# Patient Record
Sex: MALE | Race: WHITE | NOT HISPANIC OR LATINO | Employment: OTHER | URBAN - METROPOLITAN AREA
[De-identification: names, ages, dates, MRNs, and addresses within clinical notes are randomized per-mention and may not be internally consistent; named-entity substitution may affect disease eponyms.]

---

## 2018-05-18 ENCOUNTER — HOSPITAL ENCOUNTER (INPATIENT)
Facility: HOSPITAL | Age: 78
LOS: 8 days | DRG: 234 | End: 2018-05-26
Attending: INTERNAL MEDICINE | Admitting: INTERNAL MEDICINE
Payer: MEDICARE

## 2018-05-18 DIAGNOSIS — R07.9 CHEST PAIN: ICD-10-CM

## 2018-05-18 DIAGNOSIS — I70.90 ARTERIAL VASCULAR DISEASE: Primary | ICD-10-CM

## 2018-05-18 DIAGNOSIS — I21.4 NSTEMI (NON-ST ELEVATED MYOCARDIAL INFARCTION) (CMS/HCC): ICD-10-CM

## 2018-05-18 DIAGNOSIS — I65.23 BILATERAL CAROTID ARTERY STENOSIS: ICD-10-CM

## 2018-05-18 PROBLEM — E78.5 HYPERLIPIDEMIA: Status: ACTIVE | Noted: 2018-05-18

## 2018-05-18 PROBLEM — K46.0 INCARCERATED HERNIA: Status: ACTIVE | Noted: 2018-05-18

## 2018-05-18 PROBLEM — E03.9 HYPOTHYROIDISM: Status: ACTIVE | Noted: 2018-05-18

## 2018-05-18 PROBLEM — E10.9 TYPE 1 DIABETES MELLITUS (CMS/HCC): Status: ACTIVE | Noted: 2018-05-18

## 2018-05-18 PROBLEM — N20.0 NEPHROLITHIASIS: Status: RESOLVED | Noted: 2018-05-18 | Resolved: 2018-05-18

## 2018-05-18 PROBLEM — N20.0 NEPHROLITHIASIS: Status: ACTIVE | Noted: 2018-05-18

## 2018-05-18 LAB
ALBUMIN SERPL-MCNC: 2.8 G/DL (ref 3.4–5)
ALBUMIN SERPL-MCNC: 2.9 G/DL (ref 3.4–5)
ALP SERPL-CCNC: 62 IU/L (ref 35–126)
ALP SERPL-CCNC: 64 IU/L (ref 35–126)
ALT SERPL-CCNC: 53 IU/L (ref 16–63)
ALT SERPL-CCNC: 53 IU/L (ref 16–63)
ANION GAP SERPL CALC-SCNC: 10 MEQ/L (ref 3–15)
ANION GAP SERPL CALC-SCNC: 10 MEQ/L (ref 3–15)
APTT PPP: 35 SEC. (ref 23–35)
APTT PPP: 38 SEC. (ref 23–35)
AST SERPL-CCNC: 155 IU/L (ref 15–41)
AST SERPL-CCNC: 156 IU/L (ref 15–41)
BASOPHILS # BLD: 0.02 K/UL (ref 0.01–0.1)
BASOPHILS NFR BLD: 0.2 %
BILIRUB SERPL-MCNC: 0.8 MG/DL (ref 0.3–1.2)
BILIRUB SERPL-MCNC: 0.8 MG/DL (ref 0.3–1.2)
BUN SERPL-MCNC: 13 MG/DL (ref 8–20)
BUN SERPL-MCNC: 14 MG/DL (ref 8–20)
CALCIUM SERPL-MCNC: 8.9 MG/DL (ref 8.9–10.3)
CALCIUM SERPL-MCNC: 9 MG/DL (ref 8.9–10.3)
CHLORIDE SERPL-SCNC: 102 MMOL/L (ref 98–109)
CHLORIDE SERPL-SCNC: 103 MMOL/L (ref 98–109)
CO2 SERPL-SCNC: 23 MMOL/L (ref 22–32)
CO2 SERPL-SCNC: 24 MMOL/L (ref 22–32)
CREAT SERPL-MCNC: 1.1 MG/DL (ref 0.8–1.3)
CREAT SERPL-MCNC: 1.2 MG/DL (ref 0.8–1.3)
DIFFERENTIAL METHOD BLD: ABNORMAL
EOSINOPHIL # BLD: 0.03 K/UL (ref 0.04–0.54)
EOSINOPHIL NFR BLD: 0.3 %
ERYTHROCYTE [DISTWIDTH] IN BLOOD BY AUTOMATED COUNT: 16.5 % (ref 11.6–14.4)
ERYTHROCYTE [DISTWIDTH] IN BLOOD BY AUTOMATED COUNT: 16.6 % (ref 11.6–14.4)
GFR SERPL CREATININE-BSD FRML MDRD: 58.4 ML/MIN/1.73M*2
GFR SERPL CREATININE-BSD FRML MDRD: >60 ML/MIN/1.73M*2
GLUCOSE BLD-MCNC: 151 MG/DL (ref 70–99)
GLUCOSE BLOOD, POC: 151
GLUCOSE SERPL-MCNC: 156 MG/DL (ref 70–99)
GLUCOSE SERPL-MCNC: 164 MG/DL (ref 70–99)
HCT VFR BLDCO AUTO: 32.2 % (ref 40–51)
HCT VFR BLDCO AUTO: 34.3 % (ref 40–51)
HGB BLD-MCNC: 10.1 G/DL (ref 13.7–17.5)
HGB BLD-MCNC: 10.6 G/DL (ref 13.7–17.5)
IMM GRANULOCYTES # BLD AUTO: 0.03 K/UL (ref 0–0.08)
IMM GRANULOCYTES NFR BLD AUTO: 0.3 %
INR PPP: 1 INR
INR PPP: 1.1 INR
LACTATE BLDA-SCNC: 1.6 MMOL/L (ref 0.4–1.6)
LACTATE SERPL-SCNC: 1.6 MMOL/L (ref 0.4–2)
LYMPHOCYTES # BLD: 1.17 K/UL (ref 1.2–3.5)
LYMPHOCYTES NFR BLD: 11.5 %
MAGNESIUM SERPL-MCNC: 1.5 MG/DL (ref 1.8–2.5)
MCH RBC QN AUTO: 24.2 PG (ref 28–33.2)
MCH RBC QN AUTO: 24.5 PG (ref 28–33.2)
MCHC RBC AUTO-ENTMCNC: 30.9 G/DL (ref 32.2–36.5)
MCHC RBC AUTO-ENTMCNC: 31.4 G/DL (ref 32.2–36.5)
MCV RBC AUTO: 78 FL (ref 83–98)
MCV RBC AUTO: 78.3 FL (ref 83–98)
MONOCYTES # BLD: 0.83 K/UL (ref 0.3–1)
MONOCYTES NFR BLD: 8.2 %
NEUTROPHILS # BLD: 8.1 K/UL (ref 1.7–7)
NEUTS SEG NFR BLD: 79.5 %
NRBC BLD-RTO: 0 %
PDW BLD AUTO: 9.4 FL (ref 9.4–12.4)
PDW BLD AUTO: 9.7 FL (ref 9.4–12.4)
PLATELET # BLD AUTO: 349 K/UL (ref 150–350)
PLATELET # BLD AUTO: 356 K/UL (ref 150–350)
POTASSIUM SERPL-SCNC: 3.8 MMOL/L (ref 3.6–5.1)
POTASSIUM SERPL-SCNC: 4.1 MMOL/L (ref 3.6–5.1)
PROT SERPL-MCNC: 5.9 G/DL (ref 6–8.2)
PROT SERPL-MCNC: 6.3 G/DL (ref 6–8.2)
PROTHROMBIN TIME: 13.5 SEC. (ref 12.2–14.5)
PROTHROMBIN TIME: 13.8 SEC. (ref 12.2–14.5)
RBC # BLD AUTO: 4.13 M/UL (ref 4.5–5.8)
RBC # BLD AUTO: 4.38 M/UL (ref 4.5–5.8)
SODIUM SERPL-SCNC: 135 MMOL/L (ref 136–144)
SODIUM SERPL-SCNC: 137 MMOL/L (ref 136–144)
TROPONIN I SERPL-MCNC: 37.63 NG/ML
WBC # BLD AUTO: 10.18 K/UL (ref 3.8–10.5)
WBC # BLD AUTO: 10.36 K/UL (ref 3.8–10.5)

## 2018-05-18 PROCEDURE — 85610 PROTHROMBIN TIME: CPT | Performed by: STUDENT IN AN ORGANIZED HEALTH CARE EDUCATION/TRAINING PROGRAM

## 2018-05-18 PROCEDURE — 83735 ASSAY OF MAGNESIUM: CPT | Performed by: STUDENT IN AN ORGANIZED HEALTH CARE EDUCATION/TRAINING PROGRAM

## 2018-05-18 PROCEDURE — 83605 ASSAY OF LACTIC ACID: CPT | Performed by: INTERNAL MEDICINE

## 2018-05-18 PROCEDURE — 93005 ELECTROCARDIOGRAM TRACING: CPT | Performed by: STUDENT IN AN ORGANIZED HEALTH CARE EDUCATION/TRAINING PROGRAM

## 2018-05-18 PROCEDURE — 85027 COMPLETE CBC AUTOMATED: CPT | Performed by: STUDENT IN AN ORGANIZED HEALTH CARE EDUCATION/TRAINING PROGRAM

## 2018-05-18 PROCEDURE — 63600000 HC DRUGS/DETAIL CODE: Performed by: STUDENT IN AN ORGANIZED HEALTH CARE EDUCATION/TRAINING PROGRAM

## 2018-05-18 PROCEDURE — 86922 COMPATIBILITY TEST ANTIGLOB: CPT | Mod: 59

## 2018-05-18 PROCEDURE — 93454 CORONARY ARTERY ANGIO S&I: CPT | Mod: 26 | Performed by: INTERNAL MEDICINE

## 2018-05-18 PROCEDURE — 99223 1ST HOSP IP/OBS HIGH 75: CPT | Mod: 25 | Performed by: INTERNAL MEDICINE

## 2018-05-18 PROCEDURE — B211YZZ FLUOROSCOPY OF MULTIPLE CORONARY ARTERIES USING OTHER CONTRAST: ICD-10-PCS | Performed by: INTERNAL MEDICINE

## 2018-05-18 PROCEDURE — 86850 RBC ANTIBODY SCREEN: CPT

## 2018-05-18 PROCEDURE — 85610 PROTHROMBIN TIME: CPT | Performed by: INTERNAL MEDICINE

## 2018-05-18 PROCEDURE — 63600000 HC DRUGS/DETAIL CODE: Performed by: INTERNAL MEDICINE

## 2018-05-18 PROCEDURE — 86901 BLOOD TYPING SEROLOGIC RH(D): CPT

## 2018-05-18 PROCEDURE — 27200000 HC STERILE SUPPLY: Performed by: INTERNAL MEDICINE

## 2018-05-18 PROCEDURE — 84484 ASSAY OF TROPONIN QUANT: CPT | Performed by: STUDENT IN AN ORGANIZED HEALTH CARE EDUCATION/TRAINING PROGRAM

## 2018-05-18 PROCEDURE — 83605 ASSAY OF LACTIC ACID: CPT | Performed by: STUDENT IN AN ORGANIZED HEALTH CARE EDUCATION/TRAINING PROGRAM

## 2018-05-18 PROCEDURE — 85025 COMPLETE CBC W/AUTO DIFF WBC: CPT | Performed by: INTERNAL MEDICINE

## 2018-05-18 PROCEDURE — 4A023N7 MEASUREMENT OF CARDIAC SAMPLING AND PRESSURE, LEFT HEART, PERCUTANEOUS APPROACH: ICD-10-PCS | Performed by: INTERNAL MEDICINE

## 2018-05-18 PROCEDURE — 99153 MOD SED SAME PHYS/QHP EA: CPT | Performed by: INTERNAL MEDICINE

## 2018-05-18 PROCEDURE — 99222 1ST HOSP IP/OBS MODERATE 55: CPT | Performed by: THORACIC SURGERY (CARDIOTHORACIC VASCULAR SURGERY)

## 2018-05-18 PROCEDURE — 80053 COMPREHEN METABOLIC PANEL: CPT | Performed by: STUDENT IN AN ORGANIZED HEALTH CARE EDUCATION/TRAINING PROGRAM

## 2018-05-18 PROCEDURE — 99152 MOD SED SAME PHYS/QHP 5/>YRS: CPT | Performed by: INTERNAL MEDICINE

## 2018-05-18 PROCEDURE — 85730 THROMBOPLASTIN TIME PARTIAL: CPT | Performed by: INTERNAL MEDICINE

## 2018-05-18 PROCEDURE — C1769 GUIDE WIRE: HCPCS | Performed by: INTERNAL MEDICINE

## 2018-05-18 PROCEDURE — 63600105 HC IODINE BASED CONTRAST: Performed by: INTERNAL MEDICINE

## 2018-05-18 PROCEDURE — 99222 1ST HOSP IP/OBS MODERATE 55: CPT | Performed by: SURGERY

## 2018-05-18 PROCEDURE — 36415 COLL VENOUS BLD VENIPUNCTURE: CPT | Performed by: INTERNAL MEDICINE

## 2018-05-18 PROCEDURE — 63700000 HC SELF-ADMINISTRABLE DRUG: Performed by: INTERNAL MEDICINE

## 2018-05-18 PROCEDURE — C1894 INTRO/SHEATH, NON-LASER: HCPCS | Performed by: INTERNAL MEDICINE

## 2018-05-18 PROCEDURE — 93454 CORONARY ARTERY ANGIO S&I: CPT | Performed by: INTERNAL MEDICINE

## 2018-05-18 PROCEDURE — 25000000 HC PHARMACY GENERAL: Performed by: INTERNAL MEDICINE

## 2018-05-18 PROCEDURE — 80053 COMPREHEN METABOLIC PANEL: CPT | Performed by: INTERNAL MEDICINE

## 2018-05-18 PROCEDURE — 20000000 HC ROOM AND CARE ICU

## 2018-05-18 PROCEDURE — 85730 THROMBOPLASTIN TIME PARTIAL: CPT | Performed by: STUDENT IN AN ORGANIZED HEALTH CARE EDUCATION/TRAINING PROGRAM

## 2018-05-18 RX ORDER — IODIXANOL 320 MG/ML
INJECTION, SOLUTION INTRAVASCULAR AS NEEDED
Status: DISCONTINUED | OUTPATIENT
Start: 2018-05-18 | End: 2018-05-18 | Stop reason: HOSPADM

## 2018-05-18 RX ORDER — DEXTROSE 40 %
15-30 GEL (GRAM) ORAL AS NEEDED
Status: DISCONTINUED | OUTPATIENT
Start: 2018-05-18 | End: 2018-05-20

## 2018-05-18 RX ORDER — NICARDIPINE HCL-0.9% SOD CHLOR 1 MG/10 ML
SYRINGE (ML) INTRAVENOUS AS NEEDED
Status: DISCONTINUED | OUTPATIENT
Start: 2018-05-18 | End: 2018-05-18 | Stop reason: HOSPADM

## 2018-05-18 RX ORDER — LEVOTHYROXINE SODIUM 200 UG/1
200 TABLET ORAL
Status: DISCONTINUED | OUTPATIENT
Start: 2018-05-19 | End: 2018-05-21

## 2018-05-18 RX ORDER — MAGNESIUM SULFATE HEPTAHYDRATE 40 MG/ML
4 INJECTION, SOLUTION INTRAVENOUS ONCE
Status: COMPLETED | OUTPATIENT
Start: 2018-05-19 | End: 2018-05-19

## 2018-05-18 RX ORDER — FENTANYL CITRATE 50 UG/ML
INJECTION, SOLUTION INTRAMUSCULAR; INTRAVENOUS
Status: DISCONTINUED
Start: 2018-05-18 | End: 2018-05-18 | Stop reason: WASHOUT

## 2018-05-18 RX ORDER — IBUPROFEN 200 MG
16-32 TABLET ORAL AS NEEDED
Status: DISCONTINUED | OUTPATIENT
Start: 2018-05-18 | End: 2018-05-20

## 2018-05-18 RX ORDER — HEPARIN SODIUM 10000 [USP'U]/100ML
0-1900 INJECTION, SOLUTION INTRAVENOUS
Status: DISCONTINUED | OUTPATIENT
Start: 2018-05-18 | End: 2018-05-20

## 2018-05-18 RX ORDER — HEPARIN SODIUM 1000 [USP'U]/ML
INJECTION, SOLUTION INTRAVENOUS; SUBCUTANEOUS
Status: DISCONTINUED
Start: 2018-05-18 | End: 2018-05-26 | Stop reason: HOSPADM

## 2018-05-18 RX ORDER — LIDOCAINE HYDROCHLORIDE 10 MG/ML
INJECTION, SOLUTION EPIDURAL; INFILTRATION; INTRACAUDAL; PERINEURAL
Status: DISCONTINUED
Start: 2018-05-18 | End: 2018-05-26 | Stop reason: HOSPADM

## 2018-05-18 RX ORDER — ASPIRIN 325 MG
325 TABLET ORAL ONCE
Status: COMPLETED | OUTPATIENT
Start: 2018-05-18 | End: 2018-05-18

## 2018-05-18 RX ORDER — HEPARIN SODIUM 1000 [USP'U]/ML
INJECTION, SOLUTION INTRAVENOUS; SUBCUTANEOUS AS NEEDED
Status: DISCONTINUED | OUTPATIENT
Start: 2018-05-18 | End: 2018-05-18 | Stop reason: HOSPADM

## 2018-05-18 RX ORDER — DEXTROSE 50 % IN WATER (D50W) INTRAVENOUS SYRINGE
25 AS NEEDED
Status: DISCONTINUED | OUTPATIENT
Start: 2018-05-18 | End: 2018-05-20

## 2018-05-18 RX ORDER — ASPIRIN 81 MG/1
81 TABLET ORAL DAILY
Status: DISCONTINUED | OUTPATIENT
Start: 2018-05-19 | End: 2018-05-26 | Stop reason: HOSPADM

## 2018-05-18 RX ORDER — PANTOPRAZOLE SODIUM 40 MG/1
40 TABLET, DELAYED RELEASE ORAL NIGHTLY
Status: COMPLETED | OUTPATIENT
Start: 2018-05-18 | End: 2018-05-19

## 2018-05-18 RX ORDER — ASPIRIN 325 MG
TABLET ORAL
Status: DISCONTINUED
Start: 2018-05-18 | End: 2018-05-26 | Stop reason: HOSPADM

## 2018-05-18 RX ORDER — CHLORHEXIDINE GLUCONATE ORAL RINSE 1.2 MG/ML
15 SOLUTION DENTAL ONCE
Status: CANCELLED | OUTPATIENT
Start: 2018-05-18 | End: 2018-05-18

## 2018-05-18 RX ORDER — MIDAZOLAM HYDROCHLORIDE 2 MG/2ML
INJECTION, SOLUTION INTRAMUSCULAR; INTRAVENOUS
Status: DISCONTINUED
Start: 2018-05-18 | End: 2018-05-18 | Stop reason: WASHOUT

## 2018-05-18 RX ORDER — NITROGLYCERIN 40 MG/100ML
INJECTION INTRAVENOUS CONTINUOUS PRN
Status: DISCONTINUED | OUTPATIENT
Start: 2018-05-18 | End: 2018-05-18 | Stop reason: HOSPADM

## 2018-05-18 RX ADMIN — HEPARIN SODIUM 1000 UNITS/HR: 10000 INJECTION, SOLUTION INTRAVENOUS at 20:24

## 2018-05-18 RX ADMIN — ASPIRIN 325 MG: 325 TABLET ORAL at 17:39

## 2018-05-18 NOTE — Clinical Note
The left groin and left radial was clipped, marked  and prepped with ChloraPrep. The patient was draped in a sterile fashion after allowing for the recommended dry time.

## 2018-05-18 NOTE — Clinical Note
Closure device used: radial compression system. Closure pressure manually applied. Hemostasis achieved.

## 2018-05-18 NOTE — PRE-PROCEDURE NOTE
Cardiac Cath Lab Pre-procedure Note    - Patient was seen and examined at bedside.  - The patient's chart and all data was reviewed.  - The procedure, treatment alternatives, risks and benefits were explained with specific risks discussed.  - Patient was consented for cardiac cath procedure and possible PCI.  - Patient's case was found appropriate for dual antiplatelet therapy.    Patient's clinical presentation to the cardiac cath lab: NSTEMI.     Patient is at risk for bleeding and renal failure potentially requiring dialysis due to the presence of pre-procedural abnormal renal function.   Patient appears to be moderately frail.         Total anti-anginal medications: 1.

## 2018-05-18 NOTE — Clinical Note
Universal procedure checklist completed. Airway assessment completed. Physician agrees with Mallampati.

## 2018-05-18 NOTE — CONSULTS
"General Surgery Consult    Subjective     Maximus Daniel is a 77 y.o. male who was admitted for Chest pain [R07.9]. Patient was seen in consultation at the request of referring physician for management recommendations. Patient is ***    Medical History: No past medical history on file.    Surgical History: No past surgical history on file.    Social History:   Social History     Social History Narrative   • No narrative on file       Family History: No family history on file.    Allergies: Patient has no known allergies.    Home Medications:      Current Medications:  •  aspirin, , ,   •  [START ON 5/19/2018] aspirin, 81 mg, oral, Daily  •  glucose, 16-32 g of dextrose, oral, PRN **OR** dextrose, 15-30 g of dextrose, oral, PRN **OR** glucagon, 1 mg, intramuscular, PRN **OR** dextrose in water, 25 mL, intravenous, PRN  •  heparin (porcine), , ,   •  heparin (porcine), 15-30 Units/kg (Adjusted), intravenous, q6h PRN  •  heparin (porcine), 4,000 Units, intravenous, Once  •  heparin infusion - MAR calculator by PTT, 0-1,900 Units/hr, intravenous, Titrated  •  lidocaine PF, , ,     Review of Systems  {Ros - Complete:37714}    Objective     Physicial Exam  BP (!) 106/59   Pulse 72   Ht 1.6 m (5' 3\")   Wt 89.8 kg (198 lb)   SpO2 97%   BMI 35.07 kg/m²     General appearance: {general exam:09118::\"alert\",\"appears stated age\",\"cooperative\"}  Head: {head exam:72735::\"normocephalic, without obvious abnormality\",\"atraumatic\"}  Eyes: {eye exam:201::\"conjunctivae/corneas clear. PERRL, EOM's intact. Fundi benign.\"}  Ears: {ear exam:4489::\"normal TM's and external ear canals both ears\"}  Nose: {nose exam:30698::\"Nares normal. Septum midline. Mucosa normal. No drainage or sinus tenderness.\"}  Throat: {throat exam:17968::\"lips, mucosa, and tongue normal; teeth and gums normal\"}  Neck: {neck exam:47066::\"no adenopathy\",\"no carotid bruit\",\"no JVD\",\"supple, symmetrical, trachea midline\",\"thyroid not enlarged, symmetric, no " "tenderness/mass/nodules\"}  Back: {back exam:801::\"symmetric, no curvature. ROM normal. No CVA tenderness.\"}  Lungs: {lung exam:61095::\"clear to auscultation bilaterally\"}  Chest wall: {chest exam:48861::\"no tenderness\"}  Heart: {heart exam:71336::\"regular rate and rhythm, S1, S2 normal, no murmur, click, rub or gallop\"}  Abdomen: {abdominal exam:31442::\"soft, non-tender; bowel sounds normal; no masses, no organomegaly\"}  Genitalia: {Genitalia:99469}  Rectal: {rectal exam:83030::\"normal tone, no masses or tenderness\"}  Extremities: {extremity exam:5109::\"extremities normal, warm and well-perfused; no cyanosis, clubbing, or edema\"}  Pulses: {pulse exam:50832::\"2+ and symmetric\"}  Skin: {skin exam:67162::\"Skin color, texture, turgor normal. No rashes or lesions\"}  Lymph nodes: {lymph node exam:12687::\"Cervical, supraclavicular, and axillary nodes normal.\"}  Neurologic: {neuro exam:80623::\"Grossly normal\"}  ***    Labs  {LABS REVIEWED SCT:58410}    Imaging  {Imaging reviewed last 24H:52792}    Assessment     ***        Plan     ***    Bell Witt MD  "

## 2018-05-18 NOTE — NURSING NOTE
Patient arrived from [facility] via ambulance. Family at bedside. Dr Rg and Dr Dent at bedside upon arrival. EKG performed. Heparin gtt d/c'd at 1700, NTG gtt continue at 33mcg/min through RFA.

## 2018-05-19 ENCOUNTER — APPOINTMENT (INPATIENT)
Dept: CARDIOLOGY | Facility: HOSPITAL | Age: 78
DRG: 234 | End: 2018-05-19
Attending: INTERNAL MEDICINE
Payer: MEDICARE

## 2018-05-19 ENCOUNTER — APPOINTMENT (INPATIENT)
Dept: RADIOLOGY | Facility: HOSPITAL | Age: 78
DRG: 234 | End: 2018-05-19
Attending: STUDENT IN AN ORGANIZED HEALTH CARE EDUCATION/TRAINING PROGRAM
Payer: MEDICARE

## 2018-05-19 ENCOUNTER — ANESTHESIA EVENT (INPATIENT)
Dept: OPERATING ROOM | Facility: HOSPITAL | Age: 78
DRG: 234 | End: 2018-05-19
Payer: MEDICARE

## 2018-05-19 ENCOUNTER — APPOINTMENT (INPATIENT)
Dept: RADIOLOGY | Facility: HOSPITAL | Age: 78
DRG: 234 | End: 2018-05-19
Attending: NURSE PRACTITIONER
Payer: MEDICARE

## 2018-05-19 PROBLEM — E11.9 TYPE 2 DIABETES MELLITUS (CMS/HCC): Status: ACTIVE | Noted: 2018-05-18

## 2018-05-19 PROBLEM — I21.3 STEMI (ST ELEVATION MYOCARDIAL INFARCTION) (CMS/HCC): Status: ACTIVE | Noted: 2018-05-19

## 2018-05-19 PROBLEM — I21.4 NSTEMI (NON-ST ELEVATED MYOCARDIAL INFARCTION) (CMS/HCC): Status: ACTIVE | Noted: 2018-05-18

## 2018-05-19 PROBLEM — R07.9 CHEST PAIN: Status: ACTIVE | Noted: 2018-05-18

## 2018-05-19 PROBLEM — R07.9 CHEST PAIN: Status: RESOLVED | Noted: 2018-05-18 | Resolved: 2018-05-19

## 2018-05-19 LAB
ABO + RH BLD: NORMAL
ABO + RH BLD: NORMAL
ANION GAP SERPL CALC-SCNC: 7 MEQ/L (ref 3–15)
AORTIC ROOT ANNULUS: 3.5 CM
AORTIC VALVE MEAN VELOCITY: 1.47 M/S
AORTIC VALVE VELOCITY TIME INTEGRAL: 40.6 CM
APTT PPP: 44 SEC. (ref 23–35)
APTT PPP: 47 SEC. (ref 23–35)
APTT PPP: 52 SEC. (ref 23–35)
ASCENDING AORTA: 3.8 CM
AV MEAN GRADIENT: 10 MMHG
AV PEAK GRADIENT: 20 MMHG
AV PEAK VELOCITY-S: 2.23 M/S
AV VALVE AREA: 2.23 CM2
BASE EXCESS BLDA CALC-SCNC: -1.9 MMOL/L
BILIRUB UR QL STRIP.AUTO: NEGATIVE MG/DL
BLD GP AB INVEST PLASRBC-IMP: NORMAL
BLD GP AB SCN SERPL QL: POSITIVE
BSA FOR ECHO PROCEDURE: 2 M2
BUN SERPL-MCNC: 15 MG/DL (ref 8–20)
CALCIUM SERPL-MCNC: 8.4 MG/DL (ref 8.9–10.3)
CHLORIDE SERPL-SCNC: 105 MMOL/L (ref 98–109)
CHOLEST SERPL-MCNC: 152 MG/DL
CLARITY UR REFRACT.AUTO: CLEAR
CO2 SERPL-SCNC: 25 MMOL/L (ref 22–32)
COLOR UR AUTO: YELLOW
CREAT SERPL-MCNC: 1.1 MG/DL (ref 0.8–1.3)
CROSSMATCH: NORMAL
CROSSMATCH: NORMAL
CUSP SEPARATION: 1.2 CM
D AG BLD QL: NEGATIVE
D AG BLD QL: NEGATIVE
DOP CALC LVOT STROKE VOLUME: 109.87 ML
E WAVE DECELERATION TIME: 190 MS
E/A RATIO: 1.1
E/E' RATIO: 17.2
E/LAT E' RATIO: 13.9
EDV (BP): 95.4 ML
EF (A4C): 69.5 %
EF A2C: 73.5 %
EJECTION FRACTION: 71.7 %
ERYTHROCYTE [DISTWIDTH] IN BLOOD BY AUTOMATED COUNT: 16.4 % (ref 11.6–14.4)
EST. AVERAGE GLUCOSE BLD GHB EST-MCNC: 255 MG/DL
ESV (BP): 27 CM3
FIO2 ON VENT: 21 %
GFR SERPL CREATININE-BSD FRML MDRD: >60 ML/MIN/1.73M*2
GLUCOSE BLD-MCNC: 141 MG/DL (ref 70–99)
GLUCOSE BLD-MCNC: 160 MG/DL (ref 70–99)
GLUCOSE BLD-MCNC: 174 MG/DL (ref 70–99)
GLUCOSE BLD-MCNC: 263 MG/DL (ref 70–99)
GLUCOSE SERPL-MCNC: 145 MG/DL (ref 70–99)
GLUCOSE UR STRIP.AUTO-MCNC: >=1000 MG/DL
HBA1C MFR BLD HPLC: 10.5 %
HCO3 BLDA-SCNC: 21.7 MMOL/L (ref 21–28)
HCT VFR BLDCO AUTO: 29.4 % (ref 40–51)
HDLC SERPL-MCNC: 29 MG/DL
HDLC SERPL: 5.2 {RATIO}
HGB BLD-MCNC: 9 G/DL (ref 13.7–17.5)
HGB UR QL STRIP.AUTO: NEGATIVE
INHALED O2 CONCENTRATION: ABNORMAL %
INR PPP: 1.1 INR
INTERVENTRICULAR SEPTUM: 0.93 CM
ISBT CODE: 600
ISBT CODE: 600
KETONES UR STRIP.AUTO-MCNC: ABNORMAL MG/DL
LA ESV (BP): 66.9 CM3
LA ESV INDEX (A2C): 32.15 CM3/M2
LA ESV INDEX (BP): 33.45 CM3/M2
LA/AORTA RATIO: 1.37
LAAS-AP2: 21.5 CM2
LAAS-AP4: 19.7 CM2
LABORATORY COMMENT REPORT: NORMAL
LABORATORY COMMENT REPORT: NORMAL
LAD 2D: 4.8 CM
LALD A4C: 5.17 CM
LALD A4C: 5.81 CM
LAV-S: 64.3 CM3
LDLC SERPL CALC-MCNC: 103 MG/DL
LEFT ATRIUM VOLUME INDEX: 33.5 CM3/M2
LEFT ATRIUM VOLUME: 67 CM3
LEFT INTERNAL DIMENSION IN SYSTOLE: 2.7 CM (ref 2.72–4.12)
LEFT VENTRICLE DIASTOLIC VOLUME INDEX: 50.5 ML/M2
LEFT VENTRICLE DIASTOLIC VOLUME: 101 ML
LEFT VENTRICLE SYSTOLIC VOLUME INDEX: 15.35 CM3/M2
LEFT VENTRICLE SYSTOLIC VOLUME: 30.7 CM3
LEFT VENTRICULAR INTERNAL DIMENSION IN DIASTOLE: 4.11 CM (ref 4.62–6.42)
LEFT VENTRICULAR POSTERIOR WALL IN END DIASTOLE: 1 CM (ref 0.6–1.12)
LEUKOCYTE ESTERASE UR QL STRIP.AUTO: NEGATIVE
LV DIASTOLIC VOLUME: 88.3 ML
LV ESV (APICAL 2 CHAMBER): 23.4 CM3
LVAD-AP2: 29.1 CM2
LVAD-AP4: 31 CM2
LVAS-AP2: 12.8 CM2
LVEDVI(A2C): 44.15 ML/M2
LVEDVI(BP): 47.7 ML/M2
LVESVI(A2C): 11.7 CM3/M2
LVESVI(BP): 13.5 CM3/M2
LVLD-AP2: 7.99 CM
LVLD-AP4: 7.81 CM
LVLS-AP2: 6.17 CM
LVLS-AP4: 6.31 CM
LVOT 2D: 2.4 CM
LVOT A: 4.52 CM2
LVOT MG: 3 MMHG
LVOT MV: 0.81 M/S
LVOT PEAK VELOCITY: 1.1 M/S
LVOT PG: 5 MMHG
LVOT VTI: 24.3 CM
MAGNESIUM SERPL-MCNC: 2.3 MG/DL (ref 1.8–2.5)
MAGNESIUM SERPL-MCNC: 2.8 MG/DL (ref 1.8–2.5)
MCH RBC QN AUTO: 24.6 PG (ref 28–33.2)
MCHC RBC AUTO-ENTMCNC: 30.6 G/DL (ref 32.2–36.5)
MCV RBC AUTO: 80.3 FL (ref 83–98)
MRSA DNA SPEC QL NAA+PROBE: NEGATIVE
MV E'TISSUE VEL-LAT: 0.08 M/S
MV E'TISSUE VEL-MED: 0.07 M/S
MV MEAN GRADIENT: 3 MMHG
MV PEAK A VEL: 1.08 M/S
MV PEAK E VEL: 1.18 M/S
MV PEAK GRADIENT: 8 MMHG
MV VALVE AREA BY CONTINUITY EQUATION: 2.17 CM2
MV VTI: 50.7 CM
NITRITE UR QL STRIP.AUTO: NEGATIVE
NONHDLC SERPL-MCNC: 123 MG/DL
PCO2 BLDA: 32 MMHG (ref 35–48)
PDW BLD AUTO: 9.3 FL (ref 9.4–12.4)
PH BLDA: 7.44 [PH] (ref 7.35–7.45)
PH UR STRIP.AUTO: 5.5 [PH]
PLATELET # BLD AUTO: 329 K/UL (ref 150–350)
PO2 BLDA: 80 MMHG (ref 83–100)
POSTERIOR WALL: 1 CM
POTASSIUM SERPL-SCNC: 3.7 MMOL/L (ref 3.6–5.1)
PREALB SERPL-MCNC: 14.2 MG/DL (ref 18–38)
PRODUCT CODE: NORMAL
PRODUCT CODE: NORMAL
PRODUCT STATUS: NORMAL
PRODUCT STATUS: NORMAL
PROT UR QL STRIP.AUTO: NEGATIVE
PROTHROMBIN TIME: 14.3 SEC. (ref 12.2–14.5)
RBC # BLD AUTO: 3.66 M/UL (ref 4.5–5.8)
RVOT VMAX: 0.83 M/S
SODIUM SERPL-SCNC: 137 MMOL/L (ref 136–144)
SP GR UR REFRACT.AUTO: >1.035
SPECIMEN EXP DATE BLD: NORMAL
SPECIMEN EXP DATE BLD: NORMAL
T4 FREE SERPL-MCNC: 1.54 NG/DL (ref 0.58–1.64)
TRIGL SERPL-MCNC: 101 MG/DL (ref 30–149)
TROPONIN I SERPL-MCNC: 19.38 NG/ML
TROPONIN I SERPL-MCNC: 30.19 NG/ML
TROPONIN I SERPL-MCNC: 43.34 NG/ML
UNIT ABO: NORMAL
UNIT ABO: NORMAL
UNIT ID: NORMAL
UNIT ID: NORMAL
UNIT RH: NEGATIVE
UNIT RH: NEGATIVE
UROBILINOGEN UR STRIP-ACNC: 0.2 EU/DL
WBC # BLD AUTO: 9.19 K/UL (ref 3.8–10.5)
Z-SCORE OF LEFT VENTRICULAR DIMENSION IN END DIASTOLE: -2.81
Z-SCORE OF LEFT VENTRICULAR DIMENSION IN END SYSTOLE: -1.71
Z-SCORE OF LEFT VENTRICULAR POSTERIOR WALL IN END DIASTOLE: 1.02

## 2018-05-19 PROCEDURE — 63700000 HC SELF-ADMINISTRABLE DRUG: Performed by: PHYSICIAN ASSISTANT

## 2018-05-19 PROCEDURE — 99223 1ST HOSP IP/OBS HIGH 75: CPT | Mod: 25 | Performed by: INTERNAL MEDICINE

## 2018-05-19 PROCEDURE — 63700000 HC SELF-ADMINISTRABLE DRUG: Performed by: STUDENT IN AN ORGANIZED HEALTH CARE EDUCATION/TRAINING PROGRAM

## 2018-05-19 PROCEDURE — 63700000 HC SELF-ADMINISTRABLE DRUG: Performed by: NURSE PRACTITIONER

## 2018-05-19 PROCEDURE — 20000000 HC ROOM AND CARE ICU

## 2018-05-19 PROCEDURE — 93306 TTE W/DOPPLER COMPLETE: CPT

## 2018-05-19 PROCEDURE — 85610 PROTHROMBIN TIME: CPT | Performed by: PHYSICIAN ASSISTANT

## 2018-05-19 PROCEDURE — 81003 URINALYSIS AUTO W/O SCOPE: CPT | Performed by: PHYSICIAN ASSISTANT

## 2018-05-19 PROCEDURE — 71250 CT THORAX DX C-: CPT

## 2018-05-19 PROCEDURE — 85730 THROMBOPLASTIN TIME PARTIAL: CPT | Performed by: INTERNAL MEDICINE

## 2018-05-19 PROCEDURE — 83036 HEMOGLOBIN GLYCOSYLATED A1C: CPT | Performed by: PHYSICIAN ASSISTANT

## 2018-05-19 PROCEDURE — 93880 EXTRACRANIAL BILAT STUDY: CPT

## 2018-05-19 PROCEDURE — 80061 LIPID PANEL: CPT | Performed by: PHYSICIAN ASSISTANT

## 2018-05-19 PROCEDURE — 82803 BLOOD GASES ANY COMBINATION: CPT | Performed by: NURSE PRACTITIONER

## 2018-05-19 PROCEDURE — 84484 ASSAY OF TROPONIN QUANT: CPT | Mod: 91 | Performed by: STUDENT IN AN ORGANIZED HEALTH CARE EDUCATION/TRAINING PROGRAM

## 2018-05-19 PROCEDURE — 93306 TTE W/DOPPLER COMPLETE: CPT | Mod: 26 | Performed by: INTERNAL MEDICINE

## 2018-05-19 PROCEDURE — 99232 SBSQ HOSP IP/OBS MODERATE 35: CPT | Performed by: SURGERY

## 2018-05-19 PROCEDURE — 36415 COLL VENOUS BLD VENIPUNCTURE: CPT | Performed by: PHYSICIAN ASSISTANT

## 2018-05-19 PROCEDURE — 63600000 HC DRUGS/DETAIL CODE: Performed by: INTERNAL MEDICINE

## 2018-05-19 PROCEDURE — 84484 ASSAY OF TROPONIN QUANT: CPT | Performed by: PHYSICIAN ASSISTANT

## 2018-05-19 PROCEDURE — 85730 THROMBOPLASTIN TIME PARTIAL: CPT | Performed by: STUDENT IN AN ORGANIZED HEALTH CARE EDUCATION/TRAINING PROGRAM

## 2018-05-19 PROCEDURE — 84484 ASSAY OF TROPONIN QUANT: CPT | Mod: 91 | Performed by: INTERNAL MEDICINE

## 2018-05-19 PROCEDURE — 83735 ASSAY OF MAGNESIUM: CPT | Performed by: INTERNAL MEDICINE

## 2018-05-19 PROCEDURE — 71045 X-RAY EXAM CHEST 1 VIEW: CPT

## 2018-05-19 PROCEDURE — 85027 COMPLETE CBC AUTOMATED: CPT | Performed by: PHYSICIAN ASSISTANT

## 2018-05-19 PROCEDURE — 85730 THROMBOPLASTIN TIME PARTIAL: CPT | Performed by: NURSE PRACTITIONER

## 2018-05-19 PROCEDURE — 80048 BASIC METABOLIC PNL TOTAL CA: CPT | Performed by: PHYSICIAN ASSISTANT

## 2018-05-19 PROCEDURE — 84134 ASSAY OF PREALBUMIN: CPT | Performed by: PHYSICIAN ASSISTANT

## 2018-05-19 PROCEDURE — 63600000 HC DRUGS/DETAIL CODE: Performed by: STUDENT IN AN ORGANIZED HEALTH CARE EDUCATION/TRAINING PROGRAM

## 2018-05-19 PROCEDURE — 84439 ASSAY OF FREE THYROXINE: CPT | Performed by: PHYSICIAN ASSISTANT

## 2018-05-19 PROCEDURE — 25800000 HC PHARMACY IV SOLUTIONS: Performed by: INTERNAL MEDICINE

## 2018-05-19 PROCEDURE — 85730 THROMBOPLASTIN TIME PARTIAL: CPT | Performed by: PHYSICIAN ASSISTANT

## 2018-05-19 PROCEDURE — 63700000 HC SELF-ADMINISTRABLE DRUG: Performed by: INTERNAL MEDICINE

## 2018-05-19 PROCEDURE — 87641 MR-STAPH DNA AMP PROBE: CPT | Performed by: INTERNAL MEDICINE

## 2018-05-19 RX ORDER — CEFAZOLIN SODIUM/WATER 1 G/10 ML
2 SYRINGE (ML) INTRAVENOUS
Status: DISCONTINUED | OUTPATIENT
Start: 2018-05-19 | End: 2018-05-19

## 2018-05-19 RX ORDER — INSULIN ASPART 100 [IU]/ML
3-5 INJECTION, SOLUTION INTRAVENOUS; SUBCUTANEOUS
Status: DISCONTINUED | OUTPATIENT
Start: 2018-05-19 | End: 2018-05-20

## 2018-05-19 RX ORDER — CEFAZOLIN SODIUM/WATER 1 G/10 ML
2 SYRINGE (ML) INTRAVENOUS ONCE
Status: COMPLETED | OUTPATIENT
Start: 2018-05-20 | End: 2018-05-20

## 2018-05-19 RX ORDER — VANCOMYCIN HYDROCHLORIDE
1.25 ONCE
Status: DISCONTINUED | OUTPATIENT
Start: 2018-05-20 | End: 2018-05-20

## 2018-05-19 RX ORDER — PANTOPRAZOLE SODIUM 40 MG/1
40 TABLET, DELAYED RELEASE ORAL NIGHTLY
Status: COMPLETED | OUTPATIENT
Start: 2018-05-19 | End: 2018-05-19

## 2018-05-19 RX ORDER — MUPIROCIN 20 MG/G
1 OINTMENT TOPICAL 2 TIMES DAILY
Status: DISCONTINUED | OUTPATIENT
Start: 2018-05-19 | End: 2018-05-20

## 2018-05-19 RX ORDER — CHLORHEXIDINE GLUCONATE ORAL RINSE 1.2 MG/ML
15 SOLUTION DENTAL ONCE
Status: DISCONTINUED | OUTPATIENT
Start: 2018-05-19 | End: 2018-05-20

## 2018-05-19 RX ORDER — INSULIN GLARGINE 100 [IU]/ML
10 INJECTION, SOLUTION SUBCUTANEOUS NIGHTLY
Status: DISCONTINUED | OUTPATIENT
Start: 2018-05-19 | End: 2018-05-20

## 2018-05-19 RX ORDER — CEFAZOLIN SODIUM/WATER 1 G/10 ML
2 SYRINGE (ML) INTRAVENOUS ONCE
Status: DISCONTINUED | OUTPATIENT
Start: 2018-05-19 | End: 2018-05-19

## 2018-05-19 RX ORDER — ROSUVASTATIN CALCIUM 10 MG/1
10 TABLET, COATED ORAL NIGHTLY
Status: DISCONTINUED | OUTPATIENT
Start: 2018-05-20 | End: 2018-05-19

## 2018-05-19 RX ORDER — ROSUVASTATIN CALCIUM 10 MG/1
10 TABLET, COATED ORAL NIGHTLY
Status: DISCONTINUED | OUTPATIENT
Start: 2018-05-19 | End: 2018-05-19

## 2018-05-19 RX ORDER — CHLORHEXIDINE GLUCONATE ORAL RINSE 1.2 MG/ML
15 SOLUTION DENTAL ONCE
Status: DISCONTINUED | OUTPATIENT
Start: 2018-05-19 | End: 2018-05-19

## 2018-05-19 RX ADMIN — HEPARIN SODIUM 1300 UNITS/HR: 10000 INJECTION, SOLUTION INTRAVENOUS at 09:53

## 2018-05-19 RX ADMIN — HEPARIN SODIUM 1300 UNITS/HR: 10000 INJECTION, SOLUTION INTRAVENOUS at 15:01

## 2018-05-19 RX ADMIN — PANTOPRAZOLE SODIUM 40 MG: 40 TABLET, DELAYED RELEASE ORAL at 00:48

## 2018-05-19 RX ADMIN — PANTOPRAZOLE SODIUM 40 MG: 40 TABLET, DELAYED RELEASE ORAL at 22:30

## 2018-05-19 RX ADMIN — ASPIRIN 81 MG: 81 TABLET, DELAYED RELEASE ORAL at 08:08

## 2018-05-19 RX ADMIN — INSULIN GLARGINE 10 UNITS: 100 INJECTION, SOLUTION SUBCUTANEOUS at 22:30

## 2018-05-19 RX ADMIN — SODIUM CHLORIDE 250 ML: 9 INJECTION, SOLUTION INTRAVENOUS at 00:49

## 2018-05-19 RX ADMIN — INSULIN ASPART 3 UNITS: 100 INJECTION, SOLUTION INTRAVENOUS; SUBCUTANEOUS at 12:16

## 2018-05-19 RX ADMIN — HEPARIN SODIUM 1450 UNITS/HR: 10000 INJECTION, SOLUTION INTRAVENOUS at 17:09

## 2018-05-19 RX ADMIN — POTASSIUM CHLORIDE 40 MEQ: 2 INJECTION, SOLUTION, CONCENTRATE INTRAVENOUS at 04:57

## 2018-05-19 RX ADMIN — MAGNESIUM SULFATE IN WATER 4 G: 40 INJECTION, SOLUTION INTRAVENOUS at 00:48

## 2018-05-19 RX ADMIN — LEVOTHYROXINE SODIUM 200 MCG: 200 TABLET ORAL at 04:00

## 2018-05-19 NOTE — ASSESSMENT & PLAN NOTE
Pre op CABG surgery work up underway including;  Pre op labs  TTE scheduled for 5/19  Carotid US  CXR  PFT's  ABG  Follow troponin levels  Smoking cessation consult  Encourage IS use to optimize lung fxn pre op  Consider endocrine consult if HgbA1C is elevated  OOB as tolerated

## 2018-05-19 NOTE — ASSESSMENT & PLAN NOTE
STEMI at OSH treated medically, with resolved MARBIN by the time of transfer to [facility]; LHC demonstrated: multivessel disease, 80% pLAD and 70% mid LAD; 60% superior branch of OM 1 and 95% of the inferior branch of OM 1    -plan for CABG revascularization; CT surgery consulted, keep NPO  -keep on Hepairn gtt for now  -trend Troponin  -aspirin   -statin prohibited by elevated LFTs  -hypotension prohibits BB initiation   -TTE in the morning

## 2018-05-19 NOTE — ASSESSMENT & PLAN NOTE
At OSH, blood sugars were controlled Lantus 10 qHS with sliding scale    -hold Lantus as keeping NPO  -continue SSI insulin novolog

## 2018-05-19 NOTE — ASSESSMENT & PLAN NOTE
Incarcerated fat-containing hernia seen on [facility] CT abdomen    -surgery consulted; intervention delayed till after revascularization  -currently pain free

## 2018-05-19 NOTE — H&P
Internal Medicine  History & Physical        CHIEF COMPLAINT   Myocardial infarction     HISTORY OF PRESENT ILLNESS      This is a 77 y.o. male with a past medical history of Dm2, HTN, hypothyroid who was transferred from an OSH for management of ACS.    The patient was admitted to [facility] on 5/17 when he presented with abdominal pain, found to have incarcerated fat-containing hernia. Patient subsequently developed pressure-like chest pain. EKG demonstrated transient ST elevations in leads II, III, AvF abd V5, V6. This was associated with Troponin elevations up to .025 to .044. He was observed in the unit on heparin and nitro gtt. The patient is transferred today to [facility] where he underwent a LHC upon arrival which demonstrated multivessel disease. Findings as follows:  1. 80-90% proximal LAD and 70% mid LAD lesions.   2. 80% stenosis of the superior branch of OM 1 and 95% stenosis of the inferior branch of OM 1 (culprit lesion).  3. Non-obstructive disease in the RCA.     Patient seen at bedside, states he feels comfortable without compalints. Denies Cp, dyspnea, presyncope    PAST MEDICAL AND SURGICAL HISTORY      PMHx:  Past Medical History:   Diagnosis Date   • Coronary artery disease    • Diabetes mellitus type I (CMS/HCC) ([facility])    • Hypertension    • Hypothyroidism    • Incarcerated hernia    • Lipid disorder    • Myocardial infarction    • Nephrolithiasis        PSHx:  Past Surgical History:   Procedure Laterality Date   • ADENOIDECTOMY     • FRACTURE SURGERY     • SKIN BIOPSY     • SKIN CANCER EXCISION     • TONSILLECTOMY         PCP:   No Pcp  Pt States    MEDICATIONS      Prior to Admission medications    Not on File       Home medications were personally reviewed.    ALLERGIES      Patient has no known allergies.    FAMILY HISTORY      Family History   Problem Relation Age of Onset   • Heart disease Mother    • Suicide Attempts Father    • Depression Father    • Heart disease  Brother        SOCIAL HISTORY      Social History     Social History   • Marital status:      Spouse name: N/A   • Number of children: N/A   • Years of education: N/A     Occupational History   • retired     • retired paper[employer]       Social History Main Topics   • Smoking status: Current Every Day Smoker     Packs/day: 0.25     Years: 65.00     Types: Cigarettes, Other (see comments)   • Smokeless tobacco: Never Used   • Alcohol use No   • Drug use: No   • Sexual activity: No     Other Topics Concern   • None     Social History Narrative    Lives alone in an apartment/efficiency.  Does have elevator.  Daughter is local and is power of        REVIEW OF SYSTEMS      Review of Systems   All other systems reviewed and are negative.      PHYSICAL EXAMINATION      Temp:  [36.9 °C (98.5 °F)-37.1 °C (98.7 °F)] 36.9 °C (98.5 °F)  Heart Rate:  [62-72] 69  Resp:  [16-37] 25  BP: ()/(44-64) 122/56  Body mass index is 35.07 kg/m².    Physical Exam   Constitutional: He is oriented to person, place, and time. He appears well-developed and well-nourished.   HENT:   Head: Normocephalic and atraumatic.   Eyes: Pupils are equal, round, and reactive to light.   Cardiovascular: Normal rate, regular rhythm and normal heart sounds.  Exam reveals no gallop and no friction rub.    No murmur heard.  Pulmonary/Chest: Effort normal. No respiratory distress. He has no wheezes. He has no rales.   Abdominal: Soft. Bowel sounds are normal. He exhibits no distension. There is no tenderness. There is no rebound and no guarding.   Musculoskeletal: He exhibits no edema, tenderness or deformity.   Neurological: He is alert and oriented to person, place, and time. No cranial nerve deficit.   Skin: Skin is warm. No rash noted. No erythema.   Psychiatric: He has a normal mood and affect. His behavior is normal.   Nursing note and vitals reviewed.      LABS / IMAGING / EKG        Labs  Troponin 37.6  Lactate 1.6    Imaging  CXR:  clear no acute change    ECG/Telemetry  Subtle MARBIN in inferior and lateral precordial leads, much improved compared to EKGs from OSH    ASSESSMENT AND PLAN           Acute coronary syndrome   Assessment & Plan    ACS at OSH treated medically, with resolved MARBIN by the time of transfer to [facility]; [facility] demonstrated: multivessel disease, 80-90% pLAD and 70% mid LAD; 80% superior branch of OM 1 and 95% of the inferior branch of OM 1    -plan for CABG revascularization; CT surgery consulted,   -keep on Hepairn gtt for now  -trend Troponin  -aspirin   -statin prohibited by elevated LFTs  -low dose metoprolol tar for BB which can be converted to succinate later if tolerated by BP  -TTE in the morning        Incarcerated hernia   Assessment & Plan    Incarcerated fat-containing hernia seen on [facility] CT abdomen    -surgery consulted; intervention delayed till after revascularization  -currently pain free        Type 2 diabetes mellitus (CMS/HCC) (HCC)   Overview    DM 2 on invokana, Lantus and metformin therapies at home     Assessment & Plan    At OSH, blood sugars were controlled Lantus 10 qHS with sliding scale    -hold Lantus as keeping NPO  -continue SSI insulin novolog        Hypothyroidism   Assessment & Plan    -continue Levothyroxine             VTE Assessment: Padua    Code Status: Full Code  Estimated discharge date: 5/18/2018     ATTENDING DOCUMENTATION  ALSO SEE ATTENDING ATTESTATION SECTION OF NOTE     I performed a history and physical examination of the patient and discussed the management with the Fellow. I reviewed the Fellow's note and agree with the documented findings and plan of care, except for my comments below or within the additional notes today.   The note is edited to reflect my assessment and plan.    - I discussed at length before and after the cardiac catheterization with the patient, family and Dr. Torin condon from CT surgery.  Difficult situation.  Patient had acute  coronary syndrome with transient ST elevation yesterday at [facility], a conservative approach was pursued by cardiologist at that hospital due to concomitant presence of possible incarcerated hernia.  By the time the patient presented to [facility] he was asymptomatic and EKG showed complete resolution of the ST elevation.  Cardiac catheterization shows critical 2 vessel coronary artery disease as outlined above.  Case was discussed at length with CT surgery and consensus decision was made for coronary bypass surgery.  And anatomy favors surgical revascularization and also evidence of dual antiplatelet therapy associated with PCI is desirable in the setting of possible incarcerated hernia and need for abdominal surgery in the future.  Dr. condon would like to perform the coronary artery bypass surgery over the next 42-72 hours.  In the meantime patient will be monitored in the intensive care unit.  We will continue heparin infusion aspirin and beta-blocker if tolerated by blood pressure.  If he develops refractory symptoms or dynamic EKG changes then I will place an intra-aortic balloon pump.  Patient will be closely monitored along with CT surgery.    Ignacio Rg MD

## 2018-05-19 NOTE — ANESTHESIA PREPROCEDURE EVALUATION
Anesthesia ROS/MED HX    Anesthesia History    Previous anesthetics  No history of anesthetic complications  Pulmonary - neg  Neuro/Psych - neg  Cardiovascular   TURPIN   CAD   ECG reviewed  Abnormal ECG   dyslipidemia   hypertension   Past MI  GI/Hepatic- neg  Musculoskeletal- neg  Endo/Other   Diabetes (Type 1)   Hypothyroidism   Chronic renal disease (Nephrolithiasis)   Body Habitus: Overweight  ROS/MED HX Comments:    Endo: Incarcerated hernia    5/19/18 TTE  · Low normal systolic function. Estimated EF = 50%. Grade I diastolic dysfunction.Hypokinesis of the lateral and inferolateral wall.  · Calcified aortic valve with mild aortic stenosis with mean gradient of 10mmHg.  · Normal-sized right ventricular cavity with preserved systolic function.  · Trace tricuspid valve regurgitation.  · Mild mitral valve regurgitation. Sclerotic mitral valve.  · Mildly dilated left atrium.    5/19/18 CT of Chest  1.  Moderate calcified coronary plaque.  2.  No acute cardiopulmonary abnormality.  3.  Moderate-sized hiatal hernia.    5/19/18 Chest Xray  No evidence of acute pulmonary disease    Past Surgical History:   Procedure Laterality Date   • ADENOIDECTOMY     • FRACTURE SURGERY     • SKIN BIOPSY     • SKIN CANCER EXCISION     • TONSILLECTOMY        The patient was admitted to [facility] on 5/17 when he presented with abdominal pain, found to have incarcerated fat-containing hernia. Patient subsequently developed pressure-like chest pain. EKG demonstrated transient ST elevations in leads II, III, AvF abd V5, V6. This was associated with Troponin elevations up to .025 to .044. He was observed in the unit on heparin and nitro gtt. The patient is transferred today to [facility] where he underwent a LHC upon arrival which demonstrated multivessel disease. Findings as follows:  1. 80-90% proximal LAD and 70% mid LAD lesions.   2. 80% stenosis of the superior branch of OM 1 and 95% stenosis of the inferior branch of OM  1 (culprit lesion).  3. Non-obstructive disease in the RCA.     CBC Results       05/19/18 05/18/18 05/18/18                    0154 2126 1727         WBC 9.19 10.36 10.18         RBC 3.66 (L) 4.38 (L) 4.13 (L)         HGB 9.0 (L) 10.6 (L) 10.1 (L)         HCT 29.4 (L) 34.3 (L) 32.2 (L)         MCV 80.3 (L) 78.3 (L) 78.0 (L)         MCH 24.6 (L) 24.2 (L) 24.5 (L)         MCHC 30.6 (L) 30.9 (L) 31.4 (L)          349 356 (H)                     BMP Results       05/19/18 05/18/18 05/18/18 0154 2126 1727          137 135 (L)         K 3.7 4.1 3.8         Cl 105 103 102         CO2 25 24 23         Glucose 145 (H) 164 (H) 156 (H)         BUN 15 14 13         Creatinine 1.1 1.2 1.1         Calcium 8.4 (L) 9.0 8.9         Anion Gap 7 10 10         EGFR &gt;60.0 58.4 (L) &gt;60.0                       Prior to Admission medications    Not on File         Physical Exam    Airway   Mallampati: III   TM distance: >3 FB   Neck ROM: full  Cardiovascular    Rhythm: regular   Rate: normal  Pulmonary - normal   clear to auscultation  Dental    Teeth Problems: missing        Anesthesia Plan    Plan: general    Technique: general endotracheal     Lines and Monitors: central line, PA catheter, arterial line, MADELINE, CVP and additional IV     Airway: oral intubation   ASA 4  Blood Products:     Use of Blood Products Discussed: Yes     Consented to blood products  Anesthetic plan and risks discussed with: patient  Induction:    intravenous   Postop Plan:   Patient Disposition: ICU planned admission   Pain Management: IV analgesics

## 2018-05-19 NOTE — CONSULTS
"General Surgery Consult    Subjective     Maximus Daniel is a 77 y.o. male who  Presented to OSH with chest pain and abdominal pain 5/17.  A that time patient was having elevated troponins and a CT scan was obtained which showed a fat containing incarcerated left inguinal hernia.  On presentation patient has a WBC of 7, afebrile vital signs stable. Patient has been passing gas and having bowel movements. Patient denies any f/c/n/v/cp.  Patient was unaware of having an inguinal hernia but patient was unsure of any bulges down in that area and has a large pannus which covers it. Patient has never been diagnosed with a hernia and never has had any abdominal operations.  Patient notes increasing lethargy over the past couple weeks.  Patient noticed increasing SOB with exercise and decreased exercise tolerance.    On arrival pt had cardiac catheterization with Dr. Scruggs which showed lesions in LAD and circumflex per verbal report.    Medical History: arthritis, diabetes, HTN, hypothyroidism    Surgical History: none  Social History:   Current smoker, denies drinking or alcohol use    Family History: No family history on file.    Allergies: Patient has no known allergies.    Home Medications:      Current Medications:  •  aspirin, , ,   •  [START ON 5/19/2018] aspirin, 81 mg, oral, Daily  •  glucose, 16-32 g of dextrose, oral, PRN **OR** dextrose, 15-30 g of dextrose, oral, PRN **OR** glucagon, 1 mg, intramuscular, PRN **OR** dextrose in water, 25 mL, intravenous, PRN  •  heparin (porcine), , ,   •  heparin (porcine), 15-30 Units/kg (Adjusted), intravenous, q6h PRN  •  heparin (porcine), 4,000 Units, intravenous, Once  •  heparin infusion - MAR calculator by PTT, 0-1,900 Units/hr, intravenous, Titrated  •  lidocaine PF, , ,     Review of Systems  All other systems reviewed and negative except as noted in the HPI.    Objective     Physicial Exam  /64   Pulse 70   Resp (!) 36   Ht 1.6 m (5' 3\")   Wt 89.8 kg (198 lb) "   SpO2 94%   BMI 35.07 kg/m²     Physical Exam     General appearance: alert and appears stated age, obese, NAD  Head: normocephalic, without obvious abnormality, atraumatic  Eyes: conjunctivae/corneas clear. PERRL, EOM's intact. Fundi benign.  Neck: no adenopathy, no carotid bruit, no JVD, supple, symmetrical, trachea midline and thyroid not enlarged, symmetric, no tenderness/mass/nodules  Back: symmetric, no curvature. ROM normal. No CVA tenderness.  Lungs: clear to auscultation bilaterally  Chest wall: no tenderness  Heart: regular rate and rhythm, S1, S2 normal, no murmur, click, rub or gallop  Abdomen: soft, obese, non tender, no rebound, no guarding,  Large pannus  asx umbilical hernia, left inguinal hernia, not fully reducible, some tenderness on deep palpation, no skin changes  Rectal: deferred  Extremities: extremities normal, warm and well-perfused; no cyanosis, clubbing, or edema  Pulses: 2+ and symmetric  Skin: Skin color, texture, turgor normal. No rashes or lesions  Lymph nodes: Cervical, supraclavicular, and axillary nodes normal.  Neurologic: Grossly normal      Labs  CBC Results       05/18/18                          1727           WBC 10.18           RBC 4.13 (L)           HGB 10.1 (L)           HCT 32.2 (L)           MCV 78.0 (L)           MCH 24.5 (L)           MCHC 31.4 (L)            (H)                         BMP Results       05/18/18                          1727            (L)           K 3.8           Cl 102           CO2 23           Glucose 156 (H)           BUN 13           Creatinine 1.1           Calcium 8.9           Anion Gap 10           EGFR &gt;60.0                             Imaging  OSH CT imaging personally reviewed as noted above.    Assessment     Pt is a 78 yo M presented with significant cardiac lesions in LAD and circumflex on cath in need of revascularization in presence of left incarcerated fat containing inguinal hernia        Plan     In reviewing  his CT scan and physical exam appears hernia has been present for some time and is not obstructing, no skin changes.  There is no urgency for surgical fixation of left inguinal hernia at this time. Patient would benefit from dealing with his more pressing cardiac issues and can readdress repair of this hernia after.  If patient would become clinically obstructed, have increased pain this would warrant a more expedited repair.  This plan was discussed with Dr. Wells.    Please page [phone] with any questions.    Bell Witt MD

## 2018-05-19 NOTE — PROGRESS NOTES
General Surgery Daily Progress Note    Subjective   Pt had one episode of hypotension overnight for which he received 500 cc bolus and responded. Hep gtt running at 1150.    Interval History: none.       Objective     Vital signs in last 24 hours:  Temp:  [36.9 °C (98.5 °F)-37.1 °C (98.7 °F)] 36.9 °C (98.5 °F)  Heart Rate:  [62-72] 69  Resp:  [16-37] 25  BP: ()/(44-64) 122/56      Intake/Output Summary (Last 24 hours) at 05/19/18 0609  Last data filed at 05/19/18 0300   Gross per 24 hour   Intake               66 ml   Output              300 ml   Net             -234 ml     Intake/Output this shift:  I/O this shift:  In: 66 [I.V.:66]  Out: 300 [Urine:300]    Physical Exam       General appearance: alert and appears stated age, obese, NAD  Head: normocephalic, without obvious abnormality, atraumatic  Eyes: conjunctivae/corneas clear. PERRL, EOM's intact. Fundi benign.  Neck: no adenopathy, no carotid bruit, no JVD, supple, symmetrical, trachea midline and thyroid not enlarged, symmetric, no tenderness/mass/nodules  Back: symmetric, no curvature. ROM normal. No CVA tenderness.  Lungs: clear to auscultation bilaterally  Chest wall: no tenderness  Heart: regular rate and rhythm, S1, S2 normal, no murmur, click, rub or gallop  Abdomen: soft, obese, non tender, no rebound, no guarding,  Large pannus  asx umbilical hernia, left inguinal hernia, not fully reducible, some tenderness on deep palpation, no skin changes  Rectal: deferred  Extremities: extremities normal, warm and well-perfused; no cyanosis, clubbing, or edema  Pulses: 2+ and symmetric  Skin: Skin color, texture, turgor normal. No rashes or lesions  Lymph nodes: Cervical, supraclavicular, and axillary nodes normal.  Neurologic: Grossly normal    VTE Assessment: I have reassessed and the patient's VTE risk and treatment plan is appropriate.    Labs  CBC Results       05/19/18 05/18/18 05/18/18                    0154 [id number] [id number]         WBC 9.19 10.36 10.18          RBC 3.66 (L) 4.38 (L) 4.13 (L)         HGB 9.0 (L) 10.6 (L) 10.1 (L)         HCT 29.4 (L) 34.3 (L) 32.2 (L)         MCV 80.3 (L) 78.3 (L) 78.0 (L)         MCH 24.6 (L) 24.2 (L) 24.5 (L)         MCHC 30.6 (L) 30.9 (L) 31.4 (L)          349 356 (H)                     BMP Results       05/19/18 05/18/18 05/18/18                    0154 [id number] 1727          137 135 (L)         K 3.7 4.1 3.8         Cl 105 103 102         CO2 25 24 23         Glucose 145 (H) 164 (H) 156 (H)         BUN 15 14 13         Creatinine 1.1 1.2 1.1         Calcium 8.4 (L) 9.0 8.9         Anion Gap 7 10 10         EGFR &gt;60.0 58.4 (L) &gt;60.0                         Imaging  Images reviewed  Ct: fat containing incarcerated left inguinal hernia      Assessment/Plan   Pt is a 76 yo M presented with significant cardiac lesions in LAD and circumflex on cath in need of revascularization in presence of left incarcerated fat containing inguinal hernia           Plan         In reviewing his CT scan and physical exam appears hernia has been present for some time and is not obstructing, no skin changes.  There is no urgency for surgical fixation of left inguinal hernia at this time. Patient would benefit from dealing with his more pressing cardiac issues and can readdress repair of this hernia after.  If patient would become clinically obstructed, have increased pain this would warrant a more expedited repair.  This plan was discussed with Dr. Wells.     Please page [phone] with any questions.    Expected Discharge Date:   5/18/2018        Bell Witt MD           I have seen and examined patient, data reviewed, discussed with resident.  Hernias long-standing.  He will proceed with his cardiac surgery and has hernia addressed after he recovers from his procedure.

## 2018-05-19 NOTE — PLAN OF CARE
Problem: Cardiac Surgery (Adult)  Goal: Signs and Symptoms of Listed Potential Problems Will be Absent, Minimized or Managed (Cardiac Surgery)  Outcome: Ongoing (interventions implemented as appropriate)    Goal: Anesthesia/Sedation Recovery  Outcome: Ongoing (interventions implemented as appropriate)

## 2018-05-19 NOTE — PLAN OF CARE
Problem: Patient Care Overview  Goal: Plan of Care Review   05/19/18 1811   Coping/Psychosocial   Plan Of Care Reviewed With patient;family   Plan of Care Review   Progress progress toward functional goals as expected   Outcome Summary vss no chest pain today-cabg in am

## 2018-05-19 NOTE — PROGRESS NOTES
Pt eval today. He is without pain and enjoying breakfast did have asymptomatic hypotension overnight SBP 70's improved with NSS bolus. Currently BP 90/50.  Troponin remains high at 30 but trending down from 43. Plan for surgery when troponin<5. Reviewed with ICU team. Added CT scan of chest to preoperative diagnostics. Advised for endocrine consult with Hgb Alc 10.5. PFTS, US of carotids, and ABG still pending. Pt advised of current plan. Please pg [phone] with any concerns.

## 2018-05-19 NOTE — PLAN OF CARE
Problem: Pressure Ulcer Risk (Edison Scale) (Adult,Obstetrics,Pediatric)  Goal: Identify Related Risk Factors and Signs and Symptoms  Outcome: Ongoing (interventions implemented as appropriate)    Goal: Skin Integrity  Outcome: Ongoing (interventions implemented as appropriate)

## 2018-05-19 NOTE — CONSULTS
"Brief Nutrition Note    Recommendations   Agree with diet, add 2000cal to order   Follow with education prn     Nutrition Charting Type: Nutrition Brief Assessment    Clinical Course: Patient is a 77 y.o. male who was admitted on 5/18/2018 with a diagnosis of Chest pain [R07.9].     Past Medical History:   Diagnosis Date   • Coronary artery disease    • Diabetes mellitus type I (CMS/HCC) (HCC)    • Hypertension    • Hypothyroidism    • Incarcerated hernia    • Lipid disorder    • Myocardial infarction    • Nephrolithiasis      Past Surgical History:   Procedure Laterality Date   • ADENOIDECTOMY     • FRACTURE SURGERY     • SKIN BIOPSY     • SKIN CANCER EXCISION     • TONSILLECTOMY         General Information  Nutrition Evaluation/Patient Follow-Up: Currently not Nutritionally [id number]-[id number]-gx in 5-7 days  Reason for Consult: Provider order     Physical Appearance  Last Bowel Movement: 05/16/18     Nutrition Order Review  Nutrition Order Review: meets nutritional requirements  Nutrition Order Review Comments: cardiac, NCS     Anthropometrics  Height: 160 cm (5' 3\")     Current Weight  Weight: 89.8 kg (198 lb)     Ideal Body Weight (IBW)  Ideal Body Weight (IBW) (kg): 56.92  % Ideal Body Weight: 157.78     Body Mass Index (BMI)  BMI (Calculated): 35.1     Lab Results  Lab Results Reviewed:   BMP Results       05/19/18 05/18/18 05/18/18                    0154 2126 1727          137 135 (L)         K 3.7 4.1 3.8         Cl 105 103 102         CO2 25 24 23         Glucose 145 (H) 164 (H) 156 (H)         BUN 15 14 13         Creatinine 1.1 1.2 1.1         Calcium 8.4 (L) 9.0 8.9         Anion Gap 7 10 10         EGFR &gt;60.0 58.4 (L) &gt;60.0                              Pertinent Medications  Pertinent Medications Reviewed: heparin, cardene, novolog, lantus, Kcl, synthroid     Skin: intact     Clinical comments: Pt adm with NSTEMI, diet just ordered. Ate well PTA. NKFA. HX Dm, add consistent carb diet "     Goals: > 75% meals   Monitor: labs, skin, po diet yuval, BG     Recommendations: See above       Date: 05/19/18  Signature: Lola Colon RD

## 2018-05-19 NOTE — NURSING NOTE
Pre op instructions printed and given to patient -reviewed with pt and family no questions asked at this time. Incentive chris instructions given- pt demonstrated appropriately 2000 done on insentive chris

## 2018-05-19 NOTE — PROGRESS NOTES
Writing in retrospect, patient became hypotensive to MAPs 50s overnight. Completely asymptomatic, denies Cp/dyspnea/prescynope. Metoprolol was not given. EKG shows at the most 0.5 mm MARBIN in V5, V6, II, III, aVF. Trop 37 (first post cath). I performed a POC TTE, systolic function appears grossly normal on subcostal view. 500 cc bolus was given to which his BP responded nicely 100/50s. Continues to be asymptomatic. Trop trended up to 43  -continue to monitor Trop  -discussed with cards fellow aware

## 2018-05-19 NOTE — H&P
Cardiac Surgery H&P      Chief Complaint:  CP and abdominal pain     HPI: Maximus Daniel is a 77 y.o. male who presented to [facility] on 5/17 with complaint of chest pain and lower abd pain.  He states he was napping when pain first started.  He attempted to take TUMS without relief.  Chest pain did not radiate to jaw, arms, or back.  He did have associated diaphoresis. Also had abdominal pain across lower quadrants.  Denies assoc N/V.    Pt states he had similar chest pain approx. 1 week ago which resolved after resting.  Has not had similar symptoms in the past.  Pt was found to have elevated troponin as well as left incarcerated hernia.  Pt transferred to [facility] 5/18 for cardiac cath which revealed MVD.  Cardiac surgical evaluation requested.      Pertinent radiology results reviewed. Pertinent lab results reviewed.    Past Medical History:   Diagnosis Date   • Coronary artery disease    • Diabetes mellitus type I (CMS/HCC) (HCC)    • Hypertension    • Hypothyroidism    • Incarcerated hernia    • Lipid disorder    • Myocardial infarction    • Nephrolithiasis        Past Surgical History:   Procedure Laterality Date   • ADENOIDECTOMY     • FRACTURE SURGERY     • SKIN BIOPSY     • SKIN CANCER EXCISION     • TONSILLECTOMY         Patient has no known allergies.     Prior to Admission medications    Not on File       Current Facility-Administered Medications   Medication Dose Route Frequency Provider Last Rate Last Dose   • aspirin 325 mg tablet  - Pyxis Override Pull            • [START ON 5/19/2018] aspirin enteric coated tablet 81 mg  81 mg oral Daily Jeremy C Boxer, MD       • glucose chewable tablet 16-32 g of dextrose  16-32 g of dextrose oral PRN Jeremy C Boxer, MD        Or   • dextrose 40 % oral gel 15-30 g of dextrose  15-30 g of dextrose oral PRN Jeremy C Boxer, MD        Or   • glucagon (GLUCAGEN) injection 1 mg  1 mg intramuscular PRN Jeremy C Boxer, MD        Or   • dextrose in water  injection 12.5 g  25 mL intravenous PRN Jeremy C Boxer, MD       • heparin (porcine) 1,000 unit/mL injection  - Pyxis Override Pull            • heparin (porcine) bolus from bag 1,050-2,100 Units  15-30 Units/kg (Adjusted) intravenous q6h PRN Jeremy C Boxer, MD       • heparin (porcine) bolus from bag 4,000 Units  4,000 Units intravenous Once Jeremy C Boxer, MD       • heparin infusion in D5W 100 units/mL  0-1,900 Units/hr intravenous Titrated Jeremy C Boxer, MD 10 mL/hr at 05/18/18 2300 1,000 Units/hr at 05/18/18 2300   • [START ON 5/19/2018] levothyroxine (SYNTHROID) tablet 200 mcg  200 mcg oral Daily (6:30a) SLY Bernstein       • lidocaine PF (XYLOCAINE) 10 mg/mL (1 %) injection  - Pyxis Override Pull            • pantoprazole (PROTONIX) tablet,delayed release (DR/EC) 40 mg  40 mg oral Nightly SLY Bernstein            Social History     Social History   • Marital status:      Spouse name: N/A   • Number of children: N/A   • Years of education: N/A     Occupational History   • retired     • retired [employer]       Social History Main Topics   • Smoking status: Current Every Day Smoker     Packs/day: 0.25     Years: 65.00     Types: Cigarettes, Other (see comments)   • Smokeless tobacco: Never Used   • Alcohol use No   • Drug use: No   • Sexual activity: No     Other Topics Concern   • None     Social History Narrative    Lives alone in an apartment/efficiency.  Does have elevator.  Daughter is local and is power of        Family History   Problem Relation Age of Onset   • Heart disease Mother    • Suicide Attempts Father    • Depression Father    • Heart disease Brother        Review of Systems  All other systems reviewed and negative except as noted in the HPI.    Vital signs in last 24 hours:  Temp:  [37.1 °C (98.7 °F)] 37.1 °C (98.7 °F)  Heart Rate:  [63-72] 63  Resp:  [16-37] 26  BP: ()/(44-64) 95/44    Objective     Physical Exam      General Appearance:    Alert,  cooperative, no distress, appears stated age   Head:    Normocephalic, without obvious abnormality, atraumatic   Eyes:    PERRL, conjunctiva/corneas clear, EOM's intact, fundi     benign, both eyes.  Wears bifocal eyeglasses        Ears:    Normal TM's and external ear canals, both ears   Nose:   Nares normal, septum midline, mucosa normal, no drainage    or sinus   tenderness   Throat:   Lips, mucosa, and tongue normal   Neck:   Supple, symmetrical, trachea midline, no adenopathy;        thyroid:  No enlargement/tenderness/nodules; no carotid    bruit or JVD       Lungs:     Clear to auscultation bilaterally, respirations unlabored   Chest wall:    No tenderness or deformity   Heart:    Regular rate and rhythm, S1 and S2 normal, no murmur, rub   or gallop   Abdomen:     Soft, non-tender, bowel sounds active all four quadrants,     Left hernia   Extremities:  Musculoskeletal:   Extremities normal, atraumatic, no cyanosis or edema   protruding screws noted RLE   Pulses:   2+ and symmetric all extremities   Skin:   Skin color, texture, turgor normal, no rashes or lesions   Lymph nodes:   Cervical, supraclavicular, and axillary nodes normal   Neurologic:    Behavior/  Emotional:   CNII-XII intact. Normal strength, sensation and reflexes       throughout    Appropriate, cooperative           Labs  Lab Results   Component Value Date    WBC 10.36 05/18/2018    HGB 10.6 (L) 05/18/2018    HCT 34.3 (L) 05/18/2018     05/18/2018    ALT 53 05/18/2018     (H) 05/18/2018     05/18/2018    K 4.1 05/18/2018     05/18/2018    CREATININE 1.2 05/18/2018    BUN 14 05/18/2018    CO2 24 05/18/2018    INR 1.0 05/18/2018       Imaging  I have independently reviewed the pertinent imaging from the last 24 hrs.    ECG/Telemetry  I have independently reviewed the telemetry. No events for the last 24 hours.           Chest pain   Assessment & Plan    Pre op CABG surgery work up underway including;  Pre op labs  TTE  scheduled for 5/19  Carotid US  CXR  PFT's  ABG  Follow troponin levels  Smoking cessation consult  Encourage IS use to optimize lung fxn pre op  Consider endocrine consult if HgbA1C is elevated  OOB as tolerated        Hyperlipidemia   Assessment & Plan    Obtain lipid profile  Statin therapy        Hypothyroidism   Assessment & Plan    Acquire T4 level  Continue synthroid         Incarcerated hernia   Assessment & Plan    General Surgery consulted  Monitor for S&S of strangulation        Type 1 diabetes mellitus (CMS/HCC) (HCC)   Assessment & Plan    Monitor blood glucose  Check HbgA1C  Hold metformin pre op            SLY Salazar  5/18/2018Daily Progress Note    No new subjective & objective note has been filed under this hospital service since the last note was generated.    Assessment & Plan  Chest pain   Assessment & Plan    Pre op CABG surgery work up underway including;  Pre op labs  TTE scheduled for 5/19  Carotid US  CXR  PFT's  ABG  Follow troponin levels  Smoking cessation consult  Encourage IS use to optimize lung fxn pre op  Consider endocrine consult if HgbA1C is elevated  OOB as tolerated        Hyperlipidemia   Assessment & Plan    Obtain lipid profile  Statin therapy        Hypothyroidism   Assessment & Plan    Acquire T4 level  Continue synthroid         Incarcerated hernia   Assessment & Plan    General Surgery consulted  Monitor for S&S of strangulation        Type 1 diabetes mellitus (CMS/HCC) (HCC)   Assessment & Plan    Monitor blood glucose  Check HbgA1C  Hold metformin pre op            Expected Discharge Date:  5/18/2018

## 2018-05-19 NOTE — PROGRESS NOTES
"     Internal Medicine  ICU/CCU Daily Progress Note       SUBJECTIVE   This is a 77 y.o. year-old male admitted on 2018 with Chest pain [R07.9].    Interval History: Hypotensive overnight to 70/40's MAP of 50 but mentation intact, denies chest pain, abdominal pain, dizziness, headache, lightheadedness, dyspnea. He states \"I feel fine\". BP responded to 250cc bolus. He did not get any anti-hypertensive medications. ECG unchanged from the prior one.     OBJECTIVE      Vital signs in last 24 hours:  Temp:  [36.9 °C (98.5 °F)-37.1 °C (98.7 °F)] 36.9 °C (98.5 °F)  Heart Rate:  [62-81] 66  Resp:  [16-37] 33  BP: ()/(44-64) 95/53  Temp (24hrs), Av °C (98.6 °F), Min:36.9 °C (98.5 °F), Max:37.1 °C (98.7 °F)    Intake/Output     Intake Day 18 0700 - 18 1459 Evening 18 1500 - 18 2259 Night 18 2300 - 18 0659 Output Day 18 0700 - 18 1459 Evening 18 1500 - 18 2259 Night 18 2300 - 18 0659    I.V. -- 16 84.1 Urine -- -- 300    IV Piggyback -- -- 250                    Total -- 16 334.1 Total -- -- 300    Last 3 shifts --  Intake: 350.1       Output: 300       Net: 50.1          PHYSICAL EXAMINATION      Physical Exam   Constitutional: He is oriented to person, place, and time. He appears well-developed and well-nourished. No distress.   HENT:   Head: Normocephalic.   Eyes: EOM are normal. Pupils are equal, round, and reactive to light.   Neck: No JVD present.   Cardiovascular: Normal rate and regular rhythm.    Pulmonary/Chest: Effort normal and breath sounds normal. He has no wheezes. He has no rales.   Abdominal: Soft. Bowel sounds are normal. He exhibits no distension. There is no tenderness.   Inguinal areas without bulges or redness when examined supine cannot appreciate a hernia well. Lots of adipose tissue in the area    Musculoskeletal: He exhibits no edema.   Neurological: He is alert and oriented to person, place, and time.   Strength " intact    Skin: Skin is warm and dry. He is not diaphoretic.   Psychiatric: He has a normal mood and affect. His behavior is normal.      LABS / IMAGING / TELE      L  abs  Lab Results   Component Value Date    WBC 9.19 05/19/2018    HGB 9.0 (L) 05/19/2018    HCT 29.4 (L) 05/19/2018    MCV 80.3 (L) 05/19/2018     05/19/2018     Lab Results   Component Value Date    GLUCOSE 145 (H) 05/19/2018    CALCIUM 8.4 (L) 05/19/2018     05/19/2018    K 3.7 05/19/2018    CO2 25 05/19/2018     05/19/2018    BUN 15 05/19/2018    CREATININE 1.1 05/19/2018     Imaging  I have independently reviewed the pertinent imaging from the last 24 hrs.    ECG/Telemetry  I have independently reviewed the telemetry. No events for the last 24 hours.    ASSESSMENT AND PLAN        *Acute coronary syndrome   Assessment & Plan    ACS at OSH treated medically, with resolved MARBIN by the time of transfer to [facility]; LHC demonstrated: multivessel disease, 80-90% pLAD and 70% mid LAD; 80% superior branch of OM 1 and 95% of the inferior branch of OM 1    -plan for CABG revascularization; CT surgery consulted,   -keep on Hepairn gtt for now  -trend Troponin  -aspirin   -statin avoided by elevated LFTs  -Hold off beta-blocker due to hypotension  -TTE in the morning         Hypothyroidism   Assessment & Plan    -continue Levothyroxine        Incarcerated hernia   Assessment & Plan    Incarcerated fat-containing hernia seen on [facility] hospital CT abdomen    -surgery consulted; intervention delayed till after revascularization  -currently pain free        Type 2 diabetes mellitus (CMS/HCC) (HCC)   Overview    DM 2 on invokana, Lantus and metformin therapies at home     Assessment & Plan    At OSH, blood sugars were controlled Lantus 10 qHS with sliding scale    -continue SSI insulin novolog                          VTE Assessment: Padua    Code Status: Full Code  Estimated discharge date: 5/18/2018     ICU CHECKLIST     Lines: Yes,  describe: 2 pIV     Landry: No    Sedation Vacation: Yes/No: no    Physical Therapy: Yes/No: no    Nutrition: Yes/No: no NPO for possible CABG     DVT Prophylaxis: Yes/No: yes therapeutic AC with heparin     GI Prophylaxis:  Yes/No: no     ATTENDING DOCUMENTATION  ALSO SEE ATTENDING ATTESTATION SECTION OF NOTE   I performed a history and physical examination of the patient and discussed the management with the Fellow. I reviewed the Fellow's note and agree with the documented findings and plan of care, except for my comments below or within the additional notes today.   The note is edited to reflect my assessment and plan.      Ignacio Rg MD

## 2018-05-20 ENCOUNTER — ANESTHESIA EVENT (INPATIENT)
Dept: INTENSIVE CARE | Facility: HOSPITAL | Age: 78
DRG: 234 | End: 2018-05-20
Payer: MEDICARE

## 2018-05-20 ENCOUNTER — ANESTHESIA (INPATIENT)
Dept: INTENSIVE CARE | Facility: HOSPITAL | Age: 78
DRG: 234 | End: 2018-05-20
Payer: MEDICARE

## 2018-05-20 ENCOUNTER — APPOINTMENT (INPATIENT)
Dept: RADIOLOGY | Facility: HOSPITAL | Age: 78
DRG: 234 | End: 2018-05-20
Attending: PHYSICIAN ASSISTANT
Payer: MEDICARE

## 2018-05-20 ENCOUNTER — ANESTHESIA (INPATIENT)
Dept: OPERATING ROOM | Facility: HOSPITAL | Age: 78
DRG: 234 | End: 2018-05-20
Payer: MEDICARE

## 2018-05-20 LAB
ANION GAP SERPL CALC-SCNC: 14 MEQ/L (ref 3–15)
APTT PPP: 30 SEC. (ref 23–35)
APTT PPP: 30 SEC. (ref 23–35)
APTT PPP: 48 SEC. (ref 23–35)
ATRIAL RATE: 66
BASE EXCESS BLDA CALC-SCNC: -2 MMOL/L
BASE EXCESS BLDA CALC-SCNC: -2.2 MMOL/L
BASE EXCESS BLDA CALC-SCNC: -2.3 MMOL/L
BASE EXCESS BLDA CALC-SCNC: -2.6 MMOL/L
BASE EXCESS BLDA CALC-SCNC: -2.9 MMOL/L
BASE EXCESS BLDA CALC-SCNC: -3.2 MMOL/L
BASE EXCESS BLDA CALC-SCNC: -3.5 MMOL/L
BASE EXCESS BLDA CALC-SCNC: -3.9 MMOL/L
BASE EXCESS BLDA CALC-SCNC: -4.2 MMOL/L
BASE EXCESS BLDA CALC-SCNC: -6.5 MMOL/L
BASE EXCESS BLDA CALC-SCNC: -8.7 MMOL/L
BUN SERPL-MCNC: 19 MG/DL (ref 8–20)
CA-I BLD-SCNC: 0.71 MMOL/L (ref 1.15–1.27)
CA-I BLD-SCNC: 0.9 MMOL/L (ref 1.15–1.27)
CA-I BLD-SCNC: 0.9 MMOL/L (ref 1.15–1.27)
CA-I BLD-SCNC: 0.92 MMOL/L (ref 1.15–1.27)
CA-I BLD-SCNC: 0.95 MMOL/L (ref 1.15–1.27)
CA-I BLD-SCNC: 1.07 MMOL/L (ref 1.15–1.27)
CA-I BLD-SCNC: 1.08 MMOL/L (ref 1.15–1.27)
CA-I BLD-SCNC: 1.13 MMOL/L (ref 1.15–1.27)
CA-I BLD-SCNC: 1.21 MMOL/L (ref 1.15–1.27)
CA-I BLD-SCNC: 1.22 MMOL/L (ref 1.15–1.27)
CA-I BLD-SCNC: 1.23 MMOL/L (ref 1.15–1.27)
CALCIUM SERPL-MCNC: 8 MG/DL (ref 8.9–10.3)
CHLORIDE SERPL-SCNC: 100 MMOL/L (ref 98–109)
CO2 BLDA-SCNC: 20 MMOL/L (ref 22–32)
CO2 BLDA-SCNC: 21 MMOL/L (ref 22–32)
CO2 BLDA-SCNC: 22 MMOL/L (ref 22–32)
CO2 BLDA-SCNC: 23 MMOL/L (ref 22–32)
CO2 BLDA-SCNC: 24 MMOL/L (ref 22–32)
CO2 BLDA-SCNC: 25 MMOL/L (ref 22–32)
CO2 SERPL-SCNC: 20 MMOL/L (ref 22–32)
CREAT SERPL-MCNC: 1.2 MG/DL (ref 0.8–1.3)
ERYTHROCYTE [DISTWIDTH] IN BLOOD BY AUTOMATED COUNT: 15.6 % (ref 11.6–14.4)
ERYTHROCYTE [DISTWIDTH] IN BLOOD BY AUTOMATED COUNT: 16 % (ref 11.6–14.4)
ERYTHROCYTE [DISTWIDTH] IN BLOOD BY AUTOMATED COUNT: 16.4 % (ref 11.6–14.4)
ERYTHROCYTE [DISTWIDTH] IN BLOOD BY AUTOMATED COUNT: 16.5 % (ref 11.6–14.4)
ERYTHROCYTE [DISTWIDTH] IN BLOOD BY AUTOMATED COUNT: 16.8 % (ref 11.6–14.4)
ERYTHROCYTE [DISTWIDTH] IN BLOOD BY AUTOMATED COUNT: 16.8 % (ref 11.6–14.4)
FIBRINOGEN PPP-MCNC: 528 MG/DL (ref 220–480)
FIBRINOGEN PPP-MCNC: 531 MG/DL (ref 220–480)
FIBRINOGEN PPP-MCNC: 748 MG/DL (ref 220–480)
GFR SERPL CREATININE-BSD FRML MDRD: 58.4 ML/MIN/1.73M*2
GLUCOSE BLDA-MCNC: 136 MG/DL (ref 65–95)
GLUCOSE BLDA-MCNC: 149 MG/DL (ref 65–95)
GLUCOSE BLDA-MCNC: 156 MG/DL (ref 65–95)
GLUCOSE BLDA-MCNC: 166 MG/DL (ref 65–95)
GLUCOSE BLDA-MCNC: 167 MG/DL (ref 65–95)
GLUCOSE BLDA-MCNC: 175 MG/DL (ref 65–95)
GLUCOSE BLDA-MCNC: 186 MG/DL (ref 65–95)
GLUCOSE BLDA-MCNC: 189 MG/DL (ref 65–95)
GLUCOSE BLDA-MCNC: 193 MG/DL (ref 65–95)
GLUCOSE BLDA-MCNC: 200 MG/DL (ref 65–95)
GLUCOSE BLDA-MCNC: 202 MG/DL (ref 65–95)
GLUCOSE SERPL-MCNC: 154 MG/DL (ref 70–99)
HCO3 BLDA-SCNC: 18 MMOL/L (ref 21–28)
HCO3 BLDA-SCNC: 20 MMOL/L (ref 21–28)
HCO3 BLDA-SCNC: 21 MMOL/L (ref 21–28)
HCO3 BLDA-SCNC: 21 MMOL/L (ref 21–28)
HCO3 BLDA-SCNC: 22 MMOL/L (ref 21–28)
HCO3 BLDA-SCNC: 23 MMOL/L (ref 21–28)
HCO3 BLDA-SCNC: 24 MMOL/L (ref 21–28)
HCT VFR BLDA CALC: 15 % (ref 36–48)
HCT VFR BLDA CALC: 21 % (ref 36–48)
HCT VFR BLDA CALC: 22 % (ref 36–48)
HCT VFR BLDA CALC: 23 % (ref 36–48)
HCT VFR BLDA CALC: 27 % (ref 36–48)
HCT VFR BLDA CALC: 30 % (ref 36–48)
HCT VFR BLDA CALC: 31 % (ref 36–48)
HCT VFR BLDA CALC: 33 % (ref 36–48)
HCT VFR BLDA CALC: 33 % (ref 36–48)
HCT VFR BLDCO AUTO: 26.2 % (ref 40–51)
HCT VFR BLDCO AUTO: 27 % (ref 40–51)
HCT VFR BLDCO AUTO: 27.8 % (ref 40–51)
HCT VFR BLDCO AUTO: 28.8 % (ref 40–51)
HCT VFR BLDCO AUTO: 29.8 % (ref 40–51)
HCT VFR BLDCO AUTO: 29.9 % (ref 40–51)
HGB BLD-MCNC: 8.3 G/DL (ref 13.7–17.5)
HGB BLD-MCNC: 8.6 G/DL (ref 13.7–17.5)
HGB BLD-MCNC: 8.8 G/DL (ref 13.7–17.5)
HGB BLD-MCNC: 9 G/DL (ref 13.7–17.5)
HGB BLD-MCNC: 9.1 G/DL (ref 13.7–17.5)
HGB BLD-MCNC: 9.7 G/DL (ref 13.7–17.5)
INR PPP: 1.6 INR
INR PPP: 1.7 INR
LACTATE BLDA-SCNC: 1.1 MMOL/L (ref 0.4–1.6)
LACTATE BLDA-SCNC: 1.4 MMOL/L (ref 0.4–1.6)
LACTATE BLDA-SCNC: 1.5 MMOL/L (ref 0.4–1.6)
LACTATE BLDA-SCNC: 1.5 MMOL/L (ref 0.4–1.6)
LACTATE BLDA-SCNC: 1.6 MMOL/L (ref 0.4–1.6)
LACTATE BLDA-SCNC: 1.7 MMOL/L (ref 0.4–1.6)
LACTATE BLDA-SCNC: 1.8 MMOL/L (ref 0.4–1.6)
LACTATE BLDA-SCNC: 2 MMOL/L (ref 0.4–1.6)
LACTATE BLDA-SCNC: 2.1 MMOL/L (ref 0.4–1.6)
MAGNESIUM SERPL-MCNC: 2.2 MG/DL (ref 1.8–2.5)
MAGNESIUM SERPL-MCNC: 2.4 MG/DL (ref 1.8–2.5)
MCH RBC QN AUTO: 24.4 PG (ref 28–33.2)
MCH RBC QN AUTO: 26.1 PG (ref 28–33.2)
MCH RBC QN AUTO: 26.3 PG (ref 28–33.2)
MCH RBC QN AUTO: 26.5 PG (ref 28–33.2)
MCH RBC QN AUTO: 26.5 PG (ref 28–33.2)
MCH RBC QN AUTO: 27.3 PG (ref 28–33.2)
MCHC RBC AUTO-ENTMCNC: 30.5 G/DL (ref 32.2–36.5)
MCHC RBC AUTO-ENTMCNC: 31.3 G/DL (ref 32.2–36.5)
MCHC RBC AUTO-ENTMCNC: 31.7 G/DL (ref 32.2–36.5)
MCHC RBC AUTO-ENTMCNC: 31.7 G/DL (ref 32.2–36.5)
MCHC RBC AUTO-ENTMCNC: 31.9 G/DL (ref 32.2–36.5)
MCHC RBC AUTO-ENTMCNC: 32.4 G/DL (ref 32.2–36.5)
MCV RBC AUTO: 79.9 FL (ref 83–98)
MCV RBC AUTO: 82.4 FL (ref 83–98)
MCV RBC AUTO: 83.1 FL (ref 83–98)
MCV RBC AUTO: 83.2 FL (ref 83–98)
MCV RBC AUTO: 84.2 FL (ref 83–98)
MCV RBC AUTO: 85 FL (ref 83–98)
P AXIS: 11
PCO2 BLDA: 35 MMHG (ref 35–48)
PCO2 BLDA: 37 MMHG (ref 35–48)
PCO2 BLDA: 37 MMHG (ref 35–48)
PCO2 BLDA: 39 MMHG (ref 35–48)
PCO2 BLDA: 40 MMHG (ref 35–48)
PCO2 BLDA: 40 MMHG (ref 35–48)
PCO2 BLDA: 42 MMHG (ref 35–48)
PCO2 BLDA: 42 MMHG (ref 35–48)
PCO2 BLDA: 43 MMHG (ref 35–48)
PCO2 BLDA: 43 MMHG (ref 35–48)
PCO2 BLDA: 46 MMHG (ref 35–48)
PDW BLD AUTO: 9.3 FL (ref 9.4–12.4)
PDW BLD AUTO: 9.4 FL (ref 9.4–12.4)
PDW BLD AUTO: 9.5 FL (ref 9.4–12.4)
PDW BLD AUTO: 9.6 FL (ref 9.4–12.4)
PDW BLD AUTO: 9.7 FL (ref 9.4–12.4)
PDW BLD AUTO: 9.8 FL (ref 9.4–12.4)
PH BLDA: 7.24 PH (ref 7.35–7.45)
PH BLDA: 7.28 PH (ref 7.35–7.45)
PH BLDA: 7.31 PH (ref 7.35–7.45)
PH BLDA: 7.32 PH (ref 7.35–7.45)
PH BLDA: 7.34 PH (ref 7.35–7.45)
PH BLDA: 7.36 PH (ref 7.35–7.45)
PH BLDA: 7.36 PH (ref 7.35–7.45)
PH BLDA: 7.37 PH (ref 7.35–7.45)
PH BLDA: 7.37 PH (ref 7.35–7.45)
PH BLDA: 7.38 PH (ref 7.35–7.45)
PH BLDA: 7.39 PH (ref 7.35–7.45)
PHOSPHATE SERPL-MCNC: 3.1 MG/DL (ref 2.4–4.7)
PLATELET # BLD AUTO: 132 K/UL (ref 150–350)
PLATELET # BLD AUTO: 149 K/UL (ref 150–350)
PLATELET # BLD AUTO: 162 K/UL (ref 150–350)
PLATELET # BLD AUTO: 196 K/UL (ref 150–350)
PLATELET # BLD AUTO: 200 K/UL (ref 150–350)
PLATELET # BLD AUTO: 366 K/UL (ref 150–350)
PO2 BLDA: 105 MMHG (ref 60–70)
PO2 BLDA: 105 MMHG (ref 60–70)
PO2 BLDA: 157 MMHG (ref 60–70)
PO2 BLDA: 240 MMHG (ref 60–70)
PO2 BLDA: 245 MMHG (ref 60–70)
PO2 BLDA: 251 MMHG (ref 60–70)
PO2 BLDA: 319 MMHG (ref 60–70)
PO2 BLDA: 351 MMHG (ref 60–70)
PO2 BLDA: 544 MMHG (ref 60–70)
PO2 BLDA: 83 MMHG (ref 60–70)
PO2 BLDA: 99 MMHG (ref 60–70)
POCT ACT-HR: 118 SECS (ref 100–140)
POCT ACT-HR: 433 SECS (ref 100–140)
POCT ACT-HR: 488 SECS (ref 100–140)
POCT ACT-HR: 504 SECS (ref 100–140)
POCT ACT-HR: 598 SECS (ref 100–140)
POCT HDR AVG ACT 1&2: 366 SECS
POCT HDR AVG ACT 3&4: 267 SECS
POCT HDR AVG ACT 5&6: 145 SECS
POCT HDR BASELINE ACT: 145 SECS
POCT HDR PROJECTED HEPARIN CONC: 3.1 MG/KG
POCT HDR SLOPE: 79 SECS/UNIT/ML (ref 63–131)
POCT HDR TARGET ACT: 480 SECS
POCT HEP TEST CONC: 3.5 MG/KG
POCT HEP TEST CONC: 4 MG/KG
POCT PATIENT TEMPERATURE: 98.6 F (ref 97–99)
POCT PROTAMINE DOSE: 330 MG
POCT PROTAMINE DOSE: 377 MG
POCT PROTOCOL HEP CONC: 3.1 MG/KG
POCT PROTOCOL HEP CONC: 3.1 MG/KG
POCT TOTAL HEPARIN REQD: 0 UNITS
POCT TOTAL HEPARIN REQD: 0 UNITS
POTASSIUM BLDA-SCNC: 3.8 MEQ/L (ref 3.4–4.5)
POTASSIUM BLDA-SCNC: 3.8 MEQ/L (ref 3.4–4.5)
POTASSIUM BLDA-SCNC: 4.2 MEQ/L (ref 3.4–4.5)
POTASSIUM BLDA-SCNC: 4.4 MEQ/L (ref 3.4–4.5)
POTASSIUM BLDA-SCNC: 4.4 MEQ/L (ref 3.4–4.5)
POTASSIUM BLDA-SCNC: 4.6 MEQ/L (ref 3.4–4.5)
POTASSIUM BLDA-SCNC: 4.8 MEQ/L (ref 3.4–4.5)
POTASSIUM BLDA-SCNC: 5 MEQ/L (ref 3.4–4.5)
POTASSIUM SERPL-SCNC: 3.8 MMOL/L (ref 3.6–5.1)
POTASSIUM SERPL-SCNC: 4.5 MMOL/L (ref 3.6–5.1)
POTASSIUM SERPL-SCNC: 4.7 MMOL/L (ref 3.6–5.1)
PR INTERVAL: 186
PROTHROMBIN TIME: 19.1 SEC. (ref 12.2–14.5)
PROTHROMBIN TIME: 20.6 SEC. (ref 12.2–14.5)
QRS DURATION: 80
QT INTERVAL: 478
QTC CALCULATION(BAZETT): 501
R AXIS: 28
RBC # BLD AUTO: 3.18 M/UL (ref 4.5–5.8)
RBC # BLD AUTO: 3.25 M/UL (ref 4.5–5.8)
RBC # BLD AUTO: 3.34 M/UL (ref 4.5–5.8)
RBC # BLD AUTO: 3.39 M/UL (ref 4.5–5.8)
RBC # BLD AUTO: 3.55 M/UL (ref 4.5–5.8)
RBC # BLD AUTO: 3.73 M/UL (ref 4.5–5.8)
SAO2 % BLDA: 100 % (ref 93–98)
SAO2 % BLDA: 96 % (ref 93–98)
SAO2 % BLDA: 97 % (ref 93–98)
SAO2 % BLDA: 98 % (ref 93–98)
SAO2 % BLDA: 98 % (ref 93–98)
SAO2 % BLDA: 99 % (ref 93–98)
SODIUM BLDA-SCNC: 119 MEQ/L (ref 136–145)
SODIUM BLDA-SCNC: 126 MEQ/L (ref 136–145)
SODIUM BLDA-SCNC: 126 MEQ/L (ref 136–145)
SODIUM BLDA-SCNC: 130 MEQ/L (ref 136–145)
SODIUM BLDA-SCNC: 131 MEQ/L (ref 136–145)
SODIUM BLDA-SCNC: 131 MEQ/L (ref 136–145)
SODIUM BLDA-SCNC: 133 MEQ/L (ref 136–145)
SODIUM BLDA-SCNC: 135 MEQ/L (ref 136–145)
SODIUM BLDA-SCNC: 136 MEQ/L (ref 136–145)
SODIUM BLDA-SCNC: 136 MEQ/L (ref 136–145)
SODIUM BLDA-SCNC: 137 MEQ/L (ref 136–145)
SODIUM SERPL-SCNC: 134 MMOL/L (ref 136–144)
T WAVE AXIS: 68
TROPONIN I SERPL-MCNC: 13.14 NG/ML
TROPONIN I SERPL-MCNC: 18.1 NG/ML
VENTRICULAR RATE: 66
WBC # BLD AUTO: 5.25 K/UL (ref 3.8–10.5)
WBC # BLD AUTO: 5.68 K/UL (ref 3.8–10.5)
WBC # BLD AUTO: 5.91 K/UL (ref 3.8–10.5)
WBC # BLD AUTO: 6.26 K/UL (ref 3.8–10.5)
WBC # BLD AUTO: 6.45 K/UL (ref 3.8–10.5)
WBC # BLD AUTO: 7.66 K/UL (ref 3.8–10.5)

## 2018-05-20 PROCEDURE — P9045 ALBUMIN (HUMAN), 5%, 250 ML: HCPCS

## 2018-05-20 PROCEDURE — 63700000 HC SELF-ADMINISTRABLE DRUG: Performed by: PHYSICIAN ASSISTANT

## 2018-05-20 PROCEDURE — 80048 BASIC METABOLIC PNL TOTAL CA: CPT | Performed by: PHYSICIAN ASSISTANT

## 2018-05-20 PROCEDURE — 36415 COLL VENOUS BLD VENIPUNCTURE: CPT | Performed by: STUDENT IN AN ORGANIZED HEALTH CARE EDUCATION/TRAINING PROGRAM

## 2018-05-20 PROCEDURE — 25800000 HC PHARMACY IV SOLUTIONS: Performed by: PHYSICIAN ASSISTANT

## 2018-05-20 PROCEDURE — 33508 ENDOSCOPIC VEIN HARVEST: CPT | Performed by: THORACIC SURGERY (CARDIOTHORACIC VASCULAR SURGERY)

## 2018-05-20 PROCEDURE — 93312 ECHO TRANSESOPHAGEAL: CPT | Performed by: STUDENT IN AN ORGANIZED HEALTH CARE EDUCATION/TRAINING PROGRAM

## 2018-05-20 PROCEDURE — 83735 ASSAY OF MAGNESIUM: CPT | Performed by: PHYSICIAN ASSISTANT

## 2018-05-20 PROCEDURE — 25000000 HC PHARMACY GENERAL: Performed by: PHYSICIAN ASSISTANT

## 2018-05-20 PROCEDURE — 85730 THROMBOPLASTIN TIME PARTIAL: CPT | Performed by: STUDENT IN AN ORGANIZED HEALTH CARE EDUCATION/TRAINING PROGRAM

## 2018-05-20 PROCEDURE — 71045 X-RAY EXAM CHEST 1 VIEW: CPT

## 2018-05-20 PROCEDURE — 84132 ASSAY OF SERUM POTASSIUM: CPT | Performed by: PHYSICIAN ASSISTANT

## 2018-05-20 PROCEDURE — 84100 ASSAY OF PHOSPHORUS: CPT | Performed by: PHYSICIAN ASSISTANT

## 2018-05-20 PROCEDURE — 63600000 HC DRUGS/DETAIL CODE

## 2018-05-20 PROCEDURE — 85027 COMPLETE CBC AUTOMATED: CPT | Performed by: THORACIC SURGERY (CARDIOTHORACIC VASCULAR SURGERY)

## 2018-05-20 PROCEDURE — 27200000 HC STERILE SUPPLY: Performed by: THORACIC SURGERY (CARDIOTHORACIC VASCULAR SURGERY)

## 2018-05-20 PROCEDURE — 25000000 HC PHARMACY GENERAL

## 2018-05-20 PROCEDURE — 3E043XZ INTRODUCTION OF VASOPRESSOR INTO CENTRAL VEIN, PERCUTANEOUS APPROACH: ICD-10-PCS | Performed by: THORACIC SURGERY (CARDIOTHORACIC VASCULAR SURGERY)

## 2018-05-20 PROCEDURE — C1713 ANCHOR/SCREW BN/BN,TIS/BN: HCPCS | Performed by: THORACIC SURGERY (CARDIOTHORACIC VASCULAR SURGERY)

## 2018-05-20 PROCEDURE — C1894 INTRO/SHEATH, NON-LASER: HCPCS | Performed by: THORACIC SURGERY (CARDIOTHORACIC VASCULAR SURGERY)

## 2018-05-20 PROCEDURE — 85347 COAGULATION TIME ACTIVATED: CPT

## 2018-05-20 PROCEDURE — 021209W BYPASS CORONARY ARTERY, THREE ARTERIES FROM AORTA WITH AUTOLOGOUS VENOUS TISSUE, OPEN APPROACH: ICD-10-PCS | Performed by: THORACIC SURGERY (CARDIOTHORACIC VASCULAR SURGERY)

## 2018-05-20 PROCEDURE — 33533 CABG ARTERIAL SINGLE: CPT | Performed by: THORACIC SURGERY (CARDIOTHORACIC VASCULAR SURGERY)

## 2018-05-20 PROCEDURE — 63600000 HC DRUGS/DETAIL CODE: Performed by: PHYSICIAN ASSISTANT

## 2018-05-20 PROCEDURE — 84484 ASSAY OF TROPONIN QUANT: CPT | Performed by: STUDENT IN AN ORGANIZED HEALTH CARE EDUCATION/TRAINING PROGRAM

## 2018-05-20 PROCEDURE — 82803 BLOOD GASES ANY COMBINATION: CPT

## 2018-05-20 PROCEDURE — 25000000 HC PHARMACY GENERAL: Performed by: STUDENT IN AN ORGANIZED HEALTH CARE EDUCATION/TRAINING PROGRAM

## 2018-05-20 PROCEDURE — 27800000 HC SUPPLY/IMPLANTS: Performed by: THORACIC SURGERY (CARDIOTHORACIC VASCULAR SURGERY)

## 2018-05-20 PROCEDURE — 85027 COMPLETE CBC AUTOMATED: CPT | Performed by: STUDENT IN AN ORGANIZED HEALTH CARE EDUCATION/TRAINING PROGRAM

## 2018-05-20 PROCEDURE — 06BQ4ZZ EXCISION OF LEFT SAPHENOUS VEIN, PERCUTANEOUS ENDOSCOPIC APPROACH: ICD-10-PCS | Performed by: THORACIC SURGERY (CARDIOTHORACIC VASCULAR SURGERY)

## 2018-05-20 PROCEDURE — 63600000 HC DRUGS/DETAIL CODE: Performed by: STUDENT IN AN ORGANIZED HEALTH CARE EDUCATION/TRAINING PROGRAM

## 2018-05-20 PROCEDURE — 86902 BLOOD TYPE ANTIGEN DONOR EA: CPT

## 2018-05-20 PROCEDURE — 94002 VENT MGMT INPAT INIT DAY: CPT

## 2018-05-20 PROCEDURE — 02HV33Z INSERTION OF INFUSION DEVICE INTO SUPERIOR VENA CAVA, PERCUTANEOUS APPROACH: ICD-10-PCS | Performed by: THORACIC SURGERY (CARDIOTHORACIC VASCULAR SURGERY)

## 2018-05-20 PROCEDURE — 36000010 HC CARDIO-PULMONARY BYPASS

## 2018-05-20 PROCEDURE — 36000015 HC OR LEVEL 5 EA ADDL MIN: Performed by: THORACIC SURGERY (CARDIOTHORACIC VASCULAR SURGERY)

## 2018-05-20 PROCEDURE — 85730 THROMBOPLASTIN TIME PARTIAL: CPT | Performed by: THORACIC SURGERY (CARDIOTHORACIC VASCULAR SURGERY)

## 2018-05-20 PROCEDURE — 63600000 HC DRUGS/DETAIL CODE: Performed by: NURSE PRACTITIONER

## 2018-05-20 PROCEDURE — 27200000 HC STERILE SUPPLY

## 2018-05-20 PROCEDURE — 85610 PROTHROMBIN TIME: CPT | Performed by: STUDENT IN AN ORGANIZED HEALTH CARE EDUCATION/TRAINING PROGRAM

## 2018-05-20 PROCEDURE — 20000000 HC ROOM AND CARE ICU

## 2018-05-20 PROCEDURE — 36000005 HC OR LEVEL 5 INITIAL 30MIN: Performed by: THORACIC SURGERY (CARDIOTHORACIC VASCULAR SURGERY)

## 2018-05-20 PROCEDURE — 5A1221Z PERFORMANCE OF CARDIAC OUTPUT, CONTINUOUS: ICD-10-PCS | Performed by: THORACIC SURGERY (CARDIOTHORACIC VASCULAR SURGERY)

## 2018-05-20 PROCEDURE — 93005 ELECTROCARDIOGRAM TRACING: CPT | Performed by: PHYSICIAN ASSISTANT

## 2018-05-20 PROCEDURE — 25000000 HC PHARMACY GENERAL: Performed by: THORACIC SURGERY (CARDIOTHORACIC VASCULAR SURGERY)

## 2018-05-20 PROCEDURE — 83735 ASSAY OF MAGNESIUM: CPT | Performed by: THORACIC SURGERY (CARDIOTHORACIC VASCULAR SURGERY)

## 2018-05-20 PROCEDURE — 85384 FIBRINOGEN ACTIVITY: CPT | Performed by: THORACIC SURGERY (CARDIOTHORACIC VASCULAR SURGERY)

## 2018-05-20 PROCEDURE — 37000001 HC ANESTHESIA GENERAL: Performed by: THORACIC SURGERY (CARDIOTHORACIC VASCULAR SURGERY)

## 2018-05-20 PROCEDURE — 02100Z9 BYPASS CORONARY ARTERY, ONE ARTERY FROM LEFT INTERNAL MAMMARY, OPEN APPROACH: ICD-10-PCS | Performed by: THORACIC SURGERY (CARDIOTHORACIC VASCULAR SURGERY)

## 2018-05-20 PROCEDURE — 85610 PROTHROMBIN TIME: CPT | Performed by: THORACIC SURGERY (CARDIOTHORACIC VASCULAR SURGERY)

## 2018-05-20 PROCEDURE — 36000025 HC CELL SAVER PROCED

## 2018-05-20 PROCEDURE — 33519 CABG ARTERY-VEIN THREE: CPT | Performed by: THORACIC SURGERY (CARDIOTHORACIC VASCULAR SURGERY)

## 2018-05-20 PROCEDURE — 25800000 HC PHARMACY IV SOLUTIONS: Performed by: THORACIC SURGERY (CARDIOTHORACIC VASCULAR SURGERY)

## 2018-05-20 DEVICE — SCREW STERNAL DRILL-FREE  MAXDRIVE LOCKING 2.3MM X 17MM TI-6: Type: IMPLANTABLE DEVICE | Site: STERNUM | Status: FUNCTIONAL

## 2018-05-20 DEVICE — PLATE LOCK STERNAL 8-H T=1.8MM CP TITANIUM: Type: IMPLANTABLE DEVICE | Site: STERNUM | Status: FUNCTIONAL

## 2018-05-20 DEVICE — BONE HEMOSTASIS MATERIAL OSTENE: Type: IMPLANTABLE DEVICE | Status: FUNCTIONAL

## 2018-05-20 RX ORDER — CALCIUM CHLORIDE INJECTION 100 MG/ML
INJECTION, SOLUTION INTRAVENOUS AS NEEDED
Status: DISCONTINUED | OUTPATIENT
Start: 2018-05-20 | End: 2018-05-20 | Stop reason: SURG

## 2018-05-20 RX ORDER — HEPARIN SODIUM 1000 [USP'U]/ML
INJECTION, SOLUTION INTRAVENOUS; SUBCUTANEOUS AS NEEDED
Status: DISCONTINUED | OUTPATIENT
Start: 2018-05-20 | End: 2018-05-20 | Stop reason: HOSPADM

## 2018-05-20 RX ORDER — EPINEPHRINE HCL IN 0.9 % NACL 4MG/250ML
1-4 PLASTIC BAG, INJECTION (ML) INTRAVENOUS
Status: DISCONTINUED | OUTPATIENT
Start: 2018-05-20 | End: 2018-05-20

## 2018-05-20 RX ORDER — FAMOTIDINE 10 MG/ML
20 INJECTION INTRAVENOUS 2 TIMES DAILY
Status: DISCONTINUED | OUTPATIENT
Start: 2018-05-20 | End: 2018-05-23

## 2018-05-20 RX ORDER — DOBUTAMINE HYDROCHLORIDE 200 MG/100ML
.5-2 INJECTION INTRAVENOUS CONTINUOUS
Status: DISCONTINUED | OUTPATIENT
Start: 2018-05-20 | End: 2018-05-20

## 2018-05-20 RX ORDER — FUROSEMIDE 40 MG/1
40 TABLET ORAL DAILY
Status: DISCONTINUED | OUTPATIENT
Start: 2018-05-24 | End: 2018-05-23

## 2018-05-20 RX ORDER — ALBUMIN HUMAN 50 G/1000ML
SOLUTION INTRAVENOUS AS NEEDED
Status: DISCONTINUED | OUTPATIENT
Start: 2018-05-20 | End: 2018-05-20 | Stop reason: HOSPADM

## 2018-05-20 RX ORDER — ONDANSETRON HYDROCHLORIDE 2 MG/ML
INJECTION, SOLUTION INTRAVENOUS AS NEEDED
Status: DISCONTINUED | OUTPATIENT
Start: 2018-05-20 | End: 2018-05-20 | Stop reason: SURG

## 2018-05-20 RX ORDER — FUROSEMIDE 40 MG/1
40 TABLET ORAL
Status: DISPENSED | OUTPATIENT
Start: 2018-05-20 | End: 2018-05-23

## 2018-05-20 RX ORDER — HYDROMORPHONE HYDROCHLORIDE 2 MG/ML
.25-.5 INJECTION, SOLUTION INTRAMUSCULAR; INTRAVENOUS; SUBCUTANEOUS
Status: DISCONTINUED | OUTPATIENT
Start: 2018-05-20 | End: 2018-05-21

## 2018-05-20 RX ORDER — SODIUM CHLORIDE 9 MG/ML
5 INJECTION, SOLUTION INTRAVENOUS AS NEEDED
Status: ACTIVE | OUTPATIENT
Start: 2018-05-20 | End: 2018-05-21

## 2018-05-20 RX ORDER — METOPROLOL SUCCINATE 25 MG/1
25 TABLET, EXTENDED RELEASE ORAL NIGHTLY
Status: DISCONTINUED | OUTPATIENT
Start: 2018-05-20 | End: 2018-05-26 | Stop reason: HOSPADM

## 2018-05-20 RX ORDER — ALUMINUM HYDROXIDE, MAGNESIUM HYDROXIDE, AND SIMETHICONE 1200; 120; 1200 MG/30ML; MG/30ML; MG/30ML
30 SUSPENSION ORAL EVERY 4 HOURS PRN
Status: DISCONTINUED | OUTPATIENT
Start: 2018-05-20 | End: 2018-05-26 | Stop reason: HOSPADM

## 2018-05-20 RX ORDER — HYDROMORPHONE HYDROCHLORIDE 1 MG/ML
INJECTION, SOLUTION INTRAMUSCULAR; INTRAVENOUS; SUBCUTANEOUS
Status: COMPLETED
Start: 2018-05-20 | End: 2018-05-20

## 2018-05-20 RX ORDER — NOREPINEPHRINE BITARTRATE 0.02 MG/ML
INJECTION, SOLUTION INTRAVENOUS
Status: COMPLETED
Start: 2018-05-20 | End: 2018-05-20

## 2018-05-20 RX ORDER — DEXTROSE 50 % IN WATER (D50W) INTRAVENOUS SYRINGE
10-30 ONCE
Status: ACTIVE | OUTPATIENT
Start: 2018-05-20 | End: 2018-05-21

## 2018-05-20 RX ORDER — CHLORHEXIDINE GLUCONATE ORAL RINSE 1.2 MG/ML
15 SOLUTION DENTAL 2 TIMES DAILY
Status: DISCONTINUED | OUTPATIENT
Start: 2018-05-20 | End: 2018-05-26 | Stop reason: HOSPADM

## 2018-05-20 RX ORDER — MAGNESIUM SULFATE HEPTAHYDRATE 40 MG/ML
2 INJECTION, SOLUTION INTRAVENOUS
Status: DISPENSED | OUTPATIENT
Start: 2018-05-20 | End: 2018-05-21

## 2018-05-20 RX ORDER — HYDROMORPHONE HYDROCHLORIDE 2 MG/ML
INJECTION, SOLUTION INTRAMUSCULAR; INTRAVENOUS; SUBCUTANEOUS AS NEEDED
Status: DISCONTINUED | OUTPATIENT
Start: 2018-05-20 | End: 2018-05-20 | Stop reason: SURG

## 2018-05-20 RX ORDER — METOPROLOL SUCCINATE 50 MG/1
50 TABLET, EXTENDED RELEASE ORAL NIGHTLY
Status: DISCONTINUED | OUTPATIENT
Start: 2018-05-20 | End: 2018-05-26 | Stop reason: HOSPADM

## 2018-05-20 RX ORDER — MEPERIDINE HYDROCHLORIDE 50 MG/ML
12.5 INJECTION INTRAMUSCULAR; INTRAVENOUS; SUBCUTANEOUS AS NEEDED
Status: DISCONTINUED | OUTPATIENT
Start: 2018-05-20 | End: 2018-05-22

## 2018-05-20 RX ORDER — SODIUM BICARBONATE 1 MEQ/ML
SYRINGE (ML) INTRAVENOUS AS NEEDED
Status: DISCONTINUED | OUTPATIENT
Start: 2018-05-20 | End: 2018-05-20 | Stop reason: HOSPADM

## 2018-05-20 RX ORDER — ATORVASTATIN CALCIUM 80 MG/1
80 TABLET, FILM COATED ORAL NIGHTLY
Status: DISCONTINUED | OUTPATIENT
Start: 2018-05-20 | End: 2018-05-26 | Stop reason: HOSPADM

## 2018-05-20 RX ORDER — EPINEPHRINE HCL IN 0.9 % NACL 4MG/250ML
1 PLASTIC BAG, INJECTION (ML) INTRAVENOUS
Status: DISCONTINUED | OUTPATIENT
Start: 2018-05-20 | End: 2018-05-24

## 2018-05-20 RX ORDER — MILRINONE LACTATE 0.2 MG/ML
INJECTION, SOLUTION INTRAVENOUS CONTINUOUS PRN
Status: DISCONTINUED | OUTPATIENT
Start: 2018-05-20 | End: 2018-05-20 | Stop reason: SURG

## 2018-05-20 RX ORDER — CEFAZOLIN SODIUM/WATER 1 G/10 ML
2 SYRINGE (ML) INTRAVENOUS
Status: COMPLETED | OUTPATIENT
Start: 2018-05-20 | End: 2018-05-21

## 2018-05-20 RX ORDER — ONDANSETRON 4 MG/1
4 TABLET, ORALLY DISINTEGRATING ORAL EVERY 8 HOURS PRN
Status: DISCONTINUED | OUTPATIENT
Start: 2018-05-20 | End: 2018-05-26 | Stop reason: HOSPADM

## 2018-05-20 RX ORDER — MAGNESIUM SULFATE HEPTAHYDRATE 40 MG/ML
2 INJECTION, SOLUTION INTRAVENOUS AS NEEDED
Status: DISPENSED | OUTPATIENT
Start: 2018-05-20 | End: 2018-05-24

## 2018-05-20 RX ORDER — DOCUSATE SODIUM 100 MG/1
100 CAPSULE, LIQUID FILLED ORAL 2 TIMES DAILY
Status: DISCONTINUED | OUTPATIENT
Start: 2018-05-20 | End: 2018-05-26 | Stop reason: HOSPADM

## 2018-05-20 RX ORDER — NITROGLYCERIN 40 MG/100ML
5-20 INJECTION INTRAVENOUS CONTINUOUS
Status: DISCONTINUED | OUTPATIENT
Start: 2018-05-20 | End: 2018-05-20

## 2018-05-20 RX ORDER — DOBUTAMINE HYDROCHLORIDE 200 MG/100ML
2.5-2 INJECTION INTRAVENOUS
Status: DISCONTINUED | OUTPATIENT
Start: 2018-05-20 | End: 2018-05-20

## 2018-05-20 RX ORDER — MIDAZOLAM HYDROCHLORIDE 2 MG/2ML
INJECTION, SOLUTION INTRAMUSCULAR; INTRAVENOUS AS NEEDED
Status: DISCONTINUED | OUTPATIENT
Start: 2018-05-20 | End: 2018-05-20 | Stop reason: SURG

## 2018-05-20 RX ORDER — SENNOSIDES 8.6 MG/1
1 TABLET ORAL 2 TIMES DAILY
Status: DISCONTINUED | OUTPATIENT
Start: 2018-05-20 | End: 2018-05-26 | Stop reason: HOSPADM

## 2018-05-20 RX ORDER — PROTAMINE SULFATE 10 MG/ML
INJECTION, SOLUTION INTRAVENOUS AS NEEDED
Status: DISCONTINUED | OUTPATIENT
Start: 2018-05-20 | End: 2018-05-20 | Stop reason: SURG

## 2018-05-20 RX ORDER — MILRINONE LACTATE 0.2 MG/ML
0.25 INJECTION, SOLUTION INTRAVENOUS CONTINUOUS
Status: DISCONTINUED | OUTPATIENT
Start: 2018-05-20 | End: 2018-05-24

## 2018-05-20 RX ORDER — POTASSIUM CHLORIDE 14.9 MG/ML
INJECTION INTRAVENOUS
Status: DISPENSED
Start: 2018-05-20 | End: 2018-05-21

## 2018-05-20 RX ORDER — FAMOTIDINE 20 MG/1
20 TABLET, FILM COATED ORAL 2 TIMES DAILY
Status: DISCONTINUED | OUTPATIENT
Start: 2018-05-20 | End: 2018-05-26 | Stop reason: HOSPADM

## 2018-05-20 RX ORDER — FENTANYL CITRATE 50 UG/ML
INJECTION, SOLUTION INTRAMUSCULAR; INTRAVENOUS AS NEEDED
Status: DISCONTINUED | OUTPATIENT
Start: 2018-05-20 | End: 2018-05-20 | Stop reason: SURG

## 2018-05-20 RX ORDER — CALCIUM CHLORIDE INJECTION 100 MG/ML
INJECTION, SOLUTION INTRAVENOUS
Status: COMPLETED
Start: 2018-05-20 | End: 2018-05-20

## 2018-05-20 RX ORDER — TRANEXAMIC ACID 100 MG/ML
1000 INJECTION, SOLUTION INTRAVENOUS ONCE
Status: DISCONTINUED | OUTPATIENT
Start: 2018-05-20 | End: 2018-05-20

## 2018-05-20 RX ORDER — KETOROLAC TROMETHAMINE 30 MG/ML
30 INJECTION, SOLUTION INTRAMUSCULAR; INTRAVENOUS ONCE
Status: COMPLETED | OUTPATIENT
Start: 2018-05-21 | End: 2018-05-21

## 2018-05-20 RX ORDER — LIDOCAINE HYDROCHLORIDE 10 MG/ML
INJECTION, SOLUTION INFILTRATION; PERINEURAL AS NEEDED
Status: DISCONTINUED | OUTPATIENT
Start: 2018-05-20 | End: 2018-05-20 | Stop reason: HOSPADM

## 2018-05-20 RX ORDER — ONDANSETRON HYDROCHLORIDE 2 MG/ML
4 INJECTION, SOLUTION INTRAVENOUS EVERY 8 HOURS PRN
Status: DISCONTINUED | OUTPATIENT
Start: 2018-05-20 | End: 2018-05-26 | Stop reason: HOSPADM

## 2018-05-20 RX ORDER — LANOLIN ALCOHOL/MO/W.PET/CERES
400 CREAM (GRAM) TOPICAL 2 TIMES DAILY
Status: DISCONTINUED | OUTPATIENT
Start: 2018-05-20 | End: 2018-05-26 | Stop reason: HOSPADM

## 2018-05-20 RX ORDER — ALBUMIN HUMAN 50 G/1000ML
500 SOLUTION INTRAVENOUS AS NEEDED
Status: DISCONTINUED | OUTPATIENT
Start: 2018-05-20 | End: 2018-05-24

## 2018-05-20 RX ORDER — BISACODYL 10 MG/1
10 SUPPOSITORY RECTAL AS NEEDED
Status: DISPENSED | OUTPATIENT
Start: 2018-05-23 | End: 2018-05-24

## 2018-05-20 RX ORDER — EPINEPHRINE HCL IN 0.9 % NACL 4MG/250ML
1-10 PLASTIC BAG, INJECTION (ML) INTRAVENOUS
Status: DISCONTINUED | OUTPATIENT
Start: 2018-05-20 | End: 2018-05-20

## 2018-05-20 RX ORDER — POTASSIUM CHLORIDE 14.9 MG/ML
20 INJECTION INTRAVENOUS EVERY 8 HOURS PRN
Status: DISCONTINUED | OUTPATIENT
Start: 2018-05-20 | End: 2018-05-24

## 2018-05-20 RX ORDER — POTASSIUM CHLORIDE 750 MG/1
20 TABLET, FILM COATED, EXTENDED RELEASE ORAL 2 TIMES DAILY
Status: DISCONTINUED | OUTPATIENT
Start: 2018-05-20 | End: 2018-05-21

## 2018-05-20 RX ORDER — OXYCODONE HYDROCHLORIDE 5 MG/1
5-10 TABLET ORAL EVERY 4 HOURS PRN
Status: DISCONTINUED | OUTPATIENT
Start: 2018-05-20 | End: 2018-05-21

## 2018-05-20 RX ORDER — ACETAMINOPHEN 325 MG/1
650 TABLET ORAL EVERY 4 HOURS PRN
Status: DISCONTINUED | OUTPATIENT
Start: 2018-05-20 | End: 2018-05-26 | Stop reason: HOSPADM

## 2018-05-20 RX ORDER — MAGNESIUM SULFATE HEPTAHYDRATE 40 MG/ML
2 INJECTION, SOLUTION INTRAVENOUS DAILY
Status: ACTIVE | OUTPATIENT
Start: 2018-05-21 | End: 2018-05-23

## 2018-05-20 RX ORDER — PROPOFOL 10 MG/ML
INJECTION, EMULSION INTRAVENOUS AS NEEDED
Status: DISCONTINUED | OUTPATIENT
Start: 2018-05-20 | End: 2018-05-20 | Stop reason: SURG

## 2018-05-20 RX ORDER — DEXMEDETOMIDINE HYDROCHLORIDE 4 UG/ML
.2-1.5 INJECTION, SOLUTION INTRAVENOUS
Status: DISCONTINUED | OUTPATIENT
Start: 2018-05-20 | End: 2018-05-20

## 2018-05-20 RX ORDER — ROCURONIUM BROMIDE 10 MG/ML
INJECTION, SOLUTION INTRAVENOUS AS NEEDED
Status: DISCONTINUED | OUTPATIENT
Start: 2018-05-20 | End: 2018-05-20 | Stop reason: SURG

## 2018-05-20 RX ADMIN — VASOPRESSIN 0.04 UNITS/MIN: 20 INJECTION INTRAVENOUS at 09:42

## 2018-05-20 RX ADMIN — Medication 1 MCG/MIN: at 17:15

## 2018-05-20 RX ADMIN — VASOPRESSIN 0.06 UNITS/MIN: 20 INJECTION INTRAVENOUS at 17:57

## 2018-05-20 RX ADMIN — Medication 2 G: at 07:37

## 2018-05-20 RX ADMIN — HYDROMORPHONE HYDROCHLORIDE 2 MG: 2 INJECTION, SOLUTION INTRAMUSCULAR; INTRAVENOUS; SUBCUTANEOUS at 09:09

## 2018-05-20 RX ADMIN — FAMOTIDINE 20 MG: 10 INJECTION INTRAVENOUS at 20:23

## 2018-05-20 RX ADMIN — SODIUM CHLORIDE 3.99 UNITS/HR: 9 INJECTION, SOLUTION INTRAVENOUS at 22:22

## 2018-05-20 RX ADMIN — POTASSIUM CHLORIDE 20 MEQ: 200 INJECTION, SOLUTION INTRAVENOUS at 14:09

## 2018-05-20 RX ADMIN — MIDAZOLAM HYDROCHLORIDE 1 MG: 1 INJECTION, SOLUTION INTRAMUSCULAR; INTRAVENOUS at 06:56

## 2018-05-20 RX ADMIN — Medication 1 G: at 10:31

## 2018-05-20 RX ADMIN — INSULIN HUMAN 4 UNITS: 100 INJECTION, SOLUTION PARENTERAL at 12:18

## 2018-05-20 RX ADMIN — MAGNESIUM SULFATE HEPTAHYDRATE 2 G: 40 INJECTION, SOLUTION INTRAVENOUS at 20:23

## 2018-05-20 RX ADMIN — ALBUMIN (HUMAN) 50 ML: 12.5 INJECTION, SOLUTION INTRAVENOUS at 10:14

## 2018-05-20 RX ADMIN — SODIUM BICARBONATE 50 MEQ: 84 INJECTION, SOLUTION INTRAVENOUS at 10:16

## 2018-05-20 RX ADMIN — ROCURONIUM BROMIDE 50 MG: 10 INJECTION, SOLUTION INTRAVENOUS at 10:45

## 2018-05-20 RX ADMIN — PROTAMINE SULFATE 300 MG: 10 INJECTION, SOLUTION INTRAVENOUS at 12:02

## 2018-05-20 RX ADMIN — INSULIN HUMAN 4 UNITS: 100 INJECTION, SOLUTION PARENTERAL at 11:40

## 2018-05-20 RX ADMIN — NOREPINEPHRINE BITARTRATE 8 MG: at 20:20

## 2018-05-20 RX ADMIN — INSULIN HUMAN 4 UNITS/HR: 100 INJECTION, SOLUTION PARENTERAL at 11:42

## 2018-05-20 RX ADMIN — Medication 2 G: at 16:29

## 2018-05-20 RX ADMIN — CALCIUM CHLORIDE 0.1 G: 100 INJECTION, SOLUTION INTRAVENOUS; INTRAVENTRICULAR at 16:33

## 2018-05-20 RX ADMIN — VANCOMYCIN HYDROCHLORIDE 1 G: 1 INJECTION, POWDER, LYOPHILIZED, FOR SOLUTION INTRAVENOUS at 06:55

## 2018-05-20 RX ADMIN — AMIODARONE HYDROCHLORIDE 1 MG/MIN: 1.8 INJECTION, SOLUTION INTRAVENOUS at 20:15

## 2018-05-20 RX ADMIN — CHLORHEXIDINE GLUCONATE 15 ML: 1.2 RINSE ORAL at 20:19

## 2018-05-20 RX ADMIN — SODIUM CHLORIDE 3.87 UNITS/HR: 9 INJECTION, SOLUTION INTRAVENOUS at 20:05

## 2018-05-20 RX ADMIN — ROCURONIUM BROMIDE 50 MG: 10 INJECTION, SOLUTION INTRAVENOUS at 09:54

## 2018-05-20 RX ADMIN — PROPOFOL 50 MG: 10 INJECTION, EMULSION INTRAVENOUS at 07:16

## 2018-05-20 RX ADMIN — SODIUM CHLORIDE 8 MCG/MIN: 900 INJECTION, SOLUTION INTRAVENOUS at 20:02

## 2018-05-20 RX ADMIN — HYDROMORPHONE HYDROCHLORIDE 1 MG: 2 INJECTION, SOLUTION INTRAMUSCULAR; INTRAVENOUS; SUBCUTANEOUS at 10:35

## 2018-05-20 RX ADMIN — Medication 1 G: at 13:30

## 2018-05-20 RX ADMIN — MIDAZOLAM HYDROCHLORIDE 1 MG: 1 INJECTION, SOLUTION INTRAMUSCULAR; INTRAVENOUS at 07:16

## 2018-05-20 RX ADMIN — FENTANYL CITRATE 250 MCG: 50 INJECTION, SOLUTION INTRAMUSCULAR; INTRAVENOUS at 07:18

## 2018-05-20 RX ADMIN — ONDANSETRON 4 MG: 2 INJECTION INTRAMUSCULAR; INTRAVENOUS at 12:50

## 2018-05-20 RX ADMIN — AMIODARONE HYDROCHLORIDE 150 MG: 50 INJECTION, SOLUTION INTRAVENOUS at 19:45

## 2018-05-20 RX ADMIN — HEPARIN SODIUM 1600 UNITS/HR: 10000 INJECTION, SOLUTION INTRAVENOUS at 00:20

## 2018-05-20 RX ADMIN — HEPARIN SODIUM 10000 UNITS: 1000 INJECTION, SOLUTION INTRAVENOUS; SUBCUTANEOUS at 10:14

## 2018-05-20 RX ADMIN — SODIUM BICARBONATE 50 MEQ: 84 INJECTION, SOLUTION INTRAVENOUS at 09:54

## 2018-05-20 RX ADMIN — HYDROMORPHONE HYDROCHLORIDE 0.5 MG: 2 INJECTION, SOLUTION INTRAMUSCULAR; INTRAVENOUS; SUBCUTANEOUS at 20:29

## 2018-05-20 RX ADMIN — Medication: at 09:57

## 2018-05-20 RX ADMIN — MILRINONE LACTATE 0.38 MCG/KG/MIN: 200 INJECTION, SOLUTION INTRAVENOUS at 11:01

## 2018-05-20 RX ADMIN — LIDOCAINE HYDROCHLORIDE 100 MG: 10 INJECTION, SOLUTION INFILTRATION; PERINEURAL at 11:07

## 2018-05-20 RX ADMIN — HYDROMORPHONE HYDROCHLORIDE 0.5 MG: 1 INJECTION, SOLUTION INTRAMUSCULAR; INTRAVENOUS; SUBCUTANEOUS at 23:00

## 2018-05-20 RX ADMIN — SODIUM BICARBONATE 50 MEQ: 84 INJECTION, SOLUTION INTRAVENOUS at 11:00

## 2018-05-20 RX ADMIN — FENTANYL CITRATE 250 MCG: 50 INJECTION, SOLUTION INTRAMUSCULAR; INTRAVENOUS at 08:03

## 2018-05-20 RX ADMIN — CALCIUM CHLORIDE 0.5 G: 100 INJECTION, SOLUTION INTRAVENOUS at 12:16

## 2018-05-20 RX ADMIN — HYDROMORPHONE HYDROCHLORIDE 0.5 MG: 1 INJECTION, SOLUTION INTRAMUSCULAR; INTRAVENOUS; SUBCUTANEOUS at 22:25

## 2018-05-20 RX ADMIN — ROCURONIUM BROMIDE 100 MG: 10 INJECTION, SOLUTION INTRAVENOUS at 07:18

## 2018-05-20 NOTE — PLAN OF CARE
Problem: Patient Care Overview  Goal: Plan of Care Review  Outcome: Ongoing (interventions implemented as appropriate)    Goal: Individualization & Mutuality  Outcome: Ongoing (interventions implemented as appropriate)    Goal: Discharge Needs Assessment  Outcome: Ongoing (interventions implemented as appropriate)      Problem: Cardiac Surgery (Adult)  Goal: Signs and Symptoms of Listed Potential Problems Will be Absent, Minimized or Managed (Cardiac Surgery)  Outcome: Ongoing (interventions implemented as appropriate)    Goal: Anesthesia/Sedation Recovery  Outcome: Ongoing (interventions implemented as appropriate)      Problem: Pressure Ulcer Risk (Edison Scale) (Adult,Obstetrics,Pediatric)  Goal: Identify Related Risk Factors and Signs and Symptoms  Outcome: Ongoing (interventions implemented as appropriate)    Goal: Skin Integrity  Outcome: Ongoing (interventions implemented as appropriate)      Problem: Ventilation, Mechanical Invasive (Adult)  Goal: Signs and Symptoms of Listed Potential Problems Will be Absent, Minimized or Managed (Ventilation, Mechanical Invasive)  Outcome: Ongoing (interventions implemented as appropriate)

## 2018-05-20 NOTE — ANESTHESIA PROCEDURE NOTES
Procedure Performed: MADELINE      Start Time:  5/20/2018 8:30 AM       End Time:      Preanesthesia Checklist:  Patient identified, IV assessed, risks and benefits discussed, monitors and equipment assessed, procedure being performed at surgeon's request and anesthesia consent obtained.    General Procedure Information  Physician Requesting Echo: JIL EDGE  Location performed:  OR  Intubated  Bite block placed  Heart visualized  Probe Insertion:  Easy  Probe Type:  Transesophageal  Modalities:  2D, 3D, continuous wave Doppler and pulse wave Doppler        Anesthesia Information  Performed Personally  Anesthesiologist:  TIFFANIE GRACE    Surgeon:  JIL EDGE    Echocardiogram Comments:       MADELINE for CABG    Pre CABG  · Low normal systolic function. Estimated EF = 45%.  Hypokinesis of the lateral and inferolateral wall.  · Calcified aortic valve slight motion abnormlity of right coronary cusp, mean gradient of 6 mmHg, no AI  · Normal-sized right ventricular cavity with preserved systolic function.  · Trace tricuspid valve regurgitation.  · Mild mitral valve regurgitation. Sclerotic mitral valve.  · Mildly dilated left atrium.  · Normal RV function  · Grade II atherosclerotic disease in ascending and descending throacic aorta.  Plaques measuring up to 0.3 cm, no mobile components    S/P CABG  Pre CABG  · Low normal systolic function. Estimated EF = 55%.    · Calcified aortic valve slight motion abnormlity of right coronary cusp, mean gradient of 6 mmHg, no AI  · Normal-sized right ventricular cavity with preserved systolic function.  · Trace tricuspid valve regurgitation.  · Mild mitral valve regurgitation. Sclerotic mitral valve.  · Mildly dilated left atrium.  · Normal RV function  · Grade II atherosclerotic disease in ascending and descending throacic aorta.  Plaques measuring up to 0.3 cm, no mobile components    All findings discussed with surgical team in real time

## 2018-05-20 NOTE — ANESTHESIA PROCEDURE NOTES
Airway  Urgency: emergent    Start Time: 5/20/2018 7:20 AM    General Information and Staff    Patient location during procedure: OR  Anesthesiologist: TIFFANIE GRACE  Performed: anesthesiologist     Consent for Airway (if performed for an anesthetic, see related documentation for consents)  Patient identity confirmed: verbally with patient, arm band and hospital-assigned identification number  Consent: The procedure was performed in an emergent situation. Written consent obtained.  Consent given by: patient      Indications and Patient Condition  Indications for airway management: anesthesia  Sedation level: deep  Preoxygenated: yes  Patient position: sniffing      Final Airway Details  Final airway type: endotracheal airway      Successful airway: ETT  Cuffed: yes   Successful intubation technique: video laryngoscopy  Facilitating devices/methods: intubating stylet and cricoid pressure  Endotracheal tube insertion site: oral  Blade: Coleen  Blade size: #3  ETT size: 8.0 mm  Cormack-Lehane Classification: grade I - full view of glottis  Placement verified by: chest auscultation and capnometry   Measured from: lips  Number of attempts at approach: 1  Atraumatic airway insertion

## 2018-05-20 NOTE — ANESTHESIA PROCEDURE NOTES
Central Venous Line:      A central venous line was placed in the OR for the following indication(s): central venous access and CVP monitoring.    Sterility preparation included the following: provider hand hygiene performed prior to central venous catheter insertion and all 5 sterile barriers used (gloves, gown, cap, mask, large sterile drape) during central venous catheter insertion.      The patient was placed in Trendelenburg position. Right internal jugular vein was prepped.    The site was prepped with Chlorhexidine.  A 9 Fr (size), introducer     During the procedure, the following specific steps were taken: target vein identified, needle advanced into vein and blood aspirated and guidewire advanced into vein.  Seldinger technique used        Procedure performed using ultrasound guidance and surface landmarks.    Sterile gel and probe cover used in ultrasound-guided central venous catheter insertion.    Image captured and stored.      The PAC placement was confirmed by pressure tracing changes and MADELINE      Intravenous verification was obtained by ultrasound and venous blood return.      Post insertion care included: all ports aspirated, all ports flushed easily, guidewire removed intact, Biopatch applied, line sutured in place and dressing applied.    During the procedure the patient experienced: patient tolerated procedure well with no complications.        Staffing  Anesthesiologist: TIFFANIE GRACE  Performed: anesthesiologist

## 2018-05-20 NOTE — BRIEF OP NOTE
CABG, POSS LIMA, RAMA, RAD ARTERY, EVH, MADELINE Procedure Note    Procedure:    CABG, POSS LIMA, RAMA, RAD ARTERY, EVH, MADELINE  CPT(R) Code:  46627 - [place] CABG, ARTERIAL, THREE      Pre-op Diagnosis     * Chest pain [R07.9]     * NSTEMI (non-ST elevated myocardial infarction) (CMS/HCC) (HCC) [I21.4]       Post-op Diagnosis     * Chest pain [R07.9]     * NSTEMI (non-ST elevated myocardial infarction) (CMS/HCC) (HCC) [I21.4]    Surgeon(s) and Role:     * Larisa Slater MD - Primary    Anesthesia: General    Staff:   Circulator: Sheila Zazueta RN  Physician Assistant: SLY Enriquez ([facility])  Scrub Person: Anna Salmon    Procedure Details   CABG x 4 with LIMA- LAD, SVG - Diag, SVG - Om1, SVG - Om2. Patient received 3 U RBC and 4 U FFP intraoperatively. Tx to CTICU on milrinone, monae and vaso, insulin and precedex     Estimated Blood Loss: No blood loss documented.    Specimens:                  Order Name Source Comment Collection Info Order Time   CBC Blood, Arterial   Pre-op diagnosis:Chest pain [R07.9]NSTEMI (non-ST elevated myocardial infarction) (CMS/HCC) (HCC) [I21.4] Collected By: Larisa Slater MD 5/20/2018  7:23 AM   ANTITHROMBIN III ACTIVITY Blood, Arterial   Pre-op diagnosis:Chest pain [R07.9]NSTEMI (non-ST elevated myocardial infarction) (CMS/HCC) (HCC) [I21.4] Collected By: Larisa Slater MD 5/20/2018  7:23 AM   FIBRINOGEN Blood, Arterial   Pre-op diagnosis:Chest pain [R07.9]NSTEMI (non-ST elevated myocardial infarction) (CMS/HCC) (HCC) [I21.4] Collected By: Larisa Slater MD 5/20/2018  7:23 AM   CBC Blood, Arterial   Pre-op diagnosis:Chest pain [R07.9]NSTEMI (non-ST elevated myocardial infarction) (CMS/HCC) (HCC) [I21.4] Collected By: Larisa Slater MD 5/20/2018 11:15 AM   FIBRINOGEN Blood, Arterial   Pre-op diagnosis:Chest pain [R07.9]NSTEMI (non-ST elevated myocardial infarction) (CMS/HCC) (HCC) [I21.4] Collected By: Larisa Slater MD 5/20/2018 11:15 AM    CBC Blood, Arterial   Pre-op diagnosis:Chest pain [R07.9]NSTEMI (non-ST elevated myocardial infarction) (CMS/HCC) (HCC) [I21.4] Collected By: Larisa Slater MD 5/20/2018 12:10 PM   FIBRINOGEN Blood, Arterial   Pre-op diagnosis:Chest pain [R07.9]NSTEMI (non-ST elevated myocardial infarction) (CMS/HCC) (HCC) [I21.4] Collected By: Larisa Slater MD 5/20/2018 12:10 PM   PROTIME-INR Blood, Arterial   Pre-op diagnosis:Chest pain [R07.9]NSTEMI (non-ST elevated myocardial infarction) (CMS/HCC) (HCC) [I21.4] Collected By: Larisa Slater MD 5/20/2018 12:10 PM   PTT Blood, Arterial   Pre-op diagnosis:Chest pain [R07.9]NSTEMI (non-ST elevated myocardial infarction) (CMS/HCC) (HCC) [I21.4] Collected By: Larisa Slater MD 5/20/2018 12:10 PM   CBC Blood, Venous   5/20/2018 12:10 PM   PTT Blood, Venous   5/20/2018 12:10 PM   PROTIME-INR Blood, Venous   5/20/2018 12:10 PM   FIBRINOGEN Blood, Venous   5/20/2018 12:10 PM   PREPARE RBC    Pre-op for Procedure  5/19/2018  1:07 PM    Transfusion indications  Pre-OP major surgery with bleeding risk       Has consent been obtained?  Yes      PREPARE SINGLE DONOR PLATELETS    PreOp for Procedure  5/19/2018  1:07 PM    Transfusion indications  Pre-Op, major surgery with bleeding risk       Has consent been obtained?  Yes      PREPARE RBC    5/20/2018 11:00 AM    Transfusion indications  Pre-OP major surgery with bleeding risk       Has consent been obtained?  Yes      PREPARE FROZEN PLASMA Blood, Venous   5/20/2018 11:04 AM    Transfusion indications  Other (Specify)       Explanatory Comment:  cardiac surtery       Has consent been obtained?  Yes            Drains:      Implants:   Implant Name Type Inv. Item Serial No.  Lot No. LRB No. Used             PLATE LOCK STERNAL 8-H T=1.8MM CP TITANIUM - [id number] Bone plate PLATE LOCK STERNAL 8-H T=1.8MM CP TITANIUM  KLS CANDY  N/A 1   SCREW STERNAL DRILL-FREE  MAXDRIVE LOCKING 2.3MM X 17MM TI-6 -  [id number] Bone screw SCREW STERNAL DRILL-FREE  MAXDRIVE LOCKING 2.3MM X 17MM TI-6   KLS CANDY   N/A 8              Complications:  None; patient tolerated the procedure well.           Disposition: ICU - intubated and hemodynamically stable.           Condition: stable    Larisa Slater MD  Phone Number: [phone]

## 2018-05-20 NOTE — ANESTHESIA PROCEDURE NOTES
Arterial Line:    Start time: 5/20/2018 6:59 AM    An arterial line was placed in the OR for the following indication(s): continuous blood pressure monitoring and blood sampling needed.    A 20 G (size), 1 and 3/4 inch (length), Angiocath (type) catheter was placed,   Seldinger technique used  , into the Left radial artery, secured by Tegaderm.      Procedure performed using ultrasound guidance and surface landmarks.    Image captured and stored.      Events:  patient tolerated procedure well with no complications.    The supervising anesthesiologist was   Attending: TIFFANIE GRACE

## 2018-05-20 NOTE — ANESTHESIA POSTPROCEDURE EVALUATION
Patient: Maximus Daniel    Procedure Summary     Date:  05/20/18 Room / Location:  [facility] OR 4 / [facility] OR    Anesthesia Start:  0656 Anesthesia Stop:  1354    Procedure:  CABG, POSS LIMA, RAMA, RAD ARTERY, EVH, MADELINE (N/A ) Diagnosis:       Chest pain      NSTEMI (non-ST elevated myocardial infarction) (CMS/HCC) (HCC)      (Chest pain [R07.9])      (NSTEMI (non-ST elevated myocardial infarction) (CMS/HCC) (HCC) [I21.4])    Surgeon:  Larisa Slater MD Responsible Provider:  Blayne Rajan MD    Anesthesia Type:  general ASA Status:  4          Anesthesia Type: general  PACU Vitals     No data found.            Anesthesia Post Evaluation    Pain management: satisfactory to patient  Mode of pain management: IV medication  Patient location during evaluation: ICU  Patient participation: complete - patient cannot participate  Level of consciousness: arousable  Cardiovascular status: acceptable and hemodynamically stable  Airway Patency: adequate  Respiratory status: acceptable, ETT and ventilator  Anesthetic complications: no

## 2018-05-21 ENCOUNTER — APPOINTMENT (INPATIENT)
Dept: RADIOLOGY | Facility: HOSPITAL | Age: 78
DRG: 234 | End: 2018-05-21
Attending: THORACIC SURGERY (CARDIOTHORACIC VASCULAR SURGERY)
Payer: MEDICARE

## 2018-05-21 ENCOUNTER — APPOINTMENT (INPATIENT)
Dept: RADIOLOGY | Facility: HOSPITAL | Age: 78
DRG: 234 | End: 2018-05-21
Attending: PHYSICIAN ASSISTANT
Payer: MEDICARE

## 2018-05-21 ENCOUNTER — APPOINTMENT (INPATIENT)
Dept: PHYSICAL THERAPY | Facility: HOSPITAL | Age: 78
DRG: 234 | End: 2018-05-21
Attending: PHYSICIAN ASSISTANT
Payer: MEDICARE

## 2018-05-21 LAB
ANION GAP SERPL CALC-SCNC: 9 MEQ/L (ref 3–15)
ATRIAL RATE: 87
BASE EXCESS BLDA CALC-SCNC: -2.8 MMOL/L
BASE EXCESS BLDA CALC-SCNC: -3.9 MMOL/L
BASE EXCESS BLDA CALC-SCNC: -4.8 MMOL/L
BASE EXCESS BLDA CALC-SCNC: -5.7 MMOL/L
BASE EXCESS BLDA CALC-SCNC: 0.1 MMOL/L
BUN SERPL-MCNC: 28 MG/DL (ref 8–20)
CA-I BLD-SCNC: 1.11 MMOL/L (ref 1.15–1.27)
CA-I BLD-SCNC: 1.12 MMOL/L (ref 1.15–1.27)
CA-I BLD-SCNC: 1.17 MMOL/L (ref 1.15–1.27)
CA-I BLD-SCNC: 1.19 MMOL/L (ref 1.15–1.27)
CA-I BLD-SCNC: 1.19 MMOL/L (ref 1.15–1.27)
CALCIUM SERPL-MCNC: 8.7 MG/DL (ref 8.9–10.3)
CHLORIDE SERPL-SCNC: 107 MMOL/L (ref 98–109)
CO2 BLDA-SCNC: 20 MMOL/L (ref 22–32)
CO2 BLDA-SCNC: 22 MMOL/L (ref 22–32)
CO2 BLDA-SCNC: 22 MMOL/L (ref 22–32)
CO2 BLDA-SCNC: 23 MMOL/L (ref 22–32)
CO2 BLDA-SCNC: 26 MMOL/L (ref 22–32)
CO2 SERPL-SCNC: 23 MMOL/L (ref 22–32)
CREAT SERPL-MCNC: 1.4 MG/DL (ref 0.8–1.3)
ERYTHROCYTE [DISTWIDTH] IN BLOOD BY AUTOMATED COUNT: 15.5 % (ref 11.6–14.4)
ERYTHROCYTE [DISTWIDTH] IN BLOOD BY AUTOMATED COUNT: 15.9 % (ref 11.6–14.4)
GFR SERPL CREATININE-BSD FRML MDRD: 48.9 ML/MIN/1.73M*2
GLUCOSE BLD-MCNC: 112 MG/DL (ref 70–99)
GLUCOSE BLD-MCNC: 152 MG/DL (ref 70–99)
GLUCOSE BLD-MCNC: 165 MG/DL (ref 70–99)
GLUCOSE BLD-MCNC: 244 MG/DL (ref 70–99)
GLUCOSE BLD-MCNC: 264 MG/DL (ref 70–99)
GLUCOSE BLD-MCNC: 79 MG/DL (ref 70–99)
GLUCOSE BLDA-MCNC: 152 MG/DL (ref 65–95)
GLUCOSE BLDA-MCNC: 215 MG/DL (ref 65–95)
GLUCOSE BLDA-MCNC: 231 MG/DL (ref 65–95)
GLUCOSE BLDA-MCNC: 241 MG/DL (ref 65–95)
GLUCOSE BLDA-MCNC: 247 MG/DL (ref 65–95)
GLUCOSE SERPL-MCNC: 153 MG/DL (ref 70–99)
HCO3 BLDA-SCNC: 19 MMOL/L (ref 21–28)
HCO3 BLDA-SCNC: 21 MMOL/L (ref 21–28)
HCO3 BLDA-SCNC: 21 MMOL/L (ref 21–28)
HCO3 BLDA-SCNC: 22 MMOL/L (ref 21–28)
HCO3 BLDA-SCNC: 25 MMOL/L (ref 21–28)
HCT VFR BLDA CALC: 31 % (ref 36–48)
HCT VFR BLDA CALC: 32 % (ref 36–48)
HCT VFR BLDA CALC: 32 % (ref 36–48)
HCT VFR BLDA CALC: 33 % (ref 36–48)
HCT VFR BLDA CALC: 34 % (ref 36–48)
HCT VFR BLDCO AUTO: 29.4 % (ref 40–51)
HCT VFR BLDCO AUTO: 29.7 % (ref 40–51)
HGB BLD-MCNC: 9.3 G/DL (ref 13.7–17.5)
HGB BLD-MCNC: 9.6 G/DL (ref 13.7–17.5)
LACTATE BLDA-SCNC: 1.2 MMOL/L (ref 0.4–1.6)
LACTATE BLDA-SCNC: 1.9 MMOL/L (ref 0.4–1.6)
LACTATE BLDA-SCNC: 2 MMOL/L (ref 0.4–1.6)
LACTATE BLDA-SCNC: 2 MMOL/L (ref 0.4–1.6)
LACTATE BLDA-SCNC: 2.1 MMOL/L (ref 0.4–1.6)
MAGNESIUM SERPL-MCNC: 2.1 MG/DL (ref 1.8–2.5)
MAGNESIUM SERPL-MCNC: 2.4 MG/DL (ref 1.8–2.5)
MCH RBC QN AUTO: 26.7 PG (ref 28–33.2)
MCH RBC QN AUTO: 26.8 PG (ref 28–33.2)
MCHC RBC AUTO-ENTMCNC: 31.6 G/DL (ref 32.2–36.5)
MCHC RBC AUTO-ENTMCNC: 32.3 G/DL (ref 32.2–36.5)
MCV RBC AUTO: 82.7 FL (ref 83–98)
MCV RBC AUTO: 84.7 FL (ref 83–98)
P AXIS: 51
PCO2 BLDA: 35 MMHG (ref 35–48)
PCO2 BLDA: 35 MMHG (ref 35–48)
PCO2 BLDA: 36 MMHG (ref 35–48)
PCO2 BLDA: 38 MMHG (ref 35–48)
PCO2 BLDA: 39 MMHG (ref 35–48)
PDW BLD AUTO: 10.8 FL (ref 9.4–12.4)
PDW BLD AUTO: 9.3 FL (ref 9.4–12.4)
PH BLDA: 7.34 PH (ref 7.35–7.45)
PH BLDA: 7.35 PH (ref 7.35–7.45)
PH BLDA: 7.38 PH (ref 7.35–7.45)
PH BLDA: 7.39 PH (ref 7.35–7.45)
PH BLDA: 7.41 PH (ref 7.35–7.45)
PHOSPHATE SERPL-MCNC: 3.5 MG/DL (ref 2.4–4.7)
PLATELET # BLD AUTO: 202 K/UL (ref 150–350)
PLATELET # BLD AUTO: 203 K/UL (ref 150–350)
PO2 BLDA: 71 MMHG (ref 60–70)
PO2 BLDA: 86 MMHG (ref 60–70)
POCT PATIENT TEMPERATURE: 98.6 F (ref 97–99)
POTASSIUM BLDA-SCNC: 3.8 MEQ/L (ref 3.4–4.5)
POTASSIUM BLDA-SCNC: 4.2 MEQ/L (ref 3.4–4.5)
POTASSIUM BLDA-SCNC: 4.2 MEQ/L (ref 3.4–4.5)
POTASSIUM BLDA-SCNC: 4.4 MEQ/L (ref 3.4–4.5)
POTASSIUM BLDA-SCNC: 4.4 MEQ/L (ref 3.4–4.5)
POTASSIUM SERPL-SCNC: 3.9 MEQ/L (ref 3.6–5.1)
POTASSIUM SERPL-SCNC: 3.9 MMOL/L (ref 3.6–5.1)
POTASSIUM SERPL-SCNC: 4.5 MMOL/L (ref 3.6–5.1)
PR INTERVAL: 208
QRS DURATION: 80
QT INTERVAL: 402
QTC CALCULATION(BAZETT): 483
R AXIS: 69
RBC # BLD AUTO: 3.47 M/UL (ref 4.5–5.8)
RBC # BLD AUTO: 3.59 M/UL (ref 4.5–5.8)
SAO2 % BLDA: 93 % (ref 93–98)
SAO2 % BLDA: 93 % (ref 93–98)
SAO2 % BLDA: 94 % (ref 93–98)
SAO2 % BLDA: 94 % (ref 93–98)
SAO2 % BLDA: 96 % (ref 93–98)
SAO2 % BLDV: 51.1 % (ref 30–60)
SAO2 % BLDV: 57.5 % (ref 30–60)
SODIUM BLDA-SCNC: 134 MEQ/L (ref 136–145)
SODIUM BLDA-SCNC: 134 MEQ/L (ref 136–145)
SODIUM BLDA-SCNC: 135 MEQ/L (ref 136–145)
SODIUM BLDA-SCNC: 136 MEQ/L (ref 136–145)
SODIUM BLDA-SCNC: 138 MEQ/L (ref 136–145)
SODIUM SERPL-SCNC: 139 MMOL/L (ref 136–144)
T WAVE AXIS: 44
VENTRICULAR RATE: 87
WBC # BLD AUTO: 6.23 K/UL (ref 3.8–10.5)
WBC # BLD AUTO: 7.05 K/UL (ref 3.8–10.5)

## 2018-05-21 PROCEDURE — 82810 BLOOD GASES O2 SAT ONLY: CPT | Performed by: THORACIC SURGERY (CARDIOTHORACIC VASCULAR SURGERY)

## 2018-05-21 PROCEDURE — 63700000 HC SELF-ADMINISTRABLE DRUG: Performed by: STUDENT IN AN ORGANIZED HEALTH CARE EDUCATION/TRAINING PROGRAM

## 2018-05-21 PROCEDURE — 63600000 HC DRUGS/DETAIL CODE: Performed by: INTERNAL MEDICINE

## 2018-05-21 PROCEDURE — 99291 CRITICAL CARE FIRST HOUR: CPT | Performed by: ANESTHESIOLOGY

## 2018-05-21 PROCEDURE — 20000000 HC ROOM AND CARE ICU

## 2018-05-21 PROCEDURE — 84100 ASSAY OF PHOSPHORUS: CPT | Performed by: PHYSICIAN ASSISTANT

## 2018-05-21 PROCEDURE — 63600000 HC DRUGS/DETAIL CODE: Performed by: PHYSICIAN ASSISTANT

## 2018-05-21 PROCEDURE — 94640 AIRWAY INHALATION TREATMENT: CPT

## 2018-05-21 PROCEDURE — 99233 SBSQ HOSP IP/OBS HIGH 50: CPT | Performed by: INTERNAL MEDICINE

## 2018-05-21 PROCEDURE — 85027 COMPLETE CBC AUTOMATED: CPT | Performed by: PHYSICIAN ASSISTANT

## 2018-05-21 PROCEDURE — 63600000 HC DRUGS/DETAIL CODE

## 2018-05-21 PROCEDURE — 25800000 HC PHARMACY IV SOLUTIONS: Performed by: PHYSICIAN ASSISTANT

## 2018-05-21 PROCEDURE — 82810 BLOOD GASES O2 SAT ONLY: CPT | Performed by: PHYSICIAN ASSISTANT

## 2018-05-21 PROCEDURE — P9045 ALBUMIN (HUMAN), 5%, 250 ML: HCPCS | Performed by: PHYSICIAN ASSISTANT

## 2018-05-21 PROCEDURE — 83735 ASSAY OF MAGNESIUM: CPT | Performed by: PHYSICIAN ASSISTANT

## 2018-05-21 PROCEDURE — 97161 PT EVAL LOW COMPLEX 20 MIN: CPT | Mod: GP

## 2018-05-21 PROCEDURE — 63700000 HC SELF-ADMINISTRABLE DRUG: Performed by: PHYSICIAN ASSISTANT

## 2018-05-21 PROCEDURE — 83735 ASSAY OF MAGNESIUM: CPT | Performed by: THORACIC SURGERY (CARDIOTHORACIC VASCULAR SURGERY)

## 2018-05-21 PROCEDURE — S0171 BUMETANIDE 0.5 MG: HCPCS | Performed by: PHYSICIAN ASSISTANT

## 2018-05-21 PROCEDURE — 36569 INSJ PICC 5 YR+ W/O IMAGING: CPT

## 2018-05-21 PROCEDURE — 63600000 HC DRUGS/DETAIL CODE: Performed by: NURSE PRACTITIONER

## 2018-05-21 PROCEDURE — 36415 COLL VENOUS BLD VENIPUNCTURE: CPT | Performed by: PHYSICIAN ASSISTANT

## 2018-05-21 PROCEDURE — 80048 BASIC METABOLIC PNL TOTAL CA: CPT | Performed by: PHYSICIAN ASSISTANT

## 2018-05-21 PROCEDURE — 84132 ASSAY OF SERUM POTASSIUM: CPT | Performed by: PHYSICIAN ASSISTANT

## 2018-05-21 PROCEDURE — 71045 X-RAY EXAM CHEST 1 VIEW: CPT

## 2018-05-21 PROCEDURE — 25000000 HC PHARMACY GENERAL: Performed by: PHYSICIAN ASSISTANT

## 2018-05-21 PROCEDURE — C1751 CATH, INF, PER/CENT/MIDLINE: HCPCS

## 2018-05-21 PROCEDURE — 85027 COMPLETE CBC AUTOMATED: CPT | Performed by: THORACIC SURGERY (CARDIOTHORACIC VASCULAR SURGERY)

## 2018-05-21 PROCEDURE — 99232 SBSQ HOSP IP/OBS MODERATE 35: CPT | Performed by: SURGERY

## 2018-05-21 PROCEDURE — 93010 ELECTROCARDIOGRAM REPORT: CPT | Performed by: INTERNAL MEDICINE

## 2018-05-21 PROCEDURE — 63700000 HC SELF-ADMINISTRABLE DRUG: Performed by: NURSE PRACTITIONER

## 2018-05-21 RX ORDER — POTASSIUM CHLORIDE 750 MG/1
20 TABLET, FILM COATED, EXTENDED RELEASE ORAL 2 TIMES DAILY
Status: DISCONTINUED | OUTPATIENT
Start: 2018-05-21 | End: 2018-05-25

## 2018-05-21 RX ORDER — HYDROMORPHONE HYDROCHLORIDE 1 MG/ML
.25-.5 INJECTION, SOLUTION INTRAMUSCULAR; INTRAVENOUS; SUBCUTANEOUS
Status: DISCONTINUED | OUTPATIENT
Start: 2018-05-21 | End: 2018-05-24

## 2018-05-21 RX ORDER — BUMETANIDE 0.25 MG/ML
1 INJECTION, SOLUTION INTRAMUSCULAR; INTRAVENOUS ONCE
Status: COMPLETED | OUTPATIENT
Start: 2018-05-21 | End: 2018-05-21

## 2018-05-21 RX ORDER — HYDROMORPHONE HYDROCHLORIDE 1 MG/ML
INJECTION, SOLUTION INTRAMUSCULAR; INTRAVENOUS; SUBCUTANEOUS
Status: COMPLETED
Start: 2018-05-21 | End: 2018-05-21

## 2018-05-21 RX ORDER — AMIODARONE HYDROCHLORIDE 200 MG/1
400 TABLET ORAL 3 TIMES DAILY
Status: DISCONTINUED | OUTPATIENT
Start: 2018-05-21 | End: 2018-05-25

## 2018-05-21 RX ORDER — COLCHICINE 0.6 MG/1
0.6 TABLET ORAL DAILY
Status: DISCONTINUED | OUTPATIENT
Start: 2018-05-21 | End: 2018-05-26 | Stop reason: HOSPADM

## 2018-05-21 RX ORDER — INSULIN GLARGINE 100 [IU]/ML
20 INJECTION, SOLUTION SUBCUTANEOUS NIGHTLY
Status: DISCONTINUED | OUTPATIENT
Start: 2018-05-22 | End: 2018-05-26 | Stop reason: HOSPADM

## 2018-05-21 RX ORDER — OXYCODONE HYDROCHLORIDE 5 MG/1
5-10 TABLET ORAL EVERY 4 HOURS PRN
Status: DISCONTINUED | OUTPATIENT
Start: 2018-05-21 | End: 2018-05-26 | Stop reason: HOSPADM

## 2018-05-21 RX ORDER — INSULIN ASPART 100 [IU]/ML
6 INJECTION, SOLUTION INTRAVENOUS; SUBCUTANEOUS
Status: DISCONTINUED | OUTPATIENT
Start: 2018-05-22 | End: 2018-05-26 | Stop reason: HOSPADM

## 2018-05-21 RX ORDER — INSULIN ASPART 100 [IU]/ML
4 INJECTION, SOLUTION INTRAVENOUS; SUBCUTANEOUS
Status: DISCONTINUED | OUTPATIENT
Start: 2018-05-21 | End: 2018-05-21

## 2018-05-21 RX ORDER — ALBUTEROL SULFATE 0.83 MG/ML
2.5 SOLUTION RESPIRATORY (INHALATION)
Status: DISCONTINUED | OUTPATIENT
Start: 2018-05-21 | End: 2018-05-26 | Stop reason: HOSPADM

## 2018-05-21 RX ORDER — BUMETANIDE 0.25 MG/ML
0.5 INJECTION, SOLUTION INTRAMUSCULAR; INTRAVENOUS ONCE
Status: DISCONTINUED | OUTPATIENT
Start: 2018-05-21 | End: 2018-05-21

## 2018-05-21 RX ORDER — INSULIN ASPART 100 [IU]/ML
7 INJECTION, SOLUTION INTRAVENOUS; SUBCUTANEOUS ONCE
Status: COMPLETED | OUTPATIENT
Start: 2018-05-22 | End: 2018-05-22

## 2018-05-21 RX ORDER — LEVOTHYROXINE SODIUM 150 UG/1
150 TABLET ORAL
Status: DISCONTINUED | OUTPATIENT
Start: 2018-05-22 | End: 2018-05-26 | Stop reason: HOSPADM

## 2018-05-21 RX ORDER — INSULIN ASPART 100 [IU]/ML
3-5 INJECTION, SOLUTION INTRAVENOUS; SUBCUTANEOUS
Status: DISCONTINUED | OUTPATIENT
Start: 2018-05-22 | End: 2018-05-26 | Stop reason: HOSPADM

## 2018-05-21 RX ORDER — DOBUTAMINE HYDROCHLORIDE 200 MG/100ML
.5-2 INJECTION INTRAVENOUS
Status: DISCONTINUED | OUTPATIENT
Start: 2018-05-21 | End: 2018-05-24

## 2018-05-21 RX ORDER — HEPARIN SODIUM 5000 [USP'U]/ML
5000 INJECTION, SOLUTION INTRAVENOUS; SUBCUTANEOUS EVERY 8 HOURS
Status: DISCONTINUED | OUTPATIENT
Start: 2018-05-21 | End: 2018-05-26 | Stop reason: HOSPADM

## 2018-05-21 RX ADMIN — OXYCODONE HYDROCHLORIDE 5 MG: 5 TABLET ORAL at 11:50

## 2018-05-21 RX ADMIN — HYDROMORPHONE HYDROCHLORIDE 0.5 MG: 1 INJECTION, SOLUTION INTRAMUSCULAR; INTRAVENOUS; SUBCUTANEOUS at 19:51

## 2018-05-21 RX ADMIN — SODIUM CHLORIDE 6 MCG/MIN: 900 INJECTION, SOLUTION INTRAVENOUS at 06:03

## 2018-05-21 RX ADMIN — HYDROMORPHONE HYDROCHLORIDE 0.5 MG: 1 INJECTION, SOLUTION INTRAMUSCULAR; INTRAVENOUS; SUBCUTANEOUS at 05:02

## 2018-05-21 RX ADMIN — CALCIUM CHLORIDE 1 G: 100 INJECTION, SOLUTION INTRAVENOUS at 18:29

## 2018-05-21 RX ADMIN — HYDROMORPHONE HYDROCHLORIDE 0.5 MG: 1 INJECTION, SOLUTION INTRAMUSCULAR; INTRAVENOUS; SUBCUTANEOUS at 05:54

## 2018-05-21 RX ADMIN — AMIODARONE HYDROCHLORIDE 1 MG/MIN: 1.8 INJECTION, SOLUTION INTRAVENOUS at 19:41

## 2018-05-21 RX ADMIN — VASOPRESSIN 0.04 UNITS/MIN: 20 INJECTION INTRAVENOUS at 01:14

## 2018-05-21 RX ADMIN — AMIODARONE HYDROCHLORIDE 400 MG: 200 TABLET ORAL at 21:27

## 2018-05-21 RX ADMIN — POTASSIUM CHLORIDE 20 MEQ: 200 INJECTION, SOLUTION INTRAVENOUS at 04:33

## 2018-05-21 RX ADMIN — HEPARIN SODIUM 5000 UNITS: 5000 INJECTION INTRAVENOUS; SUBCUTANEOUS at 14:12

## 2018-05-21 RX ADMIN — INSULIN ASPART 4 UNITS: 100 INJECTION, SOLUTION INTRAVENOUS; SUBCUTANEOUS at 12:43

## 2018-05-21 RX ADMIN — ACETAMINOPHEN 650 MG: 325 TABLET, FILM COATED ORAL at 14:11

## 2018-05-21 RX ADMIN — HYDROMORPHONE HYDROCHLORIDE 0.5 MG: 1 INJECTION, SOLUTION INTRAMUSCULAR; INTRAVENOUS; SUBCUTANEOUS at 22:06

## 2018-05-21 RX ADMIN — AMIODARONE HYDROCHLORIDE 1 MG/MIN: 1.8 INJECTION, SOLUTION INTRAVENOUS at 14:05

## 2018-05-21 RX ADMIN — HYDROMORPHONE HYDROCHLORIDE: 1 INJECTION, SOLUTION INTRAMUSCULAR; INTRAVENOUS; SUBCUTANEOUS at 15:00

## 2018-05-21 RX ADMIN — VASOPRESSIN 0.04 UNITS/MIN: 20 INJECTION INTRAVENOUS at 09:24

## 2018-05-21 RX ADMIN — FAMOTIDINE 20 MG: 20 TABLET ORAL at 08:25

## 2018-05-21 RX ADMIN — Medication 2 G: at 01:27

## 2018-05-21 RX ADMIN — OXYCODONE HYDROCHLORIDE 5 MG: 5 TABLET ORAL at 07:05

## 2018-05-21 RX ADMIN — AMIODARONE HYDROCHLORIDE 1 MG/MIN: 1.8 INJECTION, SOLUTION INTRAVENOUS at 02:06

## 2018-05-21 RX ADMIN — LEVOTHYROXINE SODIUM 200 MCG: 200 TABLET ORAL at 06:32

## 2018-05-21 RX ADMIN — HYDROMORPHONE HYDROCHLORIDE 0.5 MG: 2 INJECTION, SOLUTION INTRAMUSCULAR; INTRAVENOUS; SUBCUTANEOUS at 14:56

## 2018-05-21 RX ADMIN — COLCHICINE 0.6 MG: 0.6 TABLET, FILM COATED ORAL at 11:42

## 2018-05-21 RX ADMIN — HEPARIN SODIUM 5000 UNITS: 5000 INJECTION INTRAVENOUS; SUBCUTANEOUS at 22:14

## 2018-05-21 RX ADMIN — INSULIN ASPART 4 UNITS: 100 INJECTION, SOLUTION INTRAVENOUS; SUBCUTANEOUS at 16:56

## 2018-05-21 RX ADMIN — HYDROMORPHONE HYDROCHLORIDE: 1 INJECTION, SOLUTION INTRAMUSCULAR; INTRAVENOUS; SUBCUTANEOUS at 11:46

## 2018-05-21 RX ADMIN — SENNOSIDES 1 TABLET: 8.6 TABLET, FILM COATED ORAL at 19:27

## 2018-05-21 RX ADMIN — DOBUTAMINE IN DEXTROSE 3 MCG/KG/MIN: 200 INJECTION, SOLUTION INTRAVENOUS at 14:05

## 2018-05-21 RX ADMIN — MAGNESIUM OXIDE TAB 400 MG (240 MG ELEMENTAL MG) 400 MG: 400 (240 MG) TAB at 08:24

## 2018-05-21 RX ADMIN — MILRINONE LACTATE IN DEXTROSE 0.12 MCG/KG/MIN: 200 INJECTION, SOLUTION INTRAVENOUS at 14:06

## 2018-05-21 RX ADMIN — FUROSEMIDE 40 MG: 40 TABLET ORAL at 08:25

## 2018-05-21 RX ADMIN — CHLORHEXIDINE GLUCONATE 15 ML: 1.2 RINSE ORAL at 19:27

## 2018-05-21 RX ADMIN — MAGNESIUM OXIDE TAB 400 MG (240 MG ELEMENTAL MG) 400 MG: 400 (240 MG) TAB at 19:27

## 2018-05-21 RX ADMIN — HYDROMORPHONE HYDROCHLORIDE 0.5 MG: 1 INJECTION, SOLUTION INTRAMUSCULAR; INTRAVENOUS; SUBCUTANEOUS at 23:10

## 2018-05-21 RX ADMIN — POTASSIUM CHLORIDE 20 MEQ: 750 TABLET, FILM COATED, EXTENDED RELEASE ORAL at 19:27

## 2018-05-21 RX ADMIN — CHLORHEXIDINE GLUCONATE 15 ML: 1.2 RINSE ORAL at 08:24

## 2018-05-21 RX ADMIN — DEXMEDETOMIDINE HYDROCHLORIDE 0.4 MCG/KG/HR: 100 INJECTION, SOLUTION INTRAVENOUS at 07:06

## 2018-05-21 RX ADMIN — ACETAMINOPHEN 650 MG: 325 TABLET, FILM COATED ORAL at 08:25

## 2018-05-21 RX ADMIN — ALBUTEROL SULFATE 2.5 MG: 2.5 SOLUTION RESPIRATORY (INHALATION) at 14:58

## 2018-05-21 RX ADMIN — ALBUTEROL SULFATE 2.5 MG: 2.5 SOLUTION RESPIRATORY (INHALATION) at 19:35

## 2018-05-21 RX ADMIN — SODIUM CHLORIDE 2 MCG/MIN: 900 INJECTION, SOLUTION INTRAVENOUS at 05:58

## 2018-05-21 RX ADMIN — FAMOTIDINE 20 MG: 20 TABLET ORAL at 19:27

## 2018-05-21 RX ADMIN — DEXMEDETOMIDINE HYDROCHLORIDE 0.2 MCG/KG/HR: 100 INJECTION, SOLUTION INTRAVENOUS at 07:03

## 2018-05-21 RX ADMIN — VASOPRESSIN 0.04 UNITS/MIN: 20 INJECTION INTRAVENOUS at 14:06

## 2018-05-21 RX ADMIN — HYDROMORPHONE HYDROCHLORIDE 0.5 MG: 2 INJECTION, SOLUTION INTRAMUSCULAR; INTRAVENOUS; SUBCUTANEOUS at 10:58

## 2018-05-21 RX ADMIN — BUMETANIDE 1 MG: 0.25 INJECTION INTRAMUSCULAR; INTRAVENOUS at 17:07

## 2018-05-21 RX ADMIN — ATORVASTATIN CALCIUM 80 MG: 80 TABLET, FILM COATED ORAL at 21:28

## 2018-05-21 RX ADMIN — KETOROLAC TROMETHAMINE 30 MG: 30 INJECTION, SOLUTION INTRAMUSCULAR at 00:27

## 2018-05-21 RX ADMIN — SODIUM CHLORIDE 2.34 UNITS/HR: 9 INJECTION, SOLUTION INTRAVENOUS at 03:11

## 2018-05-21 RX ADMIN — DEXMEDETOMIDINE HYDROCHLORIDE 0.2 MCG/KG/HR: 100 INJECTION, SOLUTION INTRAVENOUS at 02:08

## 2018-05-21 RX ADMIN — ALBUMIN (HUMAN) 500 ML: 12.5 INJECTION, SOLUTION INTRAVENOUS at 08:51

## 2018-05-21 RX ADMIN — AMIODARONE HYDROCHLORIDE 1 MG/MIN: 1.8 INJECTION, SOLUTION INTRAVENOUS at 07:54

## 2018-05-21 RX ADMIN — ASPIRIN 81 MG: 81 TABLET, DELAYED RELEASE ORAL at 08:24

## 2018-05-21 RX ADMIN — SODIUM CHLORIDE 5.99 UNITS/HR: 9 INJECTION, SOLUTION INTRAVENOUS at 06:18

## 2018-05-21 RX ADMIN — DOCUSATE SODIUM 100 MG: 100 CAPSULE, LIQUID FILLED ORAL at 19:27

## 2018-05-21 RX ADMIN — POTASSIUM CHLORIDE 20 MEQ: 750 TABLET, FILM COATED, EXTENDED RELEASE ORAL at 08:25

## 2018-05-21 ASSESSMENT — ENCOUNTER SYMPTOMS
DIZZINESS: 0
LIGHT-HEADEDNESS: 0
EYE PAIN: 0
PALPITATIONS: 0
VOMITING: 0
FEVER: 0
ALTERED MENTAL STATUS: 0
SHORTNESS OF BREATH: 0
CHILLS: 0
NAUSEA: 0
COUGH: 0
NEAR-SYNCOPE: 0
HEADACHES: 0

## 2018-05-21 ASSESSMENT — COGNITIVE AND FUNCTIONAL STATUS - GENERAL
CLIMB 3 TO 5 STEPS WITH RAILING: 2 - A LOT
STANDING UP FROM CHAIR USING ARMS: 3 - A LITTLE
MOVING TO AND FROM BED TO CHAIR: 3 - A LITTLE
WALKING IN HOSPITAL ROOM: 3 - A LITTLE

## 2018-05-21 NOTE — PROGRESS NOTES
General Surgery Daily Progress Note    Subjective     Interval History: Awake and interactive, pain controlled, still on pressors    Objective     Vital signs:    Temperature:  Temp (24hrs), Av.5 °C (97.7 °F), Min:36.5 °C (97.7 °F), Max:36.5 °C (97.7 °F)     BP Max: Systolic (24hrs), Av , Min:108 , Max:111      Recent:  Patient Vitals for the past 4 hrs:   Pulse SpO2   18 0501 72 (!) 91 %   18 0500 71 92 %   18 0434 68 94 %   18 0400 73 92 %   18 0300 69 95 %   18 0200 71 94 %      Ranges: Temp:  [36.5 °C (97.7 °F)] 36.5 °C (97.7 °F)  Heart Rate:  [] 72  Resp:  [18-22] 18  BP: (108-111)/(61-64) 108/64  FiO2 (%) (Set):  [40 %-60 %] 40 %    I/Os:    I/O last 3 completed shifts:  In: 4158.4 [P.O.:680; I.V.:3053.4; Blood:325; IV Piggyback:100]  Out: 4805 [Urine:2560; Other:2145; Chest Tube:100]  I/O this shift:  In: 1497.1 [I.V.:1447.1; IV Piggyback:50]  Out: 820 [Urine:570; Chest Tube:250]     Intake/Output Summary (Last 24 hours) at 18 0539  Last data filed at 18 0500   Gross per 24 hour   Intake          4705.46 ml   Output             4525 ml   Net           180.46 ml         Physical Exam    General appearance: alert, appears stated age and cooperative, in chair  HEENT: within normal limits  Neck: no adenopathy, no carotid bruit, no JVD and supple, symmetrical, trachea midline  Lungs/Chest wall: clear to auscultation bilaterally  Heart: regular rate and rhythm  Abdomen: soft, nontender, nondistended, small left inguinal hernia  Gu: within normal limits  Extremities: extremities normal, warm and well-perfused; no cyanosis, clubbing, or edema  Pulses: 2+ and symmetric  Skin: Skin color, texture, turgor normal. No rashes or lesions  Neurologic: Grossly normal    VTE Assessment: I have reassessed and the patient's VTE risk and treatment plan is appropriate.    MEDICATIONS:  Infusions:    amiodarone (CORDARONE) IV infusion 1 mg/min Last Rate: 1 mg/min  (05/21/18 0500)   dexmedetomidine in 0.9 % NaCl 0.2-1.5 mcg/kg/hr Last Rate: 0.2 mcg/kg/hr (05/21/18 0500)   EPINEPHrine 1 mcg/min Last Rate: 1 mcg/min (05/21/18 0500)   insulin 0-15 Units/hr Last Rate: 2.34 Units/hr (05/21/18 0500)   milrinone 0.25 mcg/kg/min Last Rate: 0.25 mcg/kg/min (05/21/18 0500)   niCARdipine (CARDENE) infusion 2.5-15 mg/hr    norepinephrine 0.5-30 mcg/min Last Rate: 4 mcg/min (05/21/18 0515)   vasopressin (PITRESSIN) infusion 0.04 Units/min Last Rate: 0.04 Units/min (05/21/18 0500)          Scheduled:   aspirin      aspirin 81 mg oral Daily   atorvastatin 80 mg oral Nightly   chlorhexidine 15 mL Mouth/Throat BID   docusate sodium 100 mg oral BID   famotidine 20 mg intravenous BID   Or      famotidine 20 mg oral BID   furosemide 40 mg oral BID (9a, 4p)   Followed by      [START ON 5/24/2018] furosemide 40 mg oral Daily   heparin (porcine)      levothyroxine 200 mcg oral Daily (6:30a)   lidocaine PF      magnesium oxide 400 mg oral BID   magnesium sulfate 2 g intravenous q8h INT   Followed by      magnesium sulfate 2 g intravenous Daily   metoprolol succinate XL 25 mg oral Nightly   Or      metoprolol succinate XL 50 mg oral Nightly   potassium chloride 20 mEq oral BID   senna 1 tablet oral BID     PRN:     acetaminophen 650 mg q4h PRN   albumin human 500 mL PRN   alum-mag hydroxide-simeth 30 mL q4h PRN   [START ON 5/23/2018] bisacodyl 10 mg PRN   calcium chloride 1 g q6h PRN   oxyCODONE 5-10 mg q4h PRN   Or     HYDROmorphone 0.25-0.5 mg q3h PRN   magnesium sulfate 2 g PRN   meperidine 12.5 mg PRN   ondansetron ODT 4 mg q8h PRN   Or     ondansetron 4 mg q8h PRN   potassium chloride 20 mEq q8h PRN   sodium chloride 0.9 % 5 mL/hr PRN   sodium chloride 0.9 % 5 mL/hr PRN   sodium chloride 0.9 % 5 mL/hr PRN        Labs  CBC Results       05/21/18 05/20/18 05/20/18                    0304 2224 1759         WBC 6.23 6.26 5.91         RBC 3.59 (L) 3.55 (L) 3.39 (L)         HGB 9.6 (L) 9.7 (L) 9.0 (L)          HCT 29.7 (L) 29.9 (L) 28.8 (L)         MCV 82.7 (L) 84.2 85.0         MCH 26.7 (L) 27.3 (L) 26.5 (L)         MCHC 32.3 32.4 31.3 (L)          200 196                     BMP Results       05/21/18 05/20/18 05/20/18                    0304 2224 1759          - -         K 3.9 4.5 4.7          3.9           Cl 107 - -         CO2 23 - -         Glucose 153 (H) - -         BUN 28 (H) - -         Creatinine 1.4 (H) - -         Calcium 8.7 (L) - -         Anion Gap 9 - -         EGFR 48.9 (L) - -                       Pathology results:  Pathology Results     ** No results found for the last 720 hours. **          Imaging  No results found.       Assessment/Plan      77-year-old male status postCABG for an STEMI, followed by general surgery for initial complaints of abdominal pain and notable finding of left fat-containing inguinal hernia    Post CABG care by CT surgery  Plan for elective repair of left inguinal hernia about 8 weeks after resolution of the patient's cardiac symptoms  [id number] with questions    Expected Discharge Date:   5/18/2018    Drake Schaeffer MD

## 2018-05-21 NOTE — PLAN OF CARE
Problem: Cardiac Surgery (Adult)  Goal: Anesthesia/Sedation Recovery   05/21/18 0559   Goal/Outcome Evaluation   Anesthesia/Sedation Recovery recovered to baseline

## 2018-05-21 NOTE — PROGRESS NOTES
Patient: Maximus Daniel      Anesthesia post-operative note:    Patient seen, evaluated;  No complaints related to anesthesia care;  Post-anesthesia pain: adequate analgesia  Vitals stable;  Cardiovascular function is stable;  No nausea/vomiting;  Hydration adequate;  Level of consciousness: awake,alert,oriented;  Neuro exam - WNL;  Respiration - WNL;  No apparent anesthetic complications.      No further follow-up is indicated;  Call anesthesia service if have further questions.

## 2018-05-21 NOTE — PLAN OF CARE
Problem: Patient Care Overview  Goal: Plan of Care Review  Outcome: Ongoing (interventions implemented as appropriate)   05/21/18 1131   Coping/Psychosocial   Plan Of Care Reviewed With patient;kurt   Plan of Care Review   Progress progress toward functional goals as expected   Outcome Summary limited by pain, decreased cardiopulm endurance       Problem: Cardiac Surgery (Adult)  Goal: Signs and Symptoms of Listed Potential Problems Will be Absent, Minimized or Managed (Cardiac Surgery)  Outcome: Ongoing (interventions implemented as appropriate)      Problem: Acute Therapy Services Goal & Intervention Plan  Goal: Bed Mobility Goal  Outcome: Ongoing (interventions implemented as appropriate)    Goal: Gait Training Goal  Outcome: Ongoing (interventions implemented as appropriate)    Goal: Transfer Training Goal  Outcome: Ongoing (interventions implemented as appropriate)

## 2018-05-21 NOTE — PROGRESS NOTES
Patient: Maximus Daniel  Location: [address]  MRN: [id number]  Today's date: 5/21/2018      Pt left in bed with bed alarm on, call bell and personal items within reach, Rn and 2 sons in room          Pain/Vitals - 05/21/18 1101        Pain/Comfort/Sleep    Presence of Pain complains of pain/discomfort    Preferred Pain Scale word (verbal rating pain scale)    Pain Body Location chest    Pain Rating: Rest 2 - mild pain    Pain Rating: Activity 6 - moderate-severe pain    Pain Management Interventions pillow support provided;position adjusted;relaxation techniques promoted       Vital Signs    Pulse 68    SpO2 (!)  91 %    Patient Activity Other (Comment)   post activity    Oxygen Therapy Supplemental oxygen    O2 Delivery Method High flow nasal cannula    O2 Flow Rate (L/min) 15 L/min    BP (!)  142/64          Prior Living Environment  Lives With: alone  Living Arrangements: apartment  Living Environment Comment: 2nd fl apt with elevator access Equipment Currently Used at Home: none       Prior Level of Function  Ambulation: independent  Transferring: independent  Toileting: independent  Bathing: independent  Dressing: independent  Eating: independent  Communication: understands/communicates without difficulty  Swallowing: swallows foods/liquids without difficulty  Equipment Currently Used at Home: none           PT Evaluation - 05/21/18 1101        Session Details    Document Type initial evaluation    Mode of Treatment physical therapy       Time Calculation    Start Time 1101    Stop Time 1119    Time Calculation (min) 18 min       General Information    Patient Profile Reviewed? yes    Onset of Illness/Injury or Date of Surgery 05/20/18    Pertinent History of Current Functional Problem s/p Cabg x 4    Existing Precautions/Restrictions cardiac;fall       Orientation Log    Kind of Place 3-->spontaneous/free recall    Name of Hospital 3-->spontaneous/free recall    Month  3-->spontaneous/free recall    Date 3-->spontaneous/free recall       Sensory    Sensory General Assessment no sensation deficits identified       Range of Motion (ROM)    General Range of Motion no range of motion deficits identified       Manual Muscle Testing (MMT)    General MMT Assessment no strength deficits identified       Bed Chair WC Transfer Training    Bed Mobility Assessment/Interventions supine to sit;sit to supine    Sit-Stand Transfers, [place] minimum assist (75% patient effort);2 person assist    Stand-Sit Transfers, [place] minimum assist (75% patient effort);2 person assist    Sit to Supine, [place] moderate assist (50% patient effort);2 person assist       Gait Training    [place] minimum assist (75% or more patient effort);1 person assist;1 person to manage equipment    Assistive Device --   HHA    Distance in Feet 5 feet    Gait Deviations Identified decreased mely;decreased heel strike;decreased step length;decreased stride length       AM-PAC (TM) - Mobility    Turning from your back to your side while in a flat bed without using bedrails? 2 - A Lot    Moving from lying on your back to sitting on the side of a flat bed without using bedrails? 2 - A Lot    Moving to and from a bed to a chair? 3 - A Little    Standing up from a chair using your arms? 3 - A Little    To walk in a hospital room? 3 - A Little    Climbing 3-5 steps with a railing? 2 - A Lot    AM-PAC (TM) Mobility Score 15       PT Clinical Impression    Patient's Goals For Discharge return home;take care of myself at home;return to all previous roles/activities    Plan For Care Reviewed: Physical Therapy patient voices agreement with PT plan for care;patient feedback incorporated in PT plan for care;PT plan for care discussed with patient    Impairments Found (PT Eval) aerobic capacity/endurance;gait, locomotion, and balance    Rehab Potential/Prognosis good, to achieve stated therapy goals    PT Frequency  of Treatment 5-7 times per week    Problem List pain;impaired balance   decreased endurance    Anticipated Equipment Needs at Discharge --   poss RW    Expected Discharge Disposition home with assist    Daily Outcome Statement pt presents with min functional deficits, limited by pain, decreased balance and endurance. anticipate pt will be able to return home with assist from family. however, will cont to reassess poss need for SNF pending progress and availale supports.                   Education provided this session. See the Patient Education summary report for full details.    PT Care Plan Goals    Flowsheet Row Most Recent Value   Bed Mobility Goal   Date Goal Established: Bed Mobility  05/21/18   Time to Achieve Goal: Bed Mobility  by discharge   Goal Activity: Bed Mobility  rolling to left, rolling to right, sit to supine/supine to sit   Goal Outcome Achieved: Bed Mobility  goal ongoing   Gait Training Goal   Date Goal Established: Gait Training  05/21/18   Time to Achieve Goal: Gait Training  by discharge   Level of [place] Goal: Gait Training  modified independence   Assistive Device Used: Gait Training  walker, rolling   Distance Goal: Gait Training (feet)  250 feet   Goal Outcome Achieved: Gait Training  goal ongoing   Goal Transfer Training   Date Goal Established: Transfer Training  05/21/18   Time to Achieve Goal: Transfer Training  by discharge   Goal Activity: Transfer Training  sit-to-stand/stand-to-sit, bed-to-chair/chair-to-bed   Transfer Training Goal, [place] Level  independent   Goal Outcome Achieved: Transfer Training  goal ongoing

## 2018-05-21 NOTE — CONSULTS
Physical Medicine and Rehabilitation Consult Note    Subjective     Maximus Daniel is a 77 y.o. male who was admitted for Chest pain [R07.9]  Chest pain [R07.9]  NSTEMI (non-ST elevated myocardial infarction) (CMS/HCC) ([facility]) [I21.4]  Chest pain [R07.9]. We were asked to see for rehabilitation needs. Patient is a 77-year-old gentleman with a past history of coronary disease, diabetes type 1, hypertension who is admitted with chest pain on 5/17/2018 at [facility].  Patient was transferred to [facility] on 518 for cardiac catheterization after noted to have a bump in troponins.  He ruled in for STEMI patient was also found to have an incarcerated left inguinal hernia.  Was seen by surgery who felt this could be done at a later date and cardiac status was high priority.  Patient went to the cardiac Cath Lab and was found to have multivessel disease.  He was evaluated by Dr. Slater and on 5/20/2018 patient underwent a CABG ×4 with alignment to the LAD, SVG to diagonal, SVG to obtuse marginal 1, SVG obtuse marginal 2.  Patient received 3 units red blood cells and 4 units fresh frozen plasma intraoperatively.  Patient on multiple pressors transferred to the cardiac unit.  Patient now extubated and ready for cardiac rehab.  We have been asked to evaluate and begin mobilization.  Patient states she he had slight chest pain earlier but now feels fine.  Denies any headaches dizziness visual changes hearing loss numbness tingling chest pain shortness of breath nausea or vomiting.  We are asked to begin cardiac rehab.    Medical History:   Past Medical History:   Diagnosis Date   • Coronary artery disease    • Diabetes mellitus type I (CMS/HCC) ([facility])    • Hypertension    • Hypothyroidism    • Incarcerated hernia    • Lipid disorder    • Myocardial infarction    • Nephrolithiasis        Surgical History:   Past Surgical History:   Procedure Laterality Date   • ADENOIDECTOMY     • FRACTURE SURGERY     • SKIN BIOPSY      • SKIN CANCER EXCISION     • TONSILLECTOMY         Social History:   Social History   Lives in an elevator accessible apartment.  No steps to enter.  Completely independent and continues to drive.  He is retired.  Social History Narrative    Lives alone in an apartment/efficiency.  Does have elevator.  Daughter is local and is power of        Lives with:   Alone has supportive family around him.    Prior Function Level:   Independent      Family History:   Family History   Problem Relation Age of Onset   • Heart disease Mother    • Suicide Attempts Father    • Depression Father    • Heart disease Brother        History also provided by: The patient      Allergies: Patient has no known allergies.      aspirin      aspirin 81 mg oral Daily   atorvastatin 80 mg oral Nightly   chlorhexidine 15 mL Mouth/Throat BID   colchicine 0.6 mg oral Daily   docusate sodium 100 mg oral BID   famotidine 20 mg intravenous BID   Or      famotidine 20 mg oral BID   furosemide 40 mg oral BID (9a, 4p)   Followed by      [START ON 5/24/2018] furosemide 40 mg oral Daily   heparin (porcine)      heparin (porcine) 5,000 Units subcutaneous q8h KANDI   insulin aspart U-100 4 Units subcutaneous TID with meals   levothyroxine 200 mcg oral Daily (6:30a)   lidocaine PF      magnesium oxide 400 mg oral BID   magnesium sulfate 2 g intravenous q8h INT   Followed by      magnesium sulfate 2 g intravenous Daily   metoprolol succinate XL 25 mg oral Nightly   Or      metoprolol succinate XL 50 mg oral Nightly   potassium chloride 20 mEq oral BID   senna 1 tablet oral BID       Review of Systems   All 11 systems were reviewed and negative except as noted in the HPI.      Objective   Labs        Results from last 7 days  Lab Units 05/21/18  0304 05/20/18  2224 05/20/18  1759 05/20/18  1357 05/19/18  0154   SODIUM mmol/L 139  --   --  134* 137   POTASSIUM mEQ/L 3.9  3.9 4.5 4.7 3.8 3.7   CHLORIDE mmol/L 107  --   --  100 105   CO2 mmol/L 23  --   --  " 20* 25   BUN mg/dL 28*  --   --  19 15   CREATININE mg/dL 1.4*  --   --  1.2 1.1   GLUCOSE mg/dL 153*  --   --  154* 145*   CALCIUM mg/dL 8.7*  --   --  8.0* 8.4*         Results from last 7 days  Lab Units 05/21/18  0304 05/20/18  2224 05/20/18  1759   WBC K/uL 6.23 6.26 5.91   HEMOGLOBIN g/dL 9.6* 9.7* 9.0*   HEMATOCRIT % 29.7* 29.9* 28.8*   PLATELETS K/uL 202 200 196         Imaging    -CT of the chest on 5/19/2018:  IMPRESSION:     1.  Moderate calcified coronary plaque.  2.  No acute cardiopulmonary abnormality.  3.  Moderate-sized hiatal hernia.    I reviewed the labs and the CAT scan.      Physical Exam  /64   Pulse 62   Temp 36.5 °C (97.7 °F) (Bladder)   Resp 18   Ht 1.6 m (5' 3\")   Wt 90.5 kg (199 lb 8.3 oz)   SpO2 96%   BMI 35.34 kg/m²   Examination  HEENT: No jaundice.  Oral mucose moist.  No carotid bruits.  Atraumatic.  No nuchal rigidity.  Lungs: Breathing unlabored.  No rales or rhonchi.  Clear except for upper airway sounds.  Chest wall: Chest tube in place  Heart: Regular.  Abdomen: Bowel sounds: I could not palpate the hernia positive bowel sounds  Skin: No rash.  Extremities: No acutely inflamed joints.  ROM functional.  Neurological:  Alert and oriented with intact speech and cognition.  Cranial nerves symmetrical  Sensation: subjectively preserved to touch.  Motor testing shows no focal weakness.  Patient required minimal assistance out of bed to chair with nursing        Assessment   77 y.o. male being consulted for rehabilitation needs.      Plan of care was discussed with patient and CT surgery      1.  CABG ×4 with Dr. Slater: Patient with S TE MRI now status post CABG.  We will begin cardiac rehab.  The patient does live alone but I do feel he should do fairly well.  I did discuss with him possibly staying with family for a short period initially until he becomes confident and can return home by himself in his apartment.  Will begin physical therapy and mobilization " monitoring vitals.  If patient continues to require increased assistance we can consider a short segment of/TCC stay if necessary.    2.  Diabetes: Patient's blood sugars be monitored closely and covered as needed.    3.  Hypothyroidism: Patient on levothyroxine    4.  Incarcerated inguinal hernia on the left colon patient being followed by surgery.      Rachelle Mario M.D.  5/21/2018  10: 30 2 AM

## 2018-05-21 NOTE — PROGRESS NOTES
Cardiothoracic Intensivist Progress Note       CARDIOTHORACIC   Post-Operative Day # 1     Subjective     History of Present Illness: Maximus Daniel is a 77 y.o. cisgender male with history of known coronary artery disease prior to OR s/p CABG, chronic HTN, and hyperlipidemia that are being treated.                              Review of Systems:                                     Constitutional: no acute distress                                                  Eyes: denies difficulty with vision  Ears, nose, mouth, throat, and face: denies oral discomfort                                        Respiratory: denies shortness of breath                                  Cardiovascular: mild chest discomfort at incision site                                  Gastrointestinal: denies abdominal pain                                    Genitourinary: denies difficulty voiding                                      Hematologic: denies bleeding issues                                Musculoskeletal: denies muscle pain                                      Neurological: generalized weakness                                   Integumentary: denies rash      Medical History:   Past Medical History:   Diagnosis Date   • Coronary artery disease    • Diabetes mellitus type I (CMS/HCC) (HCC)    • Hypertension    • Hypothyroidism    • Incarcerated hernia    • Lipid disorder    • Myocardial infarction    • Nephrolithiasis        Surgical History:   Past Surgical History:   Procedure Laterality Date   • ADENOIDECTOMY     • FRACTURE SURGERY     • SKIN BIOPSY     • SKIN CANCER EXCISION     • TONSILLECTOMY         Allergies: Patient has no known allergies.    Social History:   Social History     Social History   • Marital status:      Spouse name: N/A   • Number of children: N/A   • Years of education: N/A     Occupational History   • retired     • retired [employer]       Social History Main Topics   • Smoking status: Current  Every Day Smoker     Packs/day: 0.25     Years: 65.00     Types: Cigarettes, Other (see comments)   • Smokeless tobacco: Never Used   • Alcohol use No   • Drug use: No   • Sexual activity: No     Other Topics Concern   • None     Social History Narrative    Lives alone in an apartment/efficiency.  Does have elevator.  Daughter is local and is power of        Family History:   Family History   Problem Relation Age of Onset   • Heart disease Mother    • Suicide Attempts Father    • Depression Father    • Heart disease Brother        Objective     Vital signs in last 24 hours:  Temp:  [36.5 °C (97.7 °F)] 36.5 °C (97.7 °F)  Heart Rate:  [] 62  Resp:  [18-19] 18  BP: (108)/(64) 108/64  FiO2 (%) (Set):  [40 %-60 %] 40 %      Intake/Output Summary (Last 24 hours) at 05/21/18 0930  Last data filed at 05/21/18 0900   Gross per 24 hour   Intake           5603.9 ml   Output             4745 ml   Net            858.9 ml     Intake/Output this shift:  I/O this shift:  In: 829.5 [I.V.:329.5; IV Piggyback:500]  Out: 110 [Urine:110]    Labs  Lab Results   Component Value Date    WBC 6.23 05/21/2018    HGB 9.6 (L) 05/21/2018    HCT 29.7 (L) 05/21/2018     05/21/2018    CHOL 152 05/19/2018    TRIG 101 05/19/2018    HDL 29 (L) 05/19/2018    ALT 53 05/18/2018     (H) 05/18/2018     05/21/2018    K 3.9 05/21/2018    K 3.9 05/21/2018     05/21/2018    CREATININE 1.4 (H) 05/21/2018    BUN 28 (H) 05/21/2018    CO2 23 05/21/2018    INR 1.6 05/20/2018    HGBA1C 10.5 (H) 05/19/2018       Full Code    Head/Ear/Nose/Throat:     NCAT.                               Eyes:     EOMI and PERRL.                     Respiratory:     diminished at the bases b/l.               Cardiovascular:     RRR and normal S1, S2.              Gastrointestinal:     + Bowel sounds and non-tender.                 Genitourinary:     medrano in place.                    Extremities:     trace edema b/l lower extremities.             "Musculoskeletal:      No injury or deformity.                   Neurological:     no focal deficits.       Behavior/Emotional:     appropriate and cooperative.                           Lymph:      No adenopathy noted.                               Skin:      Clean, dry and intact.     Examination of the patient performed at the bedside. Rounded with ICU team and discussed management/plan with surgical staff. Medications, radiological studies and labs reviewed and discussed with surgeon of record, Dr. Jeff Slater.    Cardiothoracic Assessment and Plan:    Neurologic: A&Ox3 and pain controlled. Will continue to optimize multi-modal pain regimen.     Cardiovascular: Severe CAD s/p CABGx4. On pressors for cardiac and vascular support. Will resuscitate, wean pressor and continue to optimize cardiac medications.     Respiratory: On HFNC with poor Po2. Likely secondary to chronic smoking history. I personally reviewed the CXR which portrayed small lung volumes b/l. Will encourage IS, mobilization and wean O2 PRN.      Genitourinary: CKD stage 3. Continue medrano for strict I/O. Making adequate urine output. Will avoid nephrotoxic medications.     Endocrine: Follow FSBG. Hypothryoidism and DM management.     Hematologic: No evidence active bleeding. SQH for DVT ppx. \"Acute (expected) blood loss anemia from surgery.      Infectious Disease: No indication for antibiotics at this time.      Fluids, Electrolytes: Lytes replaced per protocol.     Gastrointestinal: PO as tolerated. GI ppx.     Skin: OOB, Pt.      Tubes, lines and drains: Will remove superfluous invasive monitors PRN.    Disposition: Critically ill.  Pressor support s/p major invasive cardiac surgery. Continue ICU care.     I personally spent 35 minutes of critical care time with this patient not including procedure time.          Brandon Arambula, DO  5/21/2018           "

## 2018-05-21 NOTE — OP NOTE
Blank Op Note    Hospital:     Medical Record Number:  [id number]    Patient Name:  Maximus Daniel    YOB: 1940     Date of Operation:  5/18/2018 - 5/20/2018    Primary Surgeon:  Larisa Slater MD    First Assistant: Antonio Benavides MD  Second Assistant: Carol Ocampo PA-C    Preoperative Diagnosis:         * Chest pain [R07.9]     * NSTEMI (non-ST elevated myocardial infarction) (CMS/HCC) ([facility]) [i21.4]  Hypertension, hyperlipidemia, tobacco use, non-insulin-dependent diabetes mellitus,    Postoperative Diagnosis:   Chest pain [R07.9]     * NSTEMI (non-ST elevated myocardial infarction) (CMS/HCC) ([facility]) [i21.4]  Hypertension, hyperlipidemia, tobacco use, non-insulin-dependent diabetes mellitus,      Operation:  Coronary artery bypass grafting × 4    Anesthesia: General    Anesthesiologist: Anesthesiologist: Blayne Rajan MD       Operative Findings:  3-vessel coronary artery disease    Procedure Details   Maximus Daniel is a 77 y.o. male with three-vessel coronary artery disease and acute myocardial infarction.  The patient was taken to the operating room and was placed in supine position on the operating table. The patient was given general endotracheal anesthesia. A Hernandez Phuc catheter, arterial line and medrano catheter were inserted. The chest, abdomen and lower extremities were prepped and draped in the usual sterile fashion. A full sternotomy incision was done . The incision was deepened through the subcutaneous tissue and the fascia. The sternum was divided.   The left internal mammary artery was harvested. Vein was harvested from the left lower extremity using endoscopic technique.. The patient was fully heparinized.The pericardium was opened and suspended from the free edges of the incision.  The patient was fully heparinized and adequate ACT was achieved. The left internal mammary was divided and the flow was excellent.. The vein was prepared to be used as  a bypass conduit. The aorta was cannulated using the Seldinger technique under transesophageal echocardiographic guidance. The right atrium was cannulated.  A cannula was inserted in the coronary sinus for retrograde cardioplegia.. The patient was placed on cardiopulmonary bypass and was cooled to 32 degrees Celsius.  A cardioplegia/vent cannula was inserted in the ascending aorta. The ascending aorta was cross-clamped and the heart was arrested with cold alf cardioplegia. Subsequently the obtuse marginal 2 artery was exposed. A 5 mm arteriotomy was done and the saphenous vein graft was anastomosed to the arteriotomy with 7-0 prolene in a continuous fashion  Subsequently the obtuse marginal 1 artery was exposed. A 5 mm arteriotomy was done and the saphenous vein graft was anastomosed to the arteriotomy with 7-0 prolene in a continuous fashion.  Subsequently the diagonal 1 coronary artery was identified and saphenous vein graft was used to bypass the artery.  Then the left anterior descending coronary artery was exposed.  A 5 mm arteriotomy was done and the left internal  mammary artery artery  was anastomosed to the arteriotomy with 7-0 prolene in a continuous fashion. Then, the aortic cross-clamp was removed.  A partially occluded clamp was applied in the ascending aorta and 3 openings were created.  The grafts were cut to the appropriate length and were anastomosed to the ascending aorta with 6-0 Prolene continuous fashion.  The partially occluded clamp was removed.  Pacemaker wires were inserted. When the patient was warmed up, the patient was taken off cardiopulmonary bypass without any difficulties. With stable hemodynamics  protamine was given and adequate hemostasis was achieved. The cannulae were removed and the cannulation sites were secured. Chest tubes were inserted. The chest was closed with interrupted stainless steel wires.  The flow through the grafts was excellent. The fascia was closed with  1-0 Vicryl and the subcutaneous tissue was closed with 2-0 Vicryl and the skin was closed with 3-0 Vicryl in a subcuticular fashion. The groin incision was closed with 2-0 Vicryl and 3-0 Vicryl respectively. The needle, sponge and instrument count was reported as correct. The patient was transferred to the Intensive Care Unit in stable condition. The first assistant Antonio Benavides assisted me in opening and closing the chest, connecting the patient to the heart-lung machine and completing all aspects of the operation. The physician assistant harvested the vein endoscopically. I ascertain that I was present and performed all aspects of the operation and I was available thereafter.     Estimated Blood Loss: 500 cc             Complications: None         Disposition: CTICU - hemodynamically stable.           Condition: stable    Larisa Slater MD  Phone Number: [phone]

## 2018-05-21 NOTE — PROGRESS NOTES
"Brief Nutrition Note    Recommendations   Change diet to cardiac, 2000cal  Offer education on follow up on stepdown unit  BG goals 110-150mg/dl     Nutrition Charting Type: Nutrition Brief Assessment    Clinical Course: Patient is a 77 y.o. male who was admitted on 5/18/2018 with a diagnosis of Chest pain [R07.9]  Chest pain [R07.9]  NSTEMI (non-ST elevated myocardial infarction) (CMS/HCC) ([facility]) [I21.4]  Chest pain [R07.9].     Past Medical History:   Diagnosis Date   • Coronary artery disease    • Diabetes mellitus type I (CMS/HCC) (HCC)    • Hypertension    • Hypothyroidism    • Incarcerated hernia    • Lipid disorder    • Myocardial infarction    • Nephrolithiasis      Past Surgical History:   Procedure Laterality Date   • ADENOIDECTOMY     • FRACTURE SURGERY     • SKIN BIOPSY     • SKIN CANCER EXCISION     • TONSILLECTOMY         General Information  Nutrition Evaluation/Patient Follow-Up: Mildly Nutritionally Compromised-Follow up in 4-6 days  Reason for Consult: Provider order  Patient Already Seen On: 05/19/18     Physical Findings  Additional Documentation:  (overwt, in chair )     Physical Appearance  Last Bowel Movement: 05/19/18     Nutrition Order Review  Nutrition Order Review: meets nutritional requirements  Nutrition Order Review Comments: cardiac, 2gm Na      Anthropometrics  Height: 160 cm (5' 3\")     Current Weight  Weight: 90.5 kg (199 lb 8.3 oz)     Ideal Body Weight (IBW)  Ideal Body Weight (IBW) (kg): 56.92  % Ideal Body Weight: 157.78     Body Mass Index (BMI)  BMI (Calculated): 35.4     Lab Results  Lab Results Reviewed:   BMP Results       05/21/18 05/20/18 05/20/18                    0304 2224 1759          - -         K 3.9 4.5 4.7          3.9           Cl 107 - -         CO2 23 - -         Glucose 153 (H) - -         BUN 28 (H) - -         Creatinine 1.4 (H) - -         Calcium 8.7 (L) - -         Anion Gap 9 - -         EGFR 48.9 (L) - -                     POC: 136- " [phone]       Pertinent Medications  Pertinent Medications Reviewed: lipitor, colchicine, colace, pepcid, lasix, flexpen, magox, synthroid, senokot   Epi, milrinone, precedex, levo, vaso      Skin: surgical incisions    Clinical comments: POD # 1 S/P CABG x4. Extubated last pm, remains on 3 vasopressors, precedex and milrinone. OOB in chair, tried a few bites of bkf. Needs consistent carb diet for BG control.     Goals: Pt to tolerate > 75% meals, BG goals 110-150mg/dl   Monitor: labs, skin, po diet yuval     Recommendations: See above       Date: 05/21/18  Signature: Lola Colon RD

## 2018-05-21 NOTE — CONSULTS
Darrell Cage D.O.  Endocrinology/Diabetes/Lipid Disorders Endocrinology Initial Consultation       CHIEF COMPLAINT   I was asked to see this patient in consultation by Dr. Slater for uncontrolled diabetes in the face of coronary artery disease, status post CABG.     HISTORY OF PRESENT ILLNESS      Maximus Dainel is a 77 y.o. male with a past medical history of uncontrolled type 2 diabetes and a known history of coronary artery disease who presented as a transfer from [facility] with chest pain.  He was admitted there on May 17 when he presented with abdominal pain and was found to have an incarcerated fat-containing hernia.  During that admission, he developed pressure-like chest pain and EKG changes showed ST segment elevations in leads II, III, aVF and V5 and V6.  He also had elevations in troponin levels and was placed on heparin and nitroglycerin drips.  He was transferred to [facility] where he underwent a left heart catheterization which revealed 80-90% proximal LAD lesions and a 70% mid LAD lesion, 80% stenosis of the superior branch of the obtuse marginal 1, 95% stenosis of the inferior branch of the obtuse marginal 1, nonobstructive disease in the RCA.  He was taken to the operating room on May 19 by Dr. Slater and he underwent a CABG ×4 with the LIMA to the LAD, vein graft to the diagonal, vein graft to the obtuse marginal 1, vein graft to the obtuse marginal 2.  He was transferred in stable condition on multiple drips including IV insulin.  He is now extubated in the CT ICU.  His 2 daughters are at his bedside.    The patient lives alone and has uncontrolled diabetes with a hemoglobin A1c of 10.5%.  He does not cook, he eats fast foods from [employer], and [employer].  His daughters tell me that he likely does not take all of his medicines because they found multiple bottles with his medicines almost full and the last time they were filled was early March.    PAST MEDICAL AND  SURGICAL HISTORY      Past Medical History:   Diagnosis Date   • Coronary artery disease    • Diabetes mellitus type I (CMS/HCC) (HCC)    • Hypertension    • Hypothyroidism    • Incarcerated hernia    • Lipid disorder    • Myocardial infarction    • Nephrolithiasis        Past Surgical History:   Procedure Laterality Date   • ADENOIDECTOMY     • FRACTURE SURGERY     • SKIN BIOPSY     • SKIN CANCER EXCISION     • TONSILLECTOMY         MEDICATIONS      Outpatient medications: Invokana 300 mg daily, metformin 500 mg twice daily, Lantus insulin 40 units in the morning, 45 units in the evening, levothyroxine 150 mcg daily (this dose was not clarified by the family) simvastatin 20 mg daily, lisinopril 5 mg daily.    Current Facility-Administered Medications   Medication Dose Route Frequency Provider Last Rate Last Dose   • acetaminophen (TYLENOL) tablet 650 mg  650 mg oral q4h PRN SLY Enriquez   650 mg at 05/21/18 1411   • albumin human 5 % solution 500 mL  500 mL intravenous PRN SLY Enriquez   500 mL at 05/21/18 0851   • albuterol nebulizer solution 2.5 mg  2.5 mg nebulization q4h KANDI SLY Santana   2.5 mg at 05/21/18 1458   • alum-mag hydroxide-simeth (MAALOX) 200-200-20 mg/5 mL suspension 30 mL  30 mL oral q4h PRN SLY Enriquez       • amiodarone in dextrose,iso-osm (NEXTERONE) 360 mg in 200 mL (1.8 mg/mL) infusion  1 mg/min intravenous Continuous SLY Santana 33.3 mL/hr at 05/21/18 1800 1 mg/min at 05/21/18 1800   • aspirin 325 mg tablet  - Pyxis Override Pull            • aspirin enteric coated tablet 81 mg  81 mg oral Daily Jeremy C Boxer, MD   81 mg at 05/21/18 0824   • atorvastatin (LIPITOR) tablet 80 mg  80 mg oral Nightly SLY Enriquez       • [START ON 5/23/2018] bisacodyl (DULCOLAX) 10 mg suppository 10 mg  10 mg rectal PRN SLY Enriquez       • calcium chloride 1 g in sodium chloride 0.9 % 50 mL IVPB  1 g intravenous q6h PRN SLY Enriquez        • chlorhexidine (PERIDEX) 0.12 % mouthwash 15 mL  15 mL Mouth/Throat BID SLY Enriquez   15 mL at 05/21/18 0824   • colchicine (COLCRYS) tablet 0.6 mg  0.6 mg oral Daily RALPH Childers   0.6 mg at 05/21/18 1142   • dexmedetomidine HCl (PRECEDEX) 400 mcg in sodium chloride 0.9 % 100 mL (4 mcg/mL) infusion  0.2-1.5 mcg/kg/hr intravenous Titrated SLY Enriquez 6.74 mL/hr at 05/21/18 1800 0.3 mcg/kg/hr at 05/21/18 1800   • DOBUTamine (DOBUTREX) infusion 500 mg/250 mL D5W (2,000 mcg/mL)  0.5-20 mcg/kg/min (Dosing Weight) intravenous Titrated SLY Santana 8.1 mL/hr at 05/21/18 1800 3 mcg/kg/min at 05/21/18 1800   • docusate sodium (COLACE) capsule 100 mg  100 mg oral BID SLY Enriquez       • epinephrine 4 mg/250 mL NSS (16 mcg/mL)  1 mcg/min intravenous Titrated SLY Santana 7.5 mL/hr at 05/21/18 1800 2 mcg/min at 05/21/18 1800   • famotidine (PEPCID) injection 20 mg  20 mg intravenous BID SLY Enriquez   20 mg at 05/20/18 2023    Or   • famotidine (PEPCID) tablet 20 mg  20 mg oral BID SLY Enriquez   20 mg at 05/21/18 0825   • furosemide (LASIX) tablet 40 mg  40 mg oral BID (9a, 4p) SLY Enriquez   40 mg at 05/21/18 0825    Followed by   • [START ON 5/24/2018] furosemide (LASIX) tablet 40 mg  40 mg oral Daily SLY Enriquez       • heparin (porcine) 1,000 unit/mL injection  - Pyxis Override Pull            • heparin (porcine) 5,000 unit/mL injection 5,000 Units  5,000 Units subcutaneous q8h [facility] RALPH Childers   5,000 Units at 05/21/18 1412   • oxyCODONE (ROXICODONE) immediate release tablet 5-10 mg  5-10 mg oral q4h PRN SLY Enriquez   5 mg at 05/21/18 1150    Or   • HYDROmorphone (DILAUDID) injection 0.25-0.5 mg  0.25-0.5 mg intravenous q3h PRN SLY Enriquez   0.5 mg at 05/21/18 1456   • insulin aspart U-100 (NovoLOG) pen 4 Units  4 Units subcutaneous TID with meals Darrell Cage DO   4 Units at 05/21/18  1656   • insulin regular (HumuLIN R,NovoLIN R) 100 Units in sodium chloride 0.9 % 100 mL (1 Units/mL) infusion  0-15 Units/hr intravenous Titrated SLY Enriquez 6.3 mL/hr at 05/21/18 1800 6.3 Units/hr at 05/21/18 1800   • levothyroxine (SYNTHROID) tablet 200 mcg  200 mcg oral Daily (6:30a) SLY Bernstein   200 mcg at 05/21/18 0632   • lidocaine PF (XYLOCAINE) 10 mg/mL (1 %) injection  - Pyxis Override Pull            • magnesium oxide (MAG-OX) tablet 400 mg  400 mg oral BID SLY Enriquez   400 mg at 05/21/18 0824   • magnesium sulfate in water IVPB premix 2 g  2 g intravenous Daily SLY Enriquez       • magnesium sulfate in water IVPB premix 2 g  2 g intravenous PRN SLY Enriquez   Stopped at 05/20/18 2223   • meperidine (DEMEROL) injection 12.5 mg  12.5 mg intravenous PRN SLY Enriquez       • metoprolol succinate XL (TOPROL-XL) 24 hr ER tablet 25 mg  25 mg oral Nightly SLY Enriquez        Or   • metoprolol succinate XL (TOPROL-XL) 24 hr ER tablet 50 mg  50 mg oral Nightly SLY Enriquez       • milrinone (PRIMACOR) infusion 40 mg/200 mL (premix)  0.25 mcg/kg/min intravenous Continuous SLY Enriquez   Stopped at 05/21/18 1700   • niCARdipine (CARDENE) 50 mg in sodium chloride 0.9 % 100 mL (0.5 mg/mL) infusion  2.5-15 mg/hr intravenous Titrated SLY Enriquez       • norepinephrine (LEVOPHED) 4 mg in sodium chloride 0.9 % 250 mL (0.016 mg/mL) infusion  0.5-30 mcg/min intravenous Titrated SLY Enriquez 15 mL/hr at 05/21/18 1800 4 mcg/min at 05/21/18 1800   • ondansetron ODT (ZOFRAN-ODT) disintegrating tablet 4 mg  4 mg oral q8h PRN SLY Enriquez        Or   • ondansetron (ZOFRAN) injection 4 mg  4 mg intravenous q8h PRN SLY Enriquez       • potassium chloride (KLOR-CON) tablet extended release 20 mEq  20 mEq oral BID SLY Santana       • potassium chloride 20 mEq in 100 mL IVPB  (premix)  20 mEq  intravenous q8h PRN SLY Enriquez   Stopped at 05/21/18 0604   • senna (SENOKOT) tablet 1 tablet  1 tablet oral BID SLY Enriquez       • vasopressin (PITRESSIN) 20 Units in sodium chloride 0.9 % 100 mL (0.2 Units/mL) infusion  0.04 Units/min intravenous Continuous SLY Enriquez   Stopped at 05/21/18 1700       ALLERGIES      Patient has no known allergies.    FAMILY HISTORY      Family History   Problem Relation Age of Onset   • Heart disease Mother    • Suicide Attempts Father    • Depression Father    • Heart disease Brother        SOCIAL HISTORY      Social History     Social History   • Marital status:      Spouse name: N/A   • Number of children: N/A   • Years of education: N/A     Occupational History   • retired     • retired papermill       Social History Main Topics   • Smoking status: Current Every Day Smoker     Packs/day: 0.25     Years: 65.00     Types: Cigarettes, Other (see comments)   • Smokeless tobacco: Never Used   • Alcohol use No   • Drug use: No   • Sexual activity: No     Other Topics Concern   • None     Social History Narrative    Lives alone in an apartment/efficiency.  Does have elevator.  Daughter is local and is power of        REVIEW OF SYSTEMS      All other systems reviewed and negative except as noted in HPI.  The patient has extreme polyuria, polydipsia and has had recent weight loss although he remains obese.  He has nocturia on at least 3-4 occasions per evening.  His daughter states that his apartment is extremely unkept although they tried to clean it weekly.    PHYSICAL EXAMINATION      Heart Rate:  [] 73  BP: (142)/(64) 142/64  FiO2 (%) (Set):  [40 %] 40 %  Body mass index is 35.34 kg/m².      Physical Exam:  General: non toxic, alert.  Unkept elderly man who looks older than his stated age.  Head: NC/AT  Eyes: EOMI, Sclera anicteric  Mouth: dry mucous membranes  Neck: supple, no palpable thyromegaly, no palpable thyroid  nodules or adenopathy,no carotid bruits  Cardiovascular: regular + S1/S2  Lungs: clear to auscultation  Abdomen: soft, NT/ND, + bowel sounds  Extremities: no clubbing, cyanosis, or edema.  Positive palpable DP pulses  Neurology: no focal deficits, moves all 4 extremities  Psych: Cantankerous elderly man who is cooperative on exam.  Skin: no rashes    LABS / IMAGING / EKG        Labs  Lab Results   Component Value Date    HGBA1C 10.5 (H) 05/19/2018     Lab Results   Component Value Date    WBC 7.05 05/21/2018    HGB 9.3 (L) 05/21/2018    HCT 29.4 (L) 05/21/2018     05/21/2018    CHOL 152 05/19/2018    TRIG 101 05/19/2018    HDL 29 (L) 05/19/2018    ALT 53 05/18/2018     (H) 05/18/2018     05/21/2018    K 4.5 05/21/2018     05/21/2018    CREATININE 1.4 (H) 05/21/2018    BUN 28 (H) 05/21/2018    CO2 23 05/21/2018    FREET4 1.54 05/19/2018       No results found for: TSH, [id number], W2WVJSR, THYROIDAB  All point-of-care blood sugars were reviewed by me.                Type 2 Diabetes uncontrolled on admission on multiple medications.  I had a long discussion with the patient and his daughters both together and separately from the patient, I did tell them that he is at high risk of having a sternal wound infection given the fact that he had surgery when his hemoglobin A1c was over 10%.  I did tell the patient that it would be for no reason that he underwent heart surgery if in fact he did not change his habits toward food, food portions and taking all of his medications for diabetes and blood pressure and cholesterol as prescribed.  Today he is postop day #1.  I will transition him off of IV insulin onto subcutaneous insulin on postop day #2.  I will leave those orders this evening.  Upon discharge, the patient will go home on basal bolus insulin using Lantus, once at bedtime, not in the morning and rapid acting insulin before meals.  If his creatinine remains stable at the time of discharge, on  the above-stated metformin.  I would not discharge the patient on Invokana.   Hyperlipidemia-the patient should be on high-dose atorvastatin and I would recommend using 80 mg daily.   Hypertension-currently controlled on medical therapy.  Final medication list for hypertension will be determined by CT surgery staff   Coronary Artery Disease- status post CABG ×4 with a previous history of coronary artery disease in the past   Hypothyroidism-the patient has a history of hypothyroidism.  The family at this point does not know what dose of thyroid medicine he is on.  He needs to have a TSH done during this admission.  CT surgery has him on 200 mcg daily in the orders however, coming out of the operating room report was given that the patient was on 150 mcg daily which I will change him to at this time.  It appears that the patient has not been caring for himself in his own apartment living alone.  I recommend that the patient should be transferred to rehabilitation facility with the plan of him being admitted to an assisted care living facility.  The patient's daughters will begin having this conversation with him during his hospital stay.       Darrell Cage DO  5/21/2018  6:13 PM

## 2018-05-21 NOTE — PLAN OF CARE
Problem: Patient Care Overview  Goal: Discharge Needs Assessment  Outcome: Ongoing (interventions implemented as appropriate)  Chart reviewed and discussed in rounds. Patient is POD #1 CABG X4. Extubated 5/20/18. Still receiving numerous gtts.Will probably require high flow O2. Awaiting PT evaluation for discharge recommendations. Met with patient and health care needs discussed. [facility] [place] is on the 2nd floor. Equipped with elevator. Daughter lives locally and will assist with needs at home. PTA, independent with all ADLs. No DME at home. Never had home care.Has Rx coverage with [facility]. Rx cost $8 or $15 each. Specific d/c needs undetermined. If appropriate for home. Patient is agreeable to VN and  Pt. Offered choice of home care. Selects [facility]. Referral placed into [facility]. Business card provided.Will follow to assist with transitions of care. Pg # [phone]   05/21/18 1416   [place] Needs Assessment   Concerns To Be Addressed care coordination/care conferences   Readmission Within The Last 30 Days no previous admission in last 30 days   Provider Choice List(s) Given yes  (selects [facility])   Anticipated Discharge Disposition home with home health services   Type of Home Care Services home PT;nursing   Community Agency Name(s) [facility]   Equipment Needed After Discharge other (see comments)  (to be determined)   Activity/Self Care ROS   Equipment Currently Used at Home none

## 2018-05-22 ENCOUNTER — APPOINTMENT (INPATIENT)
Dept: PHYSICAL THERAPY | Facility: HOSPITAL | Age: 78
DRG: 234 | End: 2018-05-22
Payer: MEDICARE

## 2018-05-22 ENCOUNTER — APPOINTMENT (INPATIENT)
Dept: RADIOLOGY | Facility: HOSPITAL | Age: 78
DRG: 234 | End: 2018-05-22
Attending: PHYSICIAN ASSISTANT
Payer: MEDICARE

## 2018-05-22 LAB
ANION GAP SERPL CALC-SCNC: 11 MEQ/L (ref 3–15)
ATRIAL RATE: 68
BASE EXCESS BLDA CALC-SCNC: -0.4 MMOL/L
BASE EXCESS BLDA CALC-SCNC: -0.9 MMOL/L
BASE EXCESS BLDA CALC-SCNC: 2.1 MMOL/L
BUN SERPL-MCNC: 29 MG/DL (ref 8–20)
CA-I BLD-SCNC: 1.14 MMOL/L (ref 1.15–1.27)
CA-I BLD-SCNC: 1.2 MMOL/L (ref 1.15–1.27)
CA-I BLD-SCNC: 1.2 MMOL/L (ref 1.15–1.27)
CA-I BLD-SCNC: 1.23 MMOL/L (ref 1.15–1.27)
CALCIUM SERPL-MCNC: 8.9 MG/DL (ref 8.9–10.3)
CHLORIDE SERPL-SCNC: 103 MMOL/L (ref 98–109)
CO2 BLDA-SCNC: 25 MMOL/L (ref 22–32)
CO2 BLDA-SCNC: 25 MMOL/L (ref 22–32)
CO2 BLDA-SCNC: 26 MMOL/L (ref 22–32)
CO2 SERPL-SCNC: 21 MMOL/L (ref 22–32)
CREAT SERPL-MCNC: 1.3 MG/DL (ref 0.8–1.3)
CROSSMATCH: NORMAL
ERYTHROCYTE [DISTWIDTH] IN BLOOD BY AUTOMATED COUNT: 16.6 % (ref 11.6–14.4)
GFR SERPL CREATININE-BSD FRML MDRD: 53.3 ML/MIN/1.73M*2
GLUCOSE BLD-MCNC: 109 MG/DL (ref 70–99)
GLUCOSE BLD-MCNC: 124 MG/DL (ref 70–99)
GLUCOSE BLD-MCNC: 133 MG/DL (ref 70–99)
GLUCOSE BLD-MCNC: 149 MG/DL (ref 70–99)
GLUCOSE BLD-MCNC: 153 MG/DL (ref 70–99)
GLUCOSE BLDA-MCNC: 126 MG/DL (ref 65–95)
GLUCOSE BLDA-MCNC: 130 MG/DL (ref 65–95)
GLUCOSE BLDA-MCNC: 151 MG/DL (ref 65–95)
GLUCOSE SERPL-MCNC: 147 MG/DL (ref 70–99)
HCO3 BLDA-SCNC: 24 MMOL/L (ref 21–28)
HCO3 BLDA-SCNC: 24 MMOL/L (ref 21–28)
HCO3 BLDA-SCNC: 25 MMOL/L (ref 21–28)
HCT VFR BLDA CALC: 31 % (ref 36–48)
HCT VFR BLDA CALC: 33 % (ref 36–48)
HCT VFR BLDA CALC: 33 % (ref 36–48)
HCT VFR BLDCO AUTO: 29.4 % (ref 40–51)
HGB BLD-MCNC: 9.4 G/DL (ref 13.7–17.5)
ISBT CODE: 6200
ISBT CODE: 8400
ISBT CODE: 9500
LACTATE BLDA-SCNC: 1.1 MMOL/L (ref 0.4–1.6)
LACTATE BLDA-SCNC: 1.3 MMOL/L (ref 0.4–1.6)
LACTATE BLDA-SCNC: 1.6 MMOL/L (ref 0.4–1.6)
MAGNESIUM SERPL-MCNC: 1.8 MG/DL (ref 1.8–2.5)
MAGNESIUM SERPL-MCNC: 2 MG/DL (ref 1.8–2.5)
MCH RBC QN AUTO: 26.6 PG (ref 28–33.2)
MCHC RBC AUTO-ENTMCNC: 32 G/DL (ref 32.2–36.5)
MCV RBC AUTO: 83.3 FL (ref 83–98)
P AXIS: 43
PCO2 BLDA: 32 MMHG (ref 35–48)
PCO2 BLDA: 37 MMHG (ref 35–48)
PCO2 BLDA: 38 MMHG (ref 35–48)
PDW BLD AUTO: 10.5 FL (ref 9.4–12.4)
PH BLDA: 7.41 PH (ref 7.35–7.45)
PH BLDA: 7.41 PH (ref 7.35–7.45)
PH BLDA: 7.5 PH (ref 7.35–7.45)
PHOSPHATE SERPL-MCNC: 2.9 MG/DL (ref 2.4–4.7)
PLATELET # BLD AUTO: 209 K/UL (ref 150–350)
PO2 BLDA: 104 MMHG (ref 60–70)
PO2 BLDA: 73 MMHG (ref 60–70)
PO2 BLDA: 90 MMHG (ref 60–70)
POCT PATIENT TEMPERATURE: 98.6 F (ref 97–99)
POTASSIUM BLDA-SCNC: 3.8 MEQ/L (ref 3.4–4.5)
POTASSIUM BLDA-SCNC: 4.1 MEQ/L (ref 3.4–4.5)
POTASSIUM BLDA-SCNC: 4.4 MEQ/L (ref 3.4–4.5)
POTASSIUM SERPL-SCNC: 4.1 MMOL/L (ref 3.6–5.1)
POTASSIUM SERPL-SCNC: 4.4 MMOL/L (ref 3.6–5.1)
PR INTERVAL: 198
PRODUCT CODE: NORMAL
PRODUCT STATUS: NORMAL
QRS DURATION: 78
QT INTERVAL: 424
QTC CALCULATION(BAZETT): 450
R AXIS: 81
RBC # BLD AUTO: 3.53 M/UL (ref 4.5–5.8)
SAO2 % BLDA: 95 % (ref 93–98)
SAO2 % BLDA: 98 % (ref 93–98)
SAO2 % BLDA: 98 % (ref 93–98)
SAO2 % BLDV: 58.4 % (ref 30–60)
SAO2 % BLDV: 66.3 % (ref 30–60)
SAO2 % BLDV: 69.4 % (ref 30–60)
SODIUM BLDA-SCNC: 133 MEQ/L (ref 136–145)
SODIUM BLDA-SCNC: 134 MEQ/L (ref 136–145)
SODIUM BLDA-SCNC: 135 MEQ/L (ref 136–145)
SODIUM SERPL-SCNC: 135 MMOL/L (ref 136–144)
SPECIMEN EXP DATE BLD: NORMAL
T WAVE AXIS: 62
UNIT ABO: NORMAL
UNIT ID: NORMAL
UNIT RH: NEGATIVE
UNIT RH: POSITIVE
VENTRICULAR RATE: 68
WBC # BLD AUTO: 7.91 K/UL (ref 3.8–10.5)

## 2018-05-22 PROCEDURE — 63700000 HC SELF-ADMINISTRABLE DRUG: Performed by: STUDENT IN AN ORGANIZED HEALTH CARE EDUCATION/TRAINING PROGRAM

## 2018-05-22 PROCEDURE — 84100 ASSAY OF PHOSPHORUS: CPT | Performed by: PHYSICIAN ASSISTANT

## 2018-05-22 PROCEDURE — 82810 BLOOD GASES O2 SAT ONLY: CPT | Performed by: PHYSICIAN ASSISTANT

## 2018-05-22 PROCEDURE — 99233 SBSQ HOSP IP/OBS HIGH 50: CPT | Performed by: INTERNAL MEDICINE

## 2018-05-22 PROCEDURE — 97530 THERAPEUTIC ACTIVITIES: CPT | Mod: GP

## 2018-05-22 PROCEDURE — 82330 ASSAY OF CALCIUM: CPT | Performed by: PHYSICIAN ASSISTANT

## 2018-05-22 PROCEDURE — 83735 ASSAY OF MAGNESIUM: CPT | Performed by: PHYSICIAN ASSISTANT

## 2018-05-22 PROCEDURE — 94640 AIRWAY INHALATION TREATMENT: CPT

## 2018-05-22 PROCEDURE — 84132 ASSAY OF SERUM POTASSIUM: CPT | Performed by: PHYSICIAN ASSISTANT

## 2018-05-22 PROCEDURE — 63700000 HC SELF-ADMINISTRABLE DRUG: Performed by: PHYSICIAN ASSISTANT

## 2018-05-22 PROCEDURE — 63600000 HC DRUGS/DETAIL CODE: Performed by: INTERNAL MEDICINE

## 2018-05-22 PROCEDURE — 63600000 HC DRUGS/DETAIL CODE: Performed by: NURSE PRACTITIONER

## 2018-05-22 PROCEDURE — 63700000 HC SELF-ADMINISTRABLE DRUG: Performed by: INTERNAL MEDICINE

## 2018-05-22 PROCEDURE — 71045 X-RAY EXAM CHEST 1 VIEW: CPT

## 2018-05-22 PROCEDURE — 63600000 HC DRUGS/DETAIL CODE

## 2018-05-22 PROCEDURE — 25800000 HC PHARMACY IV SOLUTIONS: Performed by: PHYSICIAN ASSISTANT

## 2018-05-22 PROCEDURE — 63600000 HC DRUGS/DETAIL CODE: Performed by: PHYSICIAN ASSISTANT

## 2018-05-22 PROCEDURE — 80048 BASIC METABOLIC PNL TOTAL CA: CPT | Performed by: PHYSICIAN ASSISTANT

## 2018-05-22 PROCEDURE — 20000000 HC ROOM AND CARE ICU

## 2018-05-22 PROCEDURE — 25000000 HC PHARMACY GENERAL: Performed by: PHYSICIAN ASSISTANT

## 2018-05-22 PROCEDURE — 99291 CRITICAL CARE FIRST HOUR: CPT | Performed by: ANESTHESIOLOGY

## 2018-05-22 PROCEDURE — 93010 ELECTROCARDIOGRAM REPORT: CPT | Performed by: INTERNAL MEDICINE

## 2018-05-22 PROCEDURE — 63700000 HC SELF-ADMINISTRABLE DRUG: Performed by: NURSE PRACTITIONER

## 2018-05-22 PROCEDURE — 85027 COMPLETE CBC AUTOMATED: CPT | Performed by: PHYSICIAN ASSISTANT

## 2018-05-22 PROCEDURE — 36415 COLL VENOUS BLD VENIPUNCTURE: CPT | Performed by: PHYSICIAN ASSISTANT

## 2018-05-22 RX ORDER — OXYCODONE HYDROCHLORIDE 5 MG/1
5 TABLET ORAL ONCE
Status: COMPLETED | OUTPATIENT
Start: 2018-05-22 | End: 2018-05-22

## 2018-05-22 RX ORDER — FUROSEMIDE 10 MG/ML
INJECTION INTRAMUSCULAR; INTRAVENOUS
Status: COMPLETED
Start: 2018-05-22 | End: 2018-05-22

## 2018-05-22 RX ORDER — FUROSEMIDE 10 MG/ML
40 INJECTION INTRAMUSCULAR; INTRAVENOUS ONCE
Status: COMPLETED | OUTPATIENT
Start: 2018-05-22 | End: 2018-05-22

## 2018-05-22 RX ADMIN — ACETAMINOPHEN 650 MG: 325 TABLET, FILM COATED ORAL at 21:27

## 2018-05-22 RX ADMIN — INSULIN HUMAN 15 UNITS: 100 INJECTION, SUSPENSION SUBCUTANEOUS at 08:32

## 2018-05-22 RX ADMIN — OXYCODONE HYDROCHLORIDE 10 MG: 5 TABLET ORAL at 22:23

## 2018-05-22 RX ADMIN — ALBUTEROL SULFATE 2.5 MG: 2.5 SOLUTION RESPIRATORY (INHALATION) at 07:53

## 2018-05-22 RX ADMIN — MAGNESIUM SULFATE HEPTAHYDRATE 2 G: 40 INJECTION, SOLUTION INTRAVENOUS at 04:19

## 2018-05-22 RX ADMIN — INSULIN GLARGINE 20 UNITS: 100 INJECTION, SOLUTION SUBCUTANEOUS at 21:32

## 2018-05-22 RX ADMIN — ACETAMINOPHEN 650 MG: 325 TABLET, FILM COATED ORAL at 15:35

## 2018-05-22 RX ADMIN — ALBUTEROL SULFATE 2.5 MG: 2.5 SOLUTION RESPIRATORY (INHALATION) at 15:18

## 2018-05-22 RX ADMIN — POTASSIUM CHLORIDE 20 MEQ: 750 TABLET, FILM COATED, EXTENDED RELEASE ORAL at 08:30

## 2018-05-22 RX ADMIN — MAGNESIUM OXIDE TAB 400 MG (240 MG ELEMENTAL MG) 400 MG: 400 (240 MG) TAB at 21:26

## 2018-05-22 RX ADMIN — INSULIN ASPART 6 UNITS: 100 INJECTION, SOLUTION INTRAVENOUS; SUBCUTANEOUS at 13:24

## 2018-05-22 RX ADMIN — ACETAMINOPHEN 650 MG: 325 TABLET, FILM COATED ORAL at 08:33

## 2018-05-22 RX ADMIN — OXYCODONE HYDROCHLORIDE 5 MG: 5 TABLET ORAL at 04:50

## 2018-05-22 RX ADMIN — SENNOSIDES 1 TABLET: 8.6 TABLET, FILM COATED ORAL at 21:27

## 2018-05-22 RX ADMIN — CHLORHEXIDINE GLUCONATE 15 ML: 1.2 RINSE ORAL at 08:31

## 2018-05-22 RX ADMIN — ASPIRIN 81 MG: 81 TABLET, DELAYED RELEASE ORAL at 08:29

## 2018-05-22 RX ADMIN — HYDROMORPHONE HYDROCHLORIDE 0.5 MG: 1 INJECTION, SOLUTION INTRAMUSCULAR; INTRAVENOUS; SUBCUTANEOUS at 14:51

## 2018-05-22 RX ADMIN — HEPARIN SODIUM 5000 UNITS: 5000 INJECTION INTRAVENOUS; SUBCUTANEOUS at 13:30

## 2018-05-22 RX ADMIN — FUROSEMIDE 40 MG: 40 TABLET ORAL at 08:30

## 2018-05-22 RX ADMIN — CHLORHEXIDINE GLUCONATE 15 ML: 1.2 RINSE ORAL at 21:27

## 2018-05-22 RX ADMIN — HYDROMORPHONE HYDROCHLORIDE 0.5 MG: 1 INJECTION, SOLUTION INTRAMUSCULAR; INTRAVENOUS; SUBCUTANEOUS at 03:12

## 2018-05-22 RX ADMIN — DOCUSATE SODIUM 100 MG: 100 CAPSULE, LIQUID FILLED ORAL at 08:30

## 2018-05-22 RX ADMIN — MAGNESIUM OXIDE TAB 400 MG (240 MG ELEMENTAL MG) 400 MG: 400 (240 MG) TAB at 08:30

## 2018-05-22 RX ADMIN — HYDROMORPHONE HYDROCHLORIDE 0.5 MG: 1 INJECTION, SOLUTION INTRAMUSCULAR; INTRAVENOUS; SUBCUTANEOUS at 04:26

## 2018-05-22 RX ADMIN — POTASSIUM CHLORIDE 20 MEQ: 750 TABLET, FILM COATED, EXTENDED RELEASE ORAL at 21:27

## 2018-05-22 RX ADMIN — DEXMEDETOMIDINE HYDROCHLORIDE 0.3 MCG/KG/HR: 100 INJECTION, SOLUTION INTRAVENOUS at 01:56

## 2018-05-22 RX ADMIN — HEPARIN SODIUM 5000 UNITS: 5000 INJECTION INTRAVENOUS; SUBCUTANEOUS at 05:55

## 2018-05-22 RX ADMIN — FAMOTIDINE 20 MG: 20 TABLET ORAL at 21:26

## 2018-05-22 RX ADMIN — HYDROMORPHONE HYDROCHLORIDE 0.5 MG: 1 INJECTION, SOLUTION INTRAMUSCULAR; INTRAVENOUS; SUBCUTANEOUS at 20:33

## 2018-05-22 RX ADMIN — ALBUTEROL SULFATE 2.5 MG: 2.5 SOLUTION RESPIRATORY (INHALATION) at 11:28

## 2018-05-22 RX ADMIN — POTASSIUM CHLORIDE 20 MEQ: 200 INJECTION, SOLUTION INTRAVENOUS at 13:00

## 2018-05-22 RX ADMIN — HEPARIN SODIUM 5000 UNITS: 5000 INJECTION INTRAVENOUS; SUBCUTANEOUS at 21:26

## 2018-05-22 RX ADMIN — HYDROMORPHONE HYDROCHLORIDE 0.5 MG: 1 INJECTION, SOLUTION INTRAMUSCULAR; INTRAVENOUS; SUBCUTANEOUS at 05:56

## 2018-05-22 RX ADMIN — ALBUTEROL SULFATE 2.5 MG: 2.5 SOLUTION RESPIRATORY (INHALATION) at 03:15

## 2018-05-22 RX ADMIN — HYDROMORPHONE HYDROCHLORIDE 0.5 MG: 1 INJECTION, SOLUTION INTRAMUSCULAR; INTRAVENOUS; SUBCUTANEOUS at 01:47

## 2018-05-22 RX ADMIN — AMIODARONE HYDROCHLORIDE 400 MG: 200 TABLET ORAL at 21:26

## 2018-05-22 RX ADMIN — ALBUTEROL SULFATE 2.5 MG: 2.5 SOLUTION RESPIRATORY (INHALATION) at 22:28

## 2018-05-22 RX ADMIN — ALBUTEROL SULFATE 2.5 MG: 2.5 SOLUTION RESPIRATORY (INHALATION) at 19:35

## 2018-05-22 RX ADMIN — AMIODARONE HYDROCHLORIDE 400 MG: 200 TABLET ORAL at 08:30

## 2018-05-22 RX ADMIN — INSULIN ASPART 7 UNITS: 100 INJECTION, SOLUTION INTRAVENOUS; SUBCUTANEOUS at 08:31

## 2018-05-22 RX ADMIN — COLCHICINE 0.6 MG: 0.6 TABLET, FILM COATED ORAL at 08:29

## 2018-05-22 RX ADMIN — FUROSEMIDE 40 MG: 10 INJECTION, SOLUTION INTRAMUSCULAR; INTRAVENOUS at 16:21

## 2018-05-22 RX ADMIN — ATORVASTATIN CALCIUM 80 MG: 80 TABLET, FILM COATED ORAL at 21:26

## 2018-05-22 RX ADMIN — FUROSEMIDE 40 MG: 10 INJECTION INTRAMUSCULAR; INTRAVENOUS at 16:21

## 2018-05-22 RX ADMIN — FAMOTIDINE 20 MG: 20 TABLET ORAL at 08:30

## 2018-05-22 RX ADMIN — MAGNESIUM SULFATE HEPTAHYDRATE 2 G: 40 INJECTION, SOLUTION INTRAVENOUS at 15:35

## 2018-05-22 RX ADMIN — SENNOSIDES 1 TABLET: 8.6 TABLET, FILM COATED ORAL at 08:30

## 2018-05-22 RX ADMIN — LEVOTHYROXINE SODIUM 150 MCG: 150 TABLET ORAL at 06:28

## 2018-05-22 RX ADMIN — DOCUSATE SODIUM 100 MG: 100 CAPSULE, LIQUID FILLED ORAL at 21:27

## 2018-05-22 RX ADMIN — AMIODARONE HYDROCHLORIDE 400 MG: 200 TABLET ORAL at 13:30

## 2018-05-22 RX ADMIN — OXYCODONE HYDROCHLORIDE 10 MG: 5 TABLET ORAL at 18:17

## 2018-05-22 ASSESSMENT — ENCOUNTER SYMPTOMS
PALPITATIONS: 0
NAUSEA: 0
EYE PAIN: 0
SHORTNESS OF BREATH: 0
DIZZINESS: 0
CHILLS: 0
FEVER: 0
ALTERED MENTAL STATUS: 0
NEAR-SYNCOPE: 0
COUGH: 0
VOMITING: 0
HEADACHES: 0
LIGHT-HEADEDNESS: 0

## 2018-05-22 ASSESSMENT — COGNITIVE AND FUNCTIONAL STATUS - GENERAL
STANDING UP FROM CHAIR USING ARMS: 3 - A LITTLE
WALKING IN HOSPITAL ROOM: 3 - A LITTLE
CLIMB 3 TO 5 STEPS WITH RAILING: 2 - A LOT
MOVING TO AND FROM BED TO CHAIR: 3 - A LITTLE

## 2018-05-22 NOTE — PROGRESS NOTES
Cardiothoracic Intensivist Progress Note       CARDIOTHORACIC   Post-Operative Day # 2     Subjective     History of Present Illness: Maximus Daniel is a 77 y.o. cisgender male with history of known coronary artery disease prior to OR s/p CABG, chronic HTN, and hyperlipidemia that are being treated.                              Review of Systems:                                     Constitutional: no acute distress                                                  Eyes: denies difficulty with vision  Ears, nose, mouth, throat, and face: denies oral discomfort                                        Respiratory: denies shortness of breath                                  Cardiovascular: mild chest discomfort at incision site                                  Gastrointestinal: denies abdominal pain                                    Genitourinary: denies difficulty voiding                                      Hematologic: denies bleeding issues                                Musculoskeletal: denies muscle pain                                      Neurological: generalized weakness                                   Integumentary: denies rash      Medical History:   Past Medical History:   Diagnosis Date   • Coronary artery disease    • Diabetes mellitus type I (CMS/HCC) (HCC)    • Hypertension    • Hypothyroidism    • Incarcerated hernia    • Lipid disorder    • Myocardial infarction    • Nephrolithiasis        Surgical History:   Past Surgical History:   Procedure Laterality Date   • ADENOIDECTOMY     • FRACTURE SURGERY     • SKIN BIOPSY     • SKIN CANCER EXCISION     • TONSILLECTOMY         Allergies: Patient has no known allergies.    Social History:   Social History     Social History   • Marital status:      Spouse name: N/A   • Number of children: N/A   • Years of education: N/A     Occupational History   • retired     • retired papermill       Social History Main Topics   • Smoking status: Current  Every Day Smoker     Packs/day: 0.25     Years: 65.00     Types: Cigarettes, Other (see comments)   • Smokeless tobacco: Never Used   • Alcohol use No   • Drug use: No   • Sexual activity: No     Other Topics Concern   • None     Social History Narrative    Lives alone in an apartment/efficiency.  Does have elevator.  Daughter is local and is power of        Family History:   Family History   Problem Relation Age of Onset   • Heart disease Mother    • Suicide Attempts Father    • Depression Father    • Heart disease Brother        Objective     Vital signs in last 24 hours:  Heart Rate:  [] 101  BP: (142)/(64) 142/64  FiO2 (%) (Set):  [100 %] 100 %      Intake/Output Summary (Last 24 hours) at 05/22/18 1028  Last data filed at 05/22/18 1000   Gross per 24 hour   Intake          2172.81 ml   Output             2650 ml   Net          -477.19 ml     Intake/Output this shift:  I/O this shift:  In: 148.3 [I.V.:148.3]  Out: 250 [Urine:250]    Labs  Lab Results   Component Value Date    WBC 7.91 05/22/2018    HGB 9.4 (L) 05/22/2018    HCT 29.4 (L) 05/22/2018     05/22/2018    CHOL 152 05/19/2018    TRIG 101 05/19/2018    HDL 29 (L) 05/19/2018    ALT 53 05/18/2018     (H) 05/18/2018     (L) 05/22/2018    K 4.4 05/22/2018     05/22/2018    CREATININE 1.3 05/22/2018    BUN 29 (H) 05/22/2018    CO2 21 (L) 05/22/2018    INR 1.6 05/20/2018    HGBA1C 10.5 (H) 05/19/2018       Full Code    Head/Ear/Nose/Throat:     NCAT.                               Eyes:     EOMI and PERRL.                     Respiratory:     diminished at the bases b/l.               Cardiovascular:     RRR and normal S1, S2.              Gastrointestinal:     + Bowel sounds and non-tender.                 Genitourinary:     medrano in place.                    Extremities:     trace edema b/l lower extremities.            Musculoskeletal:      No injury or deformity.                   Neurological:     no focal  "deficits.       Behavior/Emotional:     appropriate and cooperative.                           Lymph:      No adenopathy noted.                               Skin:      Clean, dry and intact.     Examination of the patient performed at the bedside. Rounded with ICU team and discussed management/plan with surgical staff. Medications, radiological studies and labs reviewed and discussed with surgeon of record, Dr. Jeff Slater.    Cardiothoracic Assessment and Plan:    Neurologic: A&Ox3 and pain controlled. Will continue to optimize multi-modal pain regimen.     Cardiovascular: Severe CAD s/p CABGx4. Still on pressors for cardiac and vascular support. Hypotension could be related to vasoplegia from questionable lack of ACEi cessation pre-op vs hypovolemia. Responded well to dobutamine. Will resuscitate, wean pressor and continue to optimize cardiac medications.     Respiratory: Still on HFNC with poor Po2. Likely secondary to chronic smoking history. I personally reviewed the CXR which again portrayed small lung volumes b/l with atelectasis. Will encourage IS, mobilization and wean O2 PRN.      Genitourinary: CKD stage 3. D/C medrano. Making adequate urine output. Will avoid nephrotoxic medications.     Endocrine: Follow FSBG. Hypothryoidism and DM management.     Hematologic: No evidence active bleeding. SQH for DVT ppx. \"Acute (expected) blood loss anemia from surgery.      Infectious Disease: No indication for antibiotics at this time.      Fluids, Electrolytes: Lytes replaced per protocol.     Gastrointestinal: PO as tolerated. GI ppx.     Skin: OOB, Pt.      Tubes, lines and drains: Will remove superfluous invasive monitors PRN.    Disposition: Critically ill.  Pressor support s/p major invasive cardiac surgery. Continue ICU care.     I personally spent 35 minutes of critical care time with this patient not including procedure time.          Brandon Arambula, DO  5/22/2018           "

## 2018-05-22 NOTE — PROGRESS NOTES
Assessment/Plan       1)  STEMI (ST elevation myocardial infarction) (CMS/[facility]) ([facility])  - patient is now post op day 2 CABG x 4 with LIMA- LAD, SVG - Diag, SVG - Om1, SVG - Om2  - he is currently doing well, extubated, has chest tube, still on minimal ionotropic support with dobutamine  - wean inotropes/pressors per primary team  - amio TID with decrease to 200mg daily at discharge  - continue lasix - would use IV 40 1-2 times daily to optimize volume and reduce pulmonary edema on cxr  - currently on asa, consider plavix  - start low dose metoprolol succinate when dobutamine discontinued    Subjective   Interval History: none.     Review of Systems   Constitution: Negative for chills and fever.   Eyes: Negative for pain.   Cardiovascular: Negative for chest pain, leg swelling, near-syncope and palpitations.   Respiratory: Negative for cough and shortness of breath.    Gastrointestinal: Negative for nausea and vomiting.   Neurological: Negative for dizziness, headaches and light-headedness.   Psychiatric/Behavioral: Negative for altered mental status.       Scheduled Meds:    albuterol 2.5 mg nebulization q4h [facility]   amiodarone 400 mg oral TID   aspirin      aspirin 81 mg oral Daily   atorvastatin 80 mg oral Nightly   chlorhexidine 15 mL Mouth/Throat BID   colchicine 0.6 mg oral Daily   docusate sodium 100 mg oral BID   famotidine 20 mg intravenous BID   Or      famotidine 20 mg oral BID   furosemide 40 mg oral BID (9a, 4p)   Followed by      [START ON 5/24/2018] furosemide 40 mg oral Daily   heparin (porcine)      heparin (porcine) 5,000 Units subcutaneous q8h [facility]   insulin aspart U-100 3-5 Units subcutaneous With meals & nightly   insulin aspart U-100 6 Units subcutaneous TID with meals   insulin glargine 20 Units subcutaneous Nightly   levothyroxine 150 mcg oral Daily (6:30a)   lidocaine PF      magnesium oxide 400 mg oral BID   magnesium sulfate 2 g intravenous Daily   metoprolol succinate XL 25 mg oral Nightly   Or       metoprolol succinate XL 50 mg oral Nightly   potassium chloride 20 mEq oral BID   senna 1 tablet oral BID     Continuous Infusions:    amiodarone (CORDARONE) IV infusion 1 mg/min Last Rate: Stopped (05/21/18 2200)   dexmedetomidine in 0.9 % NaCl 0.2-1.5 mcg/kg/hr Last Rate: Stopped (05/22/18 0418)   DOBUTamine 0.5-20 mcg/kg/min (Dosing Weight) Last Rate: 3 mcg/kg/min (05/22/18 1100)   EPINEPHrine 1 mcg/min Last Rate: Stopped (05/22/18 1100)   insulin 0-15 Units/hr Last Rate: Stopped (05/22/18 1000)   milrinone 0.25 mcg/kg/min Last Rate: Stopped (05/21/18 1700)   niCARdipine (CARDENE) infusion 2.5-15 mg/hr    norepinephrine 0.5-30 mcg/min Last Rate: Stopped (05/22/18 0418)   vasopressin (PITRESSIN) infusion 0.04 Units/min Last Rate: Stopped (05/21/18 1700)     PRN Meds:.•  acetaminophen  •  albumin human  •  alum-mag hydroxide-simeth  •  [START ON 5/23/2018] bisacodyl  •  calcium chloride  •  oxyCODONE **OR** HYDROmorphone  •  magnesium sulfate  •  ondansetron ODT **OR** ondansetron  •  potassium chloride    Objective     Vital signs in last 24 hours:    Vitals:    05/22/18 0943 05/22/18 1000 05/22/18 1100 05/22/18 1200   BP: (!) 151/69      Pulse: (!) 104 91 91 90   Resp:       Temp:       TempSrc:       SpO2: 94% 98% 95% 100%   Weight:       Height:             Intake/Output Summary (Last 24 hours) at 05/22/18 1227  Last data filed at 05/22/18 1100   Gross per 24 hour   Intake          2066.71 ml   Output             2625 ml   Net          -558.29 ml       Weights (last 7 days)     Date/Time   Weight   Drug Calculation Weight (Dosing Weight)    05/22/18 0600  92.6 kg (204 lb 2.3 oz)  --    05/21/18 0600  90.5 kg (199 lb 8.3 oz)  --    05/20/18 1350  --  90 kg (198 lb 6.6 oz)    05/20/18 1342  90 kg (198 lb 6.6 oz)  --    05/19/18 0845  89.8 kg (198 lb)  --    05/18/18 1717  89.8 kg (198 lb)  --                Physical Exam   Constitutional: He is oriented to person, place, and time. He appears well-developed  and well-nourished.   HENT:   Head: Normocephalic and atraumatic.   Eyes: Conjunctivae and EOM are normal. Pupils are equal, round, and reactive to light.   Neck: Normal range of motion. Neck supple. No JVD present.   Cardiovascular: Normal rate and regular rhythm.  Exam reveals friction rub.    Pulmonary/Chest: Effort normal. No respiratory distress. He has no wheezes. He has rales.   Abdominal: Soft. Bowel sounds are normal. He exhibits no distension. There is no tenderness. There is no rebound.   Musculoskeletal: Normal range of motion. He exhibits no edema.   Neurological: He is alert and oriented to person, place, and time.   Skin: Skin is warm and dry.   Psychiatric: He has a normal mood and affect. His behavior is normal. Judgment and thought content normal.       Labs    CBC Results       05/22/18 05/21/18 05/21/18                    0309 1212 0304         WBC 7.91 7.05 6.23         RBC 3.53 (L) 3.47 (L) 3.59 (L)         HGB 9.4 (L) 9.3 (L) 9.6 (L)         HCT 29.4 (L) 29.4 (L) 29.7 (L)         MCV 83.3 84.7 82.7 (L)         MCH 26.6 (L) 26.8 (L) 26.7 (L)         MCHC 32.0 (L) 31.6 (L) 32.3          203 202                       CMP Results       05/22/18 05/21/18 05/21/18                    0309 1140 0304          (L) - 139         K 4.4 4.5 3.9            3.9         Cl 103 - 107         CO2 21 (L) - 23         Glucose 147 (H) - 153 (H)         BUN 29 (H) - 28 (H)         Creatinine 1.3 - 1.4 (H)         Calcium 8.9 - 8.7 (L)         Anion Gap 11 - 9         EGFR 53.3 (L) - 48.9 (L)         Comment for K at 1140 on 05/21/18:    SLIGHT HEMOLYSIS, RESULT MAY BE INCREASED.            PT PTT Results       05/20/18 05/20/18 05/19/18                    1357 1209 2350         PT 19.1 (H) 20.6 (H) -         INR 1.6 1.7 -         PTT 30 30 48 (H)         Comment for INR at 1357 on 05/20/18:    Moderate Intensity Anticoagulation = 2.0 to 3.0, High Intensity = 2.5 to 3.5    Comment for INR at 1209 on  05/20/18:    Moderate Intensity Anticoagulation = 2.0 to 3.0, High Intensity = 2.5 to 3.5    Comment for PTT at 2350 on 05/19/18:    The Standard Therapeutic Range for Heparin is 68 to 101 seconds.          Troponin I Results       05/20/18 05/19/18 05/19/18                    0528 2350 1549         Troponin I 13.14 (HH) 18.10 (HH) 19.38 (HH)         Comment for Troponin I at 0528 on 05/20/18:  Consistent with previous results  Filtered to eliminate fibrin      Comment for Troponin I at 2350 on 05/19/18:  Consistent with previous results      Comment for Troponin I at 1549 on 05/19/18:  Consistent with previous results              No results found for: TSH, J8MMBLF, Y5SNXFM, THYROIDAB        Cardiology Data    Telemetry  Sinus     Catheterization Results:  5/18/18  FINDINGS:  1. 80-90% proximal LAD and 70% mid LAD lesions.   2. 80% stenosis of the superior branch of OM 1 and 95% stenosis of the inferior branch of OM 1 (culprit lesion).  3. Non-obstructive disease in the RCA.      RECOMMENDATIONS:    1. Consult CT surgery to discuss revascularization options.   2. Monitor on heparin gtt in the CCU.   3. Continue with aggressive risk factor modification and medical therapy.   4. Check echocardiogram.     Echo Results:  TTE 5/19/18  · Low normal systolic function. Estimated EF = 50%. Grade I diastolic dysfunction.Hypokinesis of the lateral and inferolateral wall.  · Calcified aortic valve with mild aortic stenosis with mean gradient of 10mmHg.  · Normal-sized right ventricular cavity with preserved systolic function.  · Trace tricuspid valve regurgitation.  · Mild mitral valve regurgitation. Sclerotic mitral valve.  · Mildly dilated left atrium.      Radiology  CXR 5/22  IMPRESSION: Right upper extremity PICC line terminates within the superior vena  cava.     COMMENT: The current portable examination of the chest was compared to that  dated earlier on the same day.     In the interval, a right upper extremity PICC  line has been inserted.  The  catheter tip terminates within the superior vena cava.     The Lolita-Phuc catheter has been removed in the interval.  The right jugular  central venous sheath remains in place.     Left chest tubes are in place with no evidence of pneumothorax.     Increasing pleural-parenchymal disease at the right lung base is noted with  increasing volume loss and small right pleural effusion..        Esvin Plunkett MD   12:27 PM

## 2018-05-22 NOTE — PROGRESS NOTES
Patient: Maximus Daniel  [address]  MRN: [id number]  Today's date: 5/22/2018      Pt left in chair with call bell and personal items within reach. RN informed          Pain/Vitals - 05/22/18 0943        Pain/Comfort/Sleep    Presence of Pain complains of pain/discomfort    Preferred Pain Scale word (verbal rating pain scale)    Pain Body Location chest    Pain Rating: Rest 2 - mild pain    Pain Rating: Activity 2 - mild pain    Pain Management Interventions position adjusted;pillow support provided       Vital Signs    Pulse (!)  104    SpO2 94 %    Patient Activity Other (Comment)   post activity    Oxygen Therapy Supplemental oxygen    O2 Delivery Method High flow nasal cannula    O2 Flow Rate (L/min) 12 L/min    BP (!)  151/69          Prior Living Environment  Lives With: alone  Living Arrangements: apartment  Living Environment Comment: 2nd fl apt with elevator access Equipment Currently Used at Home: glucometer       Prior Level of Function  Ambulation: independent  Transferring: independent  Toileting: independent  Bathing: independent  Dressing: independent  Eating: independent  Communication: understands/communicates without difficulty  Swallowing: swallows foods/liquids without difficulty  Equipment Currently Used at Home: glucometer           PT Treatment Summary - 05/22/18 0943        Session Details    Document Type daily treatment    Mode of Treatment physical therapy       Time Calculation    Start Time 0943    Stop Time 0956    Time Calculation (min) 13 min       General Information    Patient Profile Reviewed? yes       Bed Chair WC Transfer Training    Sit-Stand Transfers, [place] minimum assist (75% patient effort);1 person assist    Stand-Sit Transfers, [place] minimum assist (75% patient effort);1 person assist       Gait Training    [place] minimum assist (75% or more patient effort);1 person assist    Assistive Device walker,  front-wheeled    Distance in Feet 3 feet    Gait Deviations Identified decreased mely;decreased step length;decreased stride length    Comment limited by TURPIN with minimal activity       AM-PAC (TM) - Mobility    Turning from your back to your side while in a flat bed without using bedrails? 2 - A Lot    Moving from lying on your back to sitting on the side of a flat bed without using bedrails? 2 - A Lot    Moving to and from a bed to a chair? 3 - A Little    Standing up from a chair using your arms? 3 - A Little    To walk in a hospital room? 3 - A Little    Climbing 3-5 steps with a railing? 2 - A Lot    AM-PAC (TM) Mobility Score 15       PT Clinical Impression    Plan For Care Reviewed: Physical Therapy patient voices agreement with PT plan for care;patient feedback incorporated in PT plan for care;PT plan for care discussed with patient    Impairments Found (PT Eval) aerobic capacity/endurance;gait, locomotion, and balance    Rehab Potential/Prognosis good, to achieve stated therapy goals    PT Frequency of Treatment 5-7 times per week    Problem List impaired balance;pain   decreased endurance    Anticipated Equipment Needs at Discharge --   TBA at rehab    Expected Discharge Disposition skilled nursing facility    Daily Outcome Statement pt continues to present with min functional deficits, limited by TURPIN. pt lives alone and would benefit from SNF to max pts functional mobility                   Education provided this session. See the Patient Education summary report for full details.    PT Care Plan Goals    Flowsheet Row Most Recent Value   Bed Mobility Goal   Date Goal Established: Bed Mobility  05/21/18   Time to Achieve Goal: Bed Mobility  by discharge   Goal Activity: Bed Mobility  rolling to left, rolling to right, sit to supine/supine to sit   Goal Outcome Achieved: Bed Mobility  goal ongoing   Gait Training Goal   Date Goal Established: Gait Training  05/21/18   Time to Achieve Goal: Gait Training   by discharge   Level of [place] Goal: Gait Training  modified independence   Assistive Device Used: Gait Training  walker, rolling   Distance Goal: Gait Training (feet)  250 feet   Goal Outcome Achieved: Gait Training  goal ongoing   Goal Transfer Training   Date Goal Established: Transfer Training  05/21/18   Time to Achieve Goal: Transfer Training  by discharge   Goal Activity: Transfer Training  sit-to-stand/stand-to-sit, bed-to-chair/chair-to-bed   Transfer Training Goal, [place] Level  independent   Goal Outcome Achieved: Transfer Training  goal ongoing

## 2018-05-22 NOTE — PLAN OF CARE
"Problem: Patient Care Overview  Goal: Discharge Needs Assessment  Outcome: Ongoing (interventions implemented as appropriate)  EMR reviewed. PT recommending post acute skilled nursing stay. SW met with pt and son bedside to discuss facility options. Per CCRN, dght Gina expressed some interest in their conversation re: assisted living facilities. SW provided list in room of local AL facilities. Advised son of private pay status for these facilities. Son reports pt has recently filed for bankrupty and does not have finances. Pt AAOx3. Acknowledges that he was at times not compliant or \"lost count\" of his medications. He states he knows he needs to make lifestyle changes. Son reports dghts assisted with household maintenance and shopping. Son has not been to the home but states it is unkept. Pt states he does not see it as such. Pt's goal is to return to his home following rehab. Pt provided [facility] as well as  [facility] as SNF preferences. [facility] referral sent. SW requested pt meet with family and select several other preferences this Pm/      Later: Received call from dght Gina Garcia [phone]. She states she feels pt lacks capacity and wishes for pt to be placed long term in a nursing home. She understands pt cannot afford an assisted living. SW reviewed capacity and legal competency definition. SW advised pt appeared AAOX3 today. SW encouraged dght to discuss this issue with the medical team. SW further encouraged dght to meet with pt and discuss long term plan as it appears family and pt goals differ. From the hospital, goal can be to place pt for SNF in a facility with the capacity for LTC if pt is agreeable. JLUIS advised dght that list is in pts room and requested contact by 5/23 with 4 preferences.    JLUIS will follow for transition of care planning.     Helga Colin, CIRILO  [id number]   05/21/18 1416 05/22/18 1429   NM Needs Assessment   Concerns To Be Addressed --  care coordination/care " conferences;coping/stress concerns;decision making concerns;compliance issue concerns;discharge planning concerns;home safety concerns   Readmission Within The Last 30 Days no previous admission in last 30 days --    Provider Choice List(s) Given yes  (selects [facility]) --    Anticipated Discharge Disposition --  skilled nursing facility   Current Discharge Risk --  chronically ill;dependent with mobility/activities of daily living;lives alone;lack of support system/caregiver;physical impairment   Current Health   Anticipated Changes Related to Illness --  inability to care for self

## 2018-05-22 NOTE — PLAN OF CARE
Problem: Patient Care Overview  Goal: Discharge Needs Assessment  Outcome: Ongoing (interventions implemented as appropriate)  Received TC from daughter, Gina Meyers. She states patient does not care for himself at home. Apartment is messy requiring his 2 daughters to clean once a week. Eats fast food frequently. Noncompliant with meds and diabetic management. Has glucometer but does not use. Daughter confirms he still drives. Patient has not been declared legally incompetent. Gina states that patient refuses assisted living or long term care.  Daughter would like patient to go to short term rehab in which that facility has assisted living and/or long term care. Daughter requests [facility] [place] or [facility] in [facility]. Advised daughter that she can't force patient to change living arrangements. Suggested she speak to  regarding competency issues. Provided daughter with [facility] contact info. Will follow to assist with transitions in care. Pg # [phone]   05/22/18 3228   Activity/Self Care ROS   Equipment Currently Used at Home glucometer

## 2018-05-22 NOTE — PLAN OF CARE
Problem: Patient Care Overview  Goal: Plan of Care Review  Outcome: Ongoing (interventions implemented as appropriate)   05/22/18 1104   Coping/Psychosocial   Plan Of Care Reviewed With patient   Plan of Care Review   Progress progress toward functional goals as expected   Outcome Summary limited by TURPIN       Problem: Cardiac Surgery (Adult)  Goal: Signs and Symptoms of Listed Potential Problems Will be Absent, Minimized or Managed (Cardiac Surgery)  Outcome: Ongoing (interventions implemented as appropriate)      Problem: Acute Therapy Services Goal & Intervention Plan  Goal: Bed Mobility Goal  Outcome: Ongoing (interventions implemented as appropriate)    Goal: Gait Training Goal  Outcome: Ongoing (interventions implemented as appropriate)    Goal: Transfer Training Goal  Outcome: Ongoing (interventions implemented as appropriate)

## 2018-05-22 NOTE — PROGRESS NOTES
[facility] - received referral from CC reviewed chart and saw that plan was for SNF possibly. Asked to speak with pt re: home care in case his d/c needs change. I spoke briefly to pt and gave our info to pt's son Bill

## 2018-05-23 ENCOUNTER — APPOINTMENT (INPATIENT)
Dept: PHYSICAL THERAPY | Facility: HOSPITAL | Age: 78
DRG: 234 | End: 2018-05-23
Payer: MEDICARE

## 2018-05-23 ENCOUNTER — APPOINTMENT (INPATIENT)
Dept: RADIOLOGY | Facility: HOSPITAL | Age: 78
DRG: 234 | End: 2018-05-23
Attending: PHYSICIAN ASSISTANT
Payer: MEDICARE

## 2018-05-23 ENCOUNTER — APPOINTMENT (INPATIENT)
Dept: RADIOLOGY | Facility: HOSPITAL | Age: 78
DRG: 234 | End: 2018-05-23
Attending: NURSE PRACTITIONER
Payer: MEDICARE

## 2018-05-23 LAB
ANION GAP SERPL CALC-SCNC: 7 MEQ/L (ref 3–15)
BASE EXCESS BLDA CALC-SCNC: 4.6 MMOL/L
BUN SERPL-MCNC: 23 MG/DL (ref 8–20)
CA-I BLD-SCNC: 1.08 MMOL/L (ref 1.15–1.27)
CA-I BLD-SCNC: 1.12 MMOL/L (ref 1.15–1.27)
CALCIUM SERPL-MCNC: 8.1 MG/DL (ref 8.9–10.3)
CHLORIDE SERPL-SCNC: 103 MMOL/L (ref 98–109)
CO2 BLDA-SCNC: 29 MMOL/L (ref 22–32)
CO2 SERPL-SCNC: 25 MMOL/L (ref 22–32)
CREAT SERPL-MCNC: 1.1 MG/DL (ref 0.8–1.3)
ERYTHROCYTE [DISTWIDTH] IN BLOOD BY AUTOMATED COUNT: 17.4 % (ref 11.6–14.4)
GFR SERPL CREATININE-BSD FRML MDRD: >60 ML/MIN/1.73M*2
GLUCOSE BLD-MCNC: 115 MG/DL (ref 70–99)
GLUCOSE BLD-MCNC: 136 MG/DL (ref 70–99)
GLUCOSE BLD-MCNC: 172 MG/DL (ref 70–99)
GLUCOSE BLD-MCNC: 206 MG/DL (ref 70–99)
GLUCOSE BLDA-MCNC: 154 MG/DL (ref 65–95)
GLUCOSE SERPL-MCNC: 170 MG/DL (ref 70–99)
HCO3 BLDA-SCNC: 28 MMOL/L (ref 21–28)
HCT VFR BLDA CALC: 30 % (ref 36–48)
HCT VFR BLDCO AUTO: 28.1 % (ref 40–51)
HGB BLD-MCNC: 9 G/DL (ref 13.7–17.5)
LACTATE BLDA-SCNC: 1.1 MMOL/L (ref 0.4–1.6)
MAGNESIUM SERPL-MCNC: 2.1 MG/DL (ref 1.8–2.5)
MCH RBC QN AUTO: 26.4 PG (ref 28–33.2)
MCHC RBC AUTO-ENTMCNC: 32 G/DL (ref 32.2–36.5)
MCV RBC AUTO: 82.4 FL (ref 83–98)
PCO2 BLDA: 37 MMHG (ref 35–48)
PDW BLD AUTO: 9.7 FL (ref 9.4–12.4)
PH BLDA: 7.49 PH (ref 7.35–7.45)
PHOSPHATE SERPL-MCNC: 1.8 MG/DL (ref 2.4–4.7)
PLATELET # BLD AUTO: 190 K/UL (ref 150–350)
PO2 BLDA: 74 MMHG (ref 60–70)
POCT PATIENT TEMPERATURE: 98.6 F (ref 97–99)
POTASSIUM BLDA-SCNC: 4.2 MEQ/L (ref 3.4–4.5)
POTASSIUM SERPL-SCNC: 4.3 MMOL/L (ref 3.6–5.1)
RBC # BLD AUTO: 3.41 M/UL (ref 4.5–5.8)
SAO2 % BLDA: 96 % (ref 93–98)
SAO2 % BLDV: 52.1 % (ref 30–60)
SAO2 % BLDV: 62.5 % (ref 30–60)
SAO2 % BLDV: 73.9 % (ref 30–60)
SODIUM BLDA-SCNC: 134 MEQ/L (ref 136–145)
SODIUM SERPL-SCNC: 135 MMOL/L (ref 136–144)
WBC # BLD AUTO: 6.53 K/UL (ref 3.8–10.5)

## 2018-05-23 PROCEDURE — 71045 X-RAY EXAM CHEST 1 VIEW: CPT

## 2018-05-23 PROCEDURE — 71045 X-RAY EXAM CHEST 1 VIEW: CPT | Mod: 76

## 2018-05-23 PROCEDURE — 63700000 HC SELF-ADMINISTRABLE DRUG: Performed by: PHYSICIAN ASSISTANT

## 2018-05-23 PROCEDURE — 20000000 HC ROOM AND CARE ICU

## 2018-05-23 PROCEDURE — 63700000 HC SELF-ADMINISTRABLE DRUG: Performed by: STUDENT IN AN ORGANIZED HEALTH CARE EDUCATION/TRAINING PROGRAM

## 2018-05-23 PROCEDURE — 25000000 HC PHARMACY GENERAL: Performed by: PHYSICIAN ASSISTANT

## 2018-05-23 PROCEDURE — 63600000 HC DRUGS/DETAIL CODE: Performed by: PHYSICIAN ASSISTANT

## 2018-05-23 PROCEDURE — 25800000 HC PHARMACY IV SOLUTIONS: Performed by: PHYSICIAN ASSISTANT

## 2018-05-23 PROCEDURE — 63700000 HC SELF-ADMINISTRABLE DRUG: Performed by: NURSE PRACTITIONER

## 2018-05-23 PROCEDURE — 80048 BASIC METABOLIC PNL TOTAL CA: CPT | Performed by: PHYSICIAN ASSISTANT

## 2018-05-23 PROCEDURE — 82810 BLOOD GASES O2 SAT ONLY: CPT | Performed by: THORACIC SURGERY (CARDIOTHORACIC VASCULAR SURGERY)

## 2018-05-23 PROCEDURE — 63700000 HC SELF-ADMINISTRABLE DRUG: Performed by: INTERNAL MEDICINE

## 2018-05-23 PROCEDURE — 82810 BLOOD GASES O2 SAT ONLY: CPT | Performed by: PHYSICIAN ASSISTANT

## 2018-05-23 PROCEDURE — 84100 ASSAY OF PHOSPHORUS: CPT | Performed by: PHYSICIAN ASSISTANT

## 2018-05-23 PROCEDURE — 63600000 HC DRUGS/DETAIL CODE: Performed by: NURSE PRACTITIONER

## 2018-05-23 PROCEDURE — 94640 AIRWAY INHALATION TREATMENT: CPT

## 2018-05-23 PROCEDURE — 85027 COMPLETE CBC AUTOMATED: CPT | Performed by: PHYSICIAN ASSISTANT

## 2018-05-23 PROCEDURE — 99233 SBSQ HOSP IP/OBS HIGH 50: CPT | Performed by: INTERNAL MEDICINE

## 2018-05-23 PROCEDURE — 99024 POSTOP FOLLOW-UP VISIT: CPT | Performed by: ANESTHESIOLOGY

## 2018-05-23 PROCEDURE — 97116 GAIT TRAINING THERAPY: CPT | Mod: GP

## 2018-05-23 PROCEDURE — 36415 COLL VENOUS BLD VENIPUNCTURE: CPT | Performed by: PHYSICIAN ASSISTANT

## 2018-05-23 PROCEDURE — 83735 ASSAY OF MAGNESIUM: CPT | Performed by: PHYSICIAN ASSISTANT

## 2018-05-23 PROCEDURE — 82330 ASSAY OF CALCIUM: CPT | Performed by: PHYSICIAN ASSISTANT

## 2018-05-23 RX ORDER — POLYETHYLENE GLYCOL 3350 17 G/17G
17 POWDER, FOR SOLUTION ORAL DAILY
Status: DISCONTINUED | OUTPATIENT
Start: 2018-05-23 | End: 2018-05-26 | Stop reason: HOSPADM

## 2018-05-23 RX ORDER — FUROSEMIDE 40 MG/1
40 TABLET ORAL
Status: DISCONTINUED | OUTPATIENT
Start: 2018-05-23 | End: 2018-05-25

## 2018-05-23 RX ADMIN — OXYCODONE HYDROCHLORIDE 10 MG: 5 TABLET ORAL at 16:56

## 2018-05-23 RX ADMIN — DOBUTAMINE IN DEXTROSE 2 MCG/KG/MIN: 200 INJECTION, SOLUTION INTRAVENOUS at 05:00

## 2018-05-23 RX ADMIN — SODIUM PHOSPHATE, MONOBASIC, MONOHYDRATE AND SODIUM PHOSPHATE, DIBASIC, ANHYDROUS 30 MMOL: 276; 142 INJECTION, SOLUTION INTRAVENOUS at 05:39

## 2018-05-23 RX ADMIN — AMIODARONE HYDROCHLORIDE 400 MG: 200 TABLET ORAL at 15:16

## 2018-05-23 RX ADMIN — ALBUTEROL SULFATE 2.5 MG: 2.5 SOLUTION RESPIRATORY (INHALATION) at 08:12

## 2018-05-23 RX ADMIN — HEPARIN SODIUM 5000 UNITS: 5000 INJECTION INTRAVENOUS; SUBCUTANEOUS at 22:19

## 2018-05-23 RX ADMIN — CHLORHEXIDINE GLUCONATE 15 ML: 1.2 RINSE ORAL at 19:43

## 2018-05-23 RX ADMIN — AMIODARONE HYDROCHLORIDE 400 MG: 200 TABLET ORAL at 09:02

## 2018-05-23 RX ADMIN — POTASSIUM CHLORIDE 20 MEQ: 200 INJECTION, SOLUTION INTRAVENOUS at 04:58

## 2018-05-23 RX ADMIN — POLYETHYLENE GLYCOL 3350 17 GRAM ORAL POWDER PACKET 17 G: at 09:06

## 2018-05-23 RX ADMIN — ALBUTEROL SULFATE 2.5 MG: 2.5 SOLUTION RESPIRATORY (INHALATION) at 02:38

## 2018-05-23 RX ADMIN — LEVOTHYROXINE SODIUM 150 MCG: 150 TABLET ORAL at 06:54

## 2018-05-23 RX ADMIN — OXYCODONE HYDROCHLORIDE 10 MG: 5 TABLET ORAL at 04:59

## 2018-05-23 RX ADMIN — INSULIN GLARGINE 20 UNITS: 100 INJECTION, SOLUTION SUBCUTANEOUS at 22:24

## 2018-05-23 RX ADMIN — ATORVASTATIN CALCIUM 80 MG: 80 TABLET, FILM COATED ORAL at 19:44

## 2018-05-23 RX ADMIN — OXYCODONE HYDROCHLORIDE 10 MG: 5 TABLET ORAL at 09:07

## 2018-05-23 RX ADMIN — ASPIRIN 81 MG: 81 TABLET, DELAYED RELEASE ORAL at 09:02

## 2018-05-23 RX ADMIN — OXYCODONE HYDROCHLORIDE 10 MG: 5 TABLET ORAL at 22:19

## 2018-05-23 RX ADMIN — POTASSIUM CHLORIDE 20 MEQ: 750 TABLET, FILM COATED, EXTENDED RELEASE ORAL at 19:45

## 2018-05-23 RX ADMIN — INSULIN ASPART 6 UNITS: 100 INJECTION, SOLUTION INTRAVENOUS; SUBCUTANEOUS at 12:24

## 2018-05-23 RX ADMIN — HEPARIN SODIUM 5000 UNITS: 5000 INJECTION INTRAVENOUS; SUBCUTANEOUS at 15:15

## 2018-05-23 RX ADMIN — ACETAMINOPHEN 650 MG: 325 TABLET, FILM COATED ORAL at 04:59

## 2018-05-23 RX ADMIN — SENNOSIDES 1 TABLET: 8.6 TABLET, FILM COATED ORAL at 19:45

## 2018-05-23 RX ADMIN — INSULIN ASPART 3 UNITS: 100 INJECTION, SOLUTION INTRAVENOUS; SUBCUTANEOUS at 09:04

## 2018-05-23 RX ADMIN — ALBUTEROL SULFATE 2.5 MG: 2.5 SOLUTION RESPIRATORY (INHALATION) at 11:38

## 2018-05-23 RX ADMIN — FUROSEMIDE 40 MG: 40 TABLET ORAL at 09:04

## 2018-05-23 RX ADMIN — INSULIN ASPART 6 UNITS: 100 INJECTION, SOLUTION INTRAVENOUS; SUBCUTANEOUS at 16:49

## 2018-05-23 RX ADMIN — FAMOTIDINE 20 MG: 20 TABLET ORAL at 19:44

## 2018-05-23 RX ADMIN — ALBUTEROL SULFATE 2.5 MG: 2.5 SOLUTION RESPIRATORY (INHALATION) at 19:58

## 2018-05-23 RX ADMIN — ACETAMINOPHEN 650 MG: 325 TABLET, FILM COATED ORAL at 16:57

## 2018-05-23 RX ADMIN — MAGNESIUM SULFATE HEPTAHYDRATE 2 G: 40 INJECTION, SOLUTION INTRAVENOUS at 04:59

## 2018-05-23 RX ADMIN — HEPARIN SODIUM 5000 UNITS: 5000 INJECTION INTRAVENOUS; SUBCUTANEOUS at 06:54

## 2018-05-23 RX ADMIN — CALCIUM CHLORIDE 1 G: 100 INJECTION, SOLUTION INTRAVENOUS at 06:55

## 2018-05-23 RX ADMIN — INSULIN ASPART 6 UNITS: 100 INJECTION, SOLUTION INTRAVENOUS; SUBCUTANEOUS at 09:05

## 2018-05-23 RX ADMIN — DOCUSATE SODIUM 100 MG: 100 CAPSULE, LIQUID FILLED ORAL at 19:44

## 2018-05-23 RX ADMIN — FUROSEMIDE 40 MG: 40 TABLET ORAL at 16:48

## 2018-05-23 RX ADMIN — CHLORHEXIDINE GLUCONATE 15 ML: 1.2 RINSE ORAL at 09:03

## 2018-05-23 RX ADMIN — DOCUSATE SODIUM 100 MG: 100 CAPSULE, LIQUID FILLED ORAL at 09:03

## 2018-05-23 RX ADMIN — ACETAMINOPHEN 650 MG: 325 TABLET, FILM COATED ORAL at 12:16

## 2018-05-23 RX ADMIN — ALBUTEROL SULFATE 2.5 MG: 2.5 SOLUTION RESPIRATORY (INHALATION) at 15:23

## 2018-05-23 RX ADMIN — FAMOTIDINE 20 MG: 20 TABLET ORAL at 09:04

## 2018-05-23 RX ADMIN — MAGNESIUM OXIDE TAB 400 MG (240 MG ELEMENTAL MG) 400 MG: 400 (240 MG) TAB at 19:44

## 2018-05-23 RX ADMIN — COLCHICINE 0.6 MG: 0.6 TABLET, FILM COATED ORAL at 09:03

## 2018-05-23 RX ADMIN — AMIODARONE HYDROCHLORIDE 400 MG: 200 TABLET ORAL at 19:43

## 2018-05-23 RX ADMIN — ALBUTEROL SULFATE 2.5 MG: 2.5 SOLUTION RESPIRATORY (INHALATION) at 23:10

## 2018-05-23 RX ADMIN — SENNOSIDES 1 TABLET: 8.6 TABLET, FILM COATED ORAL at 09:06

## 2018-05-23 ASSESSMENT — COGNITIVE AND FUNCTIONAL STATUS - GENERAL
CLIMB 3 TO 5 STEPS WITH RAILING: 3 - A LITTLE
MOVING TO AND FROM BED TO CHAIR: 3 - A LITTLE
WALKING IN HOSPITAL ROOM: 3 - A LITTLE
STANDING UP FROM CHAIR USING ARMS: 3 - A LITTLE

## 2018-05-23 ASSESSMENT — ENCOUNTER SYMPTOMS
NAUSEA: 0
FEVER: 0
LIGHT-HEADEDNESS: 0
DIZZINESS: 0
PALPITATIONS: 0
HEADACHES: 0
VOMITING: 0
CHILLS: 0
COUGH: 0
EYE PAIN: 0
SHORTNESS OF BREATH: 0
ALTERED MENTAL STATUS: 0
NEAR-SYNCOPE: 0

## 2018-05-23 NOTE — PROGRESS NOTES
Assessment/Plan       1)  STEMI (ST elevation myocardial infarction) (CMS/[facility]) ([facility])  - patient is now post op day 3 CABG x 4 with LIMA- LAD, SVG - Diag, SVG - Om1, SVG - Om2  - doing well with minimal dose of dobutamine still infusing  - wean inotropes/pressors per primary team  - amio TID with decrease to 200mg daily at discharge  - continue lasix - would use IV 40 1-2 times daily to optimize volume and reduce pulmonary edema on cxr  - currently on asa, consider plavix  - start low dose metoprolol succinate when dobutamine discontinued    Subjective   Interval History: none.     Review of Systems   Constitution: Negative for chills and fever.   Eyes: Negative for pain.   Cardiovascular: Negative for chest pain, leg swelling, near-syncope and palpitations.   Respiratory: Negative for cough and shortness of breath.    Gastrointestinal: Negative for nausea and vomiting.   Neurological: Negative for dizziness, headaches and light-headedness.   Psychiatric/Behavioral: Negative for altered mental status.       Scheduled Meds:    albuterol 2.5 mg nebulization q4h [facility]   amiodarone 400 mg oral TID   aspirin      aspirin 81 mg oral Daily   atorvastatin 80 mg oral Nightly   chlorhexidine 15 mL Mouth/Throat BID   colchicine 0.6 mg oral Daily   docusate sodium 100 mg oral BID   famotidine 20 mg oral BID   furosemide 40 mg oral BID (9a, 4p)   furosemide 40 mg oral BID (9a, 4p)   heparin (porcine)      heparin (porcine) 5,000 Units subcutaneous q8h [facility]   insulin aspart U-100 3-5 Units subcutaneous With meals & nightly   insulin aspart U-100 6 Units subcutaneous TID with meals   insulin glargine 20 Units subcutaneous Nightly   levothyroxine 150 mcg oral Daily (6:30a)   lidocaine PF      magnesium oxide 400 mg oral BID   metoprolol succinate XL 25 mg oral Nightly   Or      metoprolol succinate XL 50 mg oral Nightly   polyethylene glycol 17 g oral Daily   potassium chloride 20 mEq oral BID   senna 1 tablet oral BID     Continuous  Infusions:    amiodarone (CORDARONE) IV infusion 1 mg/min Last Rate: Stopped (05/21/18 2200)   dexmedetomidine in 0.9 % NaCl 0.2-1.5 mcg/kg/hr Last Rate: Stopped (05/22/18 0418)   DOBUTamine 0.5-20 mcg/kg/min (Dosing Weight) Last Rate: Stopped (05/23/18 1512)   EPINEPHrine 1 mcg/min Last Rate: Stopped (05/22/18 1100)   insulin 0-15 Units/hr Last Rate: Stopped (05/22/18 1000)   milrinone 0.25 mcg/kg/min Last Rate: Stopped (05/21/18 1700)   niCARdipine (CARDENE) infusion 2.5-15 mg/hr    norepinephrine 0.5-30 mcg/min Last Rate: Stopped (05/22/18 0418)   vasopressin (PITRESSIN) infusion 0.04 Units/min Last Rate: Stopped (05/21/18 1700)     PRN Meds:.•  acetaminophen  •  albumin human  •  alum-mag hydroxide-simeth  •  bisacodyl  •  calcium chloride  •  oxyCODONE **OR** HYDROmorphone  •  magnesium sulfate  •  ondansetron ODT **OR** ondansetron  •  potassium chloride    Objective     Vital signs in last 24 hours:    Vitals:    05/23/18 1200 05/23/18 1300 05/23/18 1400 05/23/18 1500   BP:       BP Location:       Patient Position:       Pulse: 90 91 87 83   Resp: (!) 22 18 18 20   Temp:       TempSrc:       SpO2: 97% 95% 96% 97%   Weight:       Height:             Intake/Output Summary (Last 24 hours) at 05/23/18 1537  Last data filed at 05/23/18 1512   Gross per 24 hour   Intake           786.56 ml   Output             2245 ml   Net         -1458.44 ml     Weights (last 7 days)     Date/Time   Weight   Drug Calculation Weight (Dosing Weight)    05/23/18 0600  92.5 kg (203 lb 14.8 oz)  --    05/22/18 0600  92.6 kg (204 lb 2.3 oz)  --    05/21/18 0600  90.5 kg (199 lb 8.3 oz)  --    05/20/18 1350  --  90 kg (198 lb 6.6 oz)    05/20/18 1342  90 kg (198 lb 6.6 oz)  --    05/19/18 0845  89.8 kg (198 lb)  --    05/18/18 1717  89.8 kg (198 lb)  --                  Physical Exam   Constitutional: He is oriented to person, place, and time. He appears well-developed and well-nourished.   HENT:   Head: Normocephalic and atraumatic.    Eyes: Conjunctivae and EOM are normal. Pupils are equal, round, and reactive to light.   Neck: Normal range of motion. Neck supple. No JVD present.   Cardiovascular: Normal rate and regular rhythm.  Exam reveals friction rub.    Pulmonary/Chest: Effort normal. No respiratory distress. He has no wheezes. He has rales.   Abdominal: Soft. Bowel sounds are normal. He exhibits no distension. There is no tenderness. There is no rebound.   Musculoskeletal: Normal range of motion. He exhibits no edema.   Neurological: He is alert and oriented to person, place, and time.   Skin: Skin is warm and dry.   Psychiatric: He has a normal mood and affect. His behavior is normal. Judgment and thought content normal.       Labs    CBC Results       05/23/18 05/22/18 05/21/18                    0404 0309 1212         WBC 6.53 7.91 7.05         RBC 3.41 (L) 3.53 (L) 3.47 (L)         HGB 9.0 (L) 9.4 (L) 9.3 (L)         HCT 28.1 (L) 29.4 (L) 29.4 (L)         MCV 82.4 (L) 83.3 84.7         MCH 26.4 (L) 26.6 (L) 26.8 (L)         MCHC 32.0 (L) 32.0 (L) 31.6 (L)          209 203                       CMP Results       05/23/18 05/22/18 05/22/18                    0404 1303 0309          (L) - 135 (L)         K 4.3 4.1 4.4         Cl 103 - 103         CO2 25 - 21 (L)         Glucose 170 (H) - 147 (H)         BUN 23 (H) - 29 (H)         Creatinine 1.1 - 1.3         Calcium 8.1 (L) - 8.9         Anion Gap 7 - 11         EGFR &gt;60.0 - 53.3 (L)                         PT PTT Results       05/20/18 05/20/18 05/19/18                    1357 1209 2350         PT 19.1 (H) 20.6 (H) -         INR 1.6 1.7 -         PTT 30 30 48 (H)         Comment for INR at 1357 on 05/20/18:    Moderate Intensity Anticoagulation = 2.0 to 3.0, High Intensity = 2.5 to 3.5    Comment for INR at 1209 on 05/20/18:    Moderate Intensity Anticoagulation = 2.0 to 3.0, High Intensity = 2.5 to 3.5    Comment for PTT at 2350 on 05/19/18:    The Standard  Therapeutic Range for Heparin is 68 to 101 seconds.          Troponin I Results       05/20/18 05/19/18 05/19/18                    0528 2350 1549         Troponin I 13.14 (HH) 18.10 (HH) 19.38 (HH)         Comment for Troponin I at 0528 on 05/20/18:  Consistent with previous results  Filtered to eliminate fibrin      Comment for Troponin I at 2350 on 05/19/18:  Consistent with previous results      Comment for Troponin I at 1549 on 05/19/18:  Consistent with previous results                  No results found for: TSH, S8CSPBC, S8XGXEH, THYROIDAB        Cardiology Data    Telemetry  Sinus     Catheterization Results:  5/18/18  FINDINGS:  1. 80-90% proximal LAD and 70% mid LAD lesions.   2. 80% stenosis of the superior branch of OM 1 and 95% stenosis of the inferior branch of OM 1 (culprit lesion).  3. Non-obstructive disease in the RCA.      RECOMMENDATIONS:    1. Consult CT surgery to discuss revascularization options.   2. Monitor on heparin gtt in the CCU.   3. Continue with aggressive risk factor modification and medical therapy.   4. Check echocardiogram.     Echo Results:  TTE 5/19/18  · Low normal systolic function. Estimated EF = 50%. Grade I diastolic dysfunction.Hypokinesis of the lateral and inferolateral wall.  · Calcified aortic valve with mild aortic stenosis with mean gradient of 10mmHg.  · Normal-sized right ventricular cavity with preserved systolic function.  · Trace tricuspid valve regurgitation.  · Mild mitral valve regurgitation. Sclerotic mitral valve.  · Mildly dilated left atrium.      Radiology  CXR 5/22  IMPRESSION: Right upper extremity PICC line terminates within the superior vena  cava.     COMMENT: The current portable examination of the chest was compared to that  dated earlier on the same day.     In the interval, a right upper extremity PICC line has been inserted.  The  catheter tip terminates within the superior vena cava.     The [place]-Phuc catheter has been removed in the  interval.  The right jugular  central venous sheath remains in place.     Left chest tubes are in place with no evidence of pneumothorax.     Increasing pleural-parenchymal disease at the right lung base is noted with  increasing volume loss and small right pleural effusion..        Esvin Plunkett MD   3:37 PM

## 2018-05-23 NOTE — PROGRESS NOTES
Pt. POD #3 CABG x4. Pt presently on dobutamine gtt for support. Will continue to attempt weaning. On 12L HFNC. Pt will require post acute rehab. Per discussion with family yesterday and again today at bedside: dght Gina [phone] and pt. Preference is 1) [facility] [phone]- whois evaluating pt and will make a determination, 2) [phone]. There is no male bed available, but will review the referral and 3) [facility]- Will refer. Call back from [facility]--reviewing referral and would offer a bed. This facility would not offer MA back up and would not be [facility]'s choice. It was identified by pt. Can pursue one of the other options.     Pt is not medically stable. No SAMY at this time/Early next week?    SW will follow and assist with transition of care planning.     Helga Colin, LSW  [phone]

## 2018-05-23 NOTE — PROGRESS NOTES
Cardiothoracic Intensivist Progress Note       CARDIOTHORACIC   Post-Operative Day # 3     Subjective     History of Present Illness: Maximus Daniel is a 78 y.o. cisgender male with history of known coronary artery disease prior to OR s/p CABG, chronic HTN, and hyperlipidemia that are being treated.                              Review of Systems:                                     Constitutional: no acute distress                                                  Eyes: denies difficulty with vision  Ears, nose, mouth, throat, and face: denies oral discomfort                                        Respiratory: denies shortness of breath                                  Cardiovascular: mild chest discomfort at incision site                                  Gastrointestinal: denies abdominal pain                                    Genitourinary: denies difficulty voiding                                      Hematologic: denies bleeding issues                                Musculoskeletal: denies muscle pain                                      Neurological: generalized weakness                                   Integumentary: denies rash      Medical History:   Past Medical History:   Diagnosis Date   • Coronary artery disease    • Diabetes mellitus type I (CMS/HCC) (HCC)    • Hypertension    • Hypothyroidism    • Incarcerated hernia    • Lipid disorder    • Myocardial infarction    • Nephrolithiasis        Surgical History:   Past Surgical History:   Procedure Laterality Date   • ADENOIDECTOMY     • FRACTURE SURGERY     • SKIN BIOPSY     • SKIN CANCER EXCISION     • TONSILLECTOMY         Allergies: Patient has no known allergies.    Social History:   Social History     Social History   • Marital status:      Spouse name: N/A   • Number of children: N/A   • Years of education: N/A     Occupational History   • retired     • retired [employer]       Social History Main Topics   • Smoking status: Current  Every Day Smoker     Packs/day: 0.25     Years: 65.00     Types: Cigarettes, Other (see comments)   • Smokeless tobacco: Never Used   • Alcohol use No   • Drug use: No   • Sexual activity: No     Other Topics Concern   • None     Social History Narrative    Lives alone in an apartment/efficiency.  Does have elevator.  Daughter is local and is power of        Family History:   Family History   Problem Relation Age of Onset   • Heart disease Mother    • Suicide Attempts Father    • Depression Father    • Heart disease Brother        Objective     Vital signs in last 24 hours:  Heart Rate:  [] 96  Resp:  [20-28] 20  BP: (112-127)/(62-71) 122/62      Intake/Output Summary (Last 24 hours) at 05/23/18 0947  Last data filed at 05/23/18 0900   Gross per 24 hour   Intake           931.67 ml   Output             2805 ml   Net         -1873.33 ml     Intake/Output this shift:  I/O this shift:  In: 19.7 [I.V.:19.7]  Out: 295 [Urine:185; Chest Tube:110]    Labs  Lab Results   Component Value Date    WBC 6.53 05/23/2018    HGB 9.0 (L) 05/23/2018    HCT 28.1 (L) 05/23/2018     05/23/2018    CHOL 152 05/19/2018    TRIG 101 05/19/2018    HDL 29 (L) 05/19/2018    ALT 53 05/18/2018     (H) 05/18/2018     (L) 05/23/2018    K 4.3 05/23/2018     05/23/2018    CREATININE 1.1 05/23/2018    BUN 23 (H) 05/23/2018    CO2 25 05/23/2018    INR 1.6 05/20/2018    HGBA1C 10.5 (H) 05/19/2018       Full Code    Head/Ear/Nose/Throat:     NCAT.                               Eyes:     EOMI and PERRL.                     Respiratory:     diminished at the bases b/l.               Cardiovascular:     RRR and normal S1, S2.              Gastrointestinal:     + Bowel sounds and non-tender.                 Genitourinary:     medrano in place.                    Extremities:     trace edema b/l lower extremities.            Musculoskeletal:      No injury or deformity.                   Neurological:     no focal  "deficits.       Behavior/Emotional:     appropriate and cooperative.                           Lymph:      No adenopathy noted.                               Skin:      Clean, dry and intact.     Examination of the patient performed at the bedside. Rounded with ICU team and discussed management/plan with surgical staff. Medications, radiological studies and labs reviewed and discussed with surgeon of record, Dr. Jeff Slater.    Cardiothoracic Assessment and Plan:    Neurologic: A&Ox3 and pain controlled. Will continue to optimize multi-modal pain regimen.     Cardiovascular: Severe CAD s/p CABGx4. Still dobutamine for cardiac support. SVO2 dropped during attempted dobutamine wean and will try again. If unsuccessful will obtain TTE. Will continue to optimize cardiac medications.     Respiratory: Still on HFNC with poor Po2. Likely secondary to chronic smoking history. I personally reviewed the CXR which again portrayed small lung volumes b/l with atelectasis. Will encourage IS, mobilization and wean O2 PRN.      Genitourinary: CKD stage 3. D/C medrano. Making adequate urine output. Will avoid nephrotoxic medications.     Endocrine: Follow FSBG. Hypothryoidism and DM management.     Hematologic: No evidence active bleeding. SQH for DVT ppx. \"Acute (expected) blood loss anemia from surgery.      Infectious Disease: No indication for antibiotics at this time.      Fluids, Electrolytes: Lytes replaced per protocol.     Gastrointestinal: PO as tolerated. GI ppx.     Skin: OOB, Pt.      Tubes, lines and drains: Will remove superfluous invasive monitors PRN.    Disposition: Guarded.  Chronotropic support and poor PO2 s/p major invasive cardiac surgery. Continue ICU care.           Brandon Arambula, DO  5/23/2018           "

## 2018-05-23 NOTE — PROGRESS NOTES
Discussed in rounds. Past attempts to wean Dobutamine failed. To attempt again today. May need Echo. Helga KERR following for SNF. Will follow to assist with transitions of care. Pg # [phone]

## 2018-05-23 NOTE — PLAN OF CARE
Problem: Patient Care Overview  Goal: Plan of Care Review  Outcome: Ongoing (interventions implemented as appropriate)   05/23/18 1002   Coping/Psychosocial   Plan Of Care Reviewed With patient   Plan of Care Review   Progress progress toward functional goals as expected   Outcome Summary limited by decreased cardiopulm endurance       Problem: Cardiac Surgery (Adult)  Goal: Signs and Symptoms of Listed Potential Problems Will be Absent, Minimized or Managed (Cardiac Surgery)  Outcome: Ongoing (interventions implemented as appropriate)      Problem: Acute Therapy Services Goal & Intervention Plan  Goal: Bed Mobility Goal  Outcome: Ongoing (interventions implemented as appropriate)    Goal: Gait Training Goal  Outcome: Ongoing (interventions implemented as appropriate)    Goal: Transfer Training Goal  Outcome: Ongoing (interventions implemented as appropriate)

## 2018-05-23 NOTE — PROGRESS NOTES
Patient: Maximus Daniel  [facility] [place] [address] [facility]  MRN: [id number]  Today's date: 5/23/2018      Pt left in chair with call bell and personal items within reach. RN informed          Pain/Vitals     Row Name 05/23/18 0931 05/23/18 0944       Pain/Comfort/Sleep    Presence of Pain denies  --       Vital Signs    Pulse 100 99    SpO2 98 % 97 %    Patient Activity At rest Other (Comment)   post amb    Oxygen Therapy Supplemental oxygen Supplemental oxygen    O2 Delivery Method High flow nasal cannula High flow nasal cannula    O2 Flow Rate (L/min) 10 L/min 10 L/min    /62 137/61          Prior Living Environment  Lives With: alone  Living Arrangements: apartment  Living Environment Comment: 2nd fl apt with elevator access Equipment Currently Used at Home: glucometer       Prior Level of Function  Ambulation: independent  Transferring: independent  Toileting: independent  Bathing: independent  Dressing: independent  Eating: independent  Communication: understands/communicates without difficulty  Swallowing: swallows foods/liquids without difficulty  Equipment Currently Used at Home: glucometer           PT Treatment Summary - 05/23/18 0931        Session Details    Document Type daily treatment    Mode of Treatment physical therapy       Time Calculation    Start Time 0931    Stop Time 0947    Time Calculation (min) 16 min       General Information    Patient Profile Reviewed? yes    Existing Precautions/Restrictions cardiac;fall       Bed Chair WC Transfer Training    Sit-Stand Transfers, [place] minimum assist (75% patient effort);1 person assist;verbal cues    Verbal Cues (Sit-Stand Transfers) hand placement;safety;technique    Stand-Sit Transfers, [place] minimum assist (75% patient effort);1 person assist;verbal cues    Verbal Cues (Stand-Sit Transfers) hand placement;safety;technique       Gait Training    [place] minimum assist (75% or more patient effort)     Assistive Device walker, front-wheeled    Distance in Feet 12 feet    Gait Deviations Identified decreased mely;decreased step length;decreased stride length   +retropulsion in stance    Comment limited by SOB       AM-PAC (TM) - Mobility    Turning from your back to your side while in a flat bed without using bedrails? 3 - A Little    Moving from lying on your back to sitting on the side of a flat bed without using bedrails? 3 - A Little    Moving to and from a bed to a chair? 3 - A Little    Standing up from a chair using your arms? 3 - A Little    To walk in a hospital room? 3 - A Little    Climbing 3-5 steps with a railing? 3 - A Little    AM-PAC (TM) Mobility Score 18       PT Clinical Impression    Plan For Care Reviewed: Physical Therapy patient voices agreement with PT plan for care;patient feedback incorporated in PT plan for care;PT plan for care discussed with patient    Impairments Found (PT Eval) aerobic capacity/endurance;gait, locomotion, and balance    Rehab Potential/Prognosis good, to achieve stated therapy goals    PT Frequency of Treatment 5-7 times per week    Problem List impaired balance;pain   decreased endurance    Anticipated Equipment Needs at Discharge --   TBA at rehab    Expected Discharge Disposition skilled nursing facility    Daily Outcome Statement pt making steady gains, limited by decreased cardiopulm endurance. rec SNF to max pts functional mobility prior to returning home alone                    Education provided this session. See the Patient Education summary report for full details.    PT Care Plan Goals    Flowsheet Row Most Recent Value   Bed Mobility Goal   Date Goal Established: Bed Mobility  05/21/18   Time to Achieve Goal: Bed Mobility  by discharge   Goal Activity: Bed Mobility  rolling to left, rolling to right, sit to supine/supine to sit   Goal Outcome Achieved: Bed Mobility  goal ongoing   Gait Training Goal   Date Goal Established: Gait Training  05/21/18    Time to Achieve Goal: Gait Training  by discharge   Level of [place] Goal: Gait Training  modified independence   Assistive Device Used: Gait Training  walker, rolling   Distance Goal: Gait Training (feet)  250 feet   Goal Outcome Achieved: Gait Training  goal ongoing   Goal Transfer Training   Date Goal Established: Transfer Training  05/21/18   Time to Achieve Goal: Transfer Training  by discharge   Goal Activity: Transfer Training  sit-to-stand/stand-to-sit, bed-to-chair/chair-to-bed   Transfer Training Goal, [place] Level  independent   Goal Outcome Achieved: Transfer Training  goal ongoing

## 2018-05-23 NOTE — PLAN OF CARE
Problem: Cardiac Surgery (Adult)  Goal: Signs and Symptoms of Listed Potential Problems Will be Absent, Minimized or Managed (Cardiac Surgery)  Outcome: Ongoing (interventions implemented as appropriate)      Problem: Pressure Ulcer Risk (Edison Scale) (Adult,Obstetrics,Pediatric)  Goal: Identify Related Risk Factors and Signs and Symptoms  Outcome: Ongoing (interventions implemented as appropriate)    Goal: Skin Integrity  Outcome: Ongoing (interventions implemented as appropriate)      Problem: Ventilation, Mechanical Invasive (Adult)  Goal: Signs and Symptoms of Listed Potential Problems Will be Absent, Minimized or Managed (Ventilation, Mechanical Invasive)  Outcome: Ongoing (interventions implemented as appropriate)      Problem: Fall Risk (Adult)  Goal: Identify Related Risk Factors and Signs and Symptoms  Outcome: Ongoing (interventions implemented as appropriate)    Goal: Absence of Falls  Outcome: Ongoing (interventions implemented as appropriate)

## 2018-05-24 ENCOUNTER — APPOINTMENT (INPATIENT)
Dept: RADIOLOGY | Facility: HOSPITAL | Age: 78
DRG: 234 | End: 2018-05-24
Attending: PHYSICIAN ASSISTANT
Payer: MEDICARE

## 2018-05-24 LAB
ANION GAP SERPL CALC-SCNC: 9 MEQ/L (ref 3–15)
BUN SERPL-MCNC: 20 MG/DL (ref 8–20)
CALCIUM SERPL-MCNC: 8.1 MG/DL (ref 8.9–10.3)
CHLORIDE SERPL-SCNC: 100 MMOL/L (ref 98–109)
CO2 SERPL-SCNC: 28 MMOL/L (ref 22–32)
CREAT SERPL-MCNC: 1.2 MG/DL (ref 0.8–1.3)
ERYTHROCYTE [DISTWIDTH] IN BLOOD BY AUTOMATED COUNT: 18.2 % (ref 11.6–14.4)
GFR SERPL CREATININE-BSD FRML MDRD: 58.3 ML/MIN/1.73M*2
GLUCOSE BLD-MCNC: 117 MG/DL (ref 70–99)
GLUCOSE BLD-MCNC: 123 MG/DL (ref 70–99)
GLUCOSE BLD-MCNC: 135 MG/DL (ref 70–99)
GLUCOSE BLD-MCNC: 152 MG/DL (ref 70–99)
GLUCOSE BLD-MCNC: 75 MG/DL (ref 70–99)
GLUCOSE SERPL-MCNC: 121 MG/DL (ref 70–99)
HCT VFR BLDCO AUTO: 28.5 % (ref 40–51)
HGB BLD-MCNC: 9 G/DL (ref 13.7–17.5)
MAGNESIUM SERPL-MCNC: 1.9 MG/DL (ref 1.8–2.5)
MCH RBC QN AUTO: 26.5 PG (ref 28–33.2)
MCHC RBC AUTO-ENTMCNC: 31.6 G/DL (ref 32.2–36.5)
MCV RBC AUTO: 84.1 FL (ref 83–98)
PDW BLD AUTO: 10.5 FL (ref 9.4–12.4)
PHOSPHATE SERPL-MCNC: 2 MG/DL (ref 2.4–4.7)
PLATELET # BLD AUTO: 243 K/UL (ref 150–350)
POTASSIUM SERPL-SCNC: 4.3 MMOL/L (ref 3.6–5.1)
RBC # BLD AUTO: 3.39 M/UL (ref 4.5–5.8)
SODIUM SERPL-SCNC: 137 MMOL/L (ref 136–144)
WBC # BLD AUTO: 6.82 K/UL (ref 3.8–10.5)

## 2018-05-24 PROCEDURE — 63600000 HC DRUGS/DETAIL CODE: Performed by: PHYSICIAN ASSISTANT

## 2018-05-24 PROCEDURE — 63700000 HC SELF-ADMINISTRABLE DRUG: Performed by: NURSE PRACTITIONER

## 2018-05-24 PROCEDURE — 63700000 HC SELF-ADMINISTRABLE DRUG: Performed by: PHYSICIAN ASSISTANT

## 2018-05-24 PROCEDURE — 71045 X-RAY EXAM CHEST 1 VIEW: CPT

## 2018-05-24 PROCEDURE — 85027 COMPLETE CBC AUTOMATED: CPT | Performed by: PHYSICIAN ASSISTANT

## 2018-05-24 PROCEDURE — 99232 SBSQ HOSP IP/OBS MODERATE 35: CPT | Performed by: INTERNAL MEDICINE

## 2018-05-24 PROCEDURE — 36415 COLL VENOUS BLD VENIPUNCTURE: CPT | Performed by: PHYSICIAN ASSISTANT

## 2018-05-24 PROCEDURE — 25800000 HC PHARMACY IV SOLUTIONS: Performed by: PHYSICIAN ASSISTANT

## 2018-05-24 PROCEDURE — 21400000 HC ROOM AND CARE CCU/INTERMEDIATE

## 2018-05-24 PROCEDURE — 63600000 HC DRUGS/DETAIL CODE: Performed by: NURSE PRACTITIONER

## 2018-05-24 PROCEDURE — 25000000 HC PHARMACY GENERAL: Performed by: PHYSICIAN ASSISTANT

## 2018-05-24 PROCEDURE — 83735 ASSAY OF MAGNESIUM: CPT | Performed by: PHYSICIAN ASSISTANT

## 2018-05-24 PROCEDURE — 94640 AIRWAY INHALATION TREATMENT: CPT

## 2018-05-24 PROCEDURE — 63700000 HC SELF-ADMINISTRABLE DRUG

## 2018-05-24 PROCEDURE — 80048 BASIC METABOLIC PNL TOTAL CA: CPT | Performed by: PHYSICIAN ASSISTANT

## 2018-05-24 PROCEDURE — 63700000 HC SELF-ADMINISTRABLE DRUG: Performed by: INTERNAL MEDICINE

## 2018-05-24 PROCEDURE — 63700000 HC SELF-ADMINISTRABLE DRUG: Performed by: STUDENT IN AN ORGANIZED HEALTH CARE EDUCATION/TRAINING PROGRAM

## 2018-05-24 PROCEDURE — 84100 ASSAY OF PHOSPHORUS: CPT | Performed by: PHYSICIAN ASSISTANT

## 2018-05-24 PROCEDURE — 99231 SBSQ HOSP IP/OBS SF/LOW 25: CPT | Performed by: ANESTHESIOLOGY

## 2018-05-24 PROCEDURE — 97116 GAIT TRAINING THERAPY: CPT | Mod: GP

## 2018-05-24 RX ORDER — BISACODYL 10 MG/1
10 SUPPOSITORY RECTAL DAILY PRN
Status: DISCONTINUED | OUTPATIENT
Start: 2018-05-24 | End: 2018-05-26 | Stop reason: HOSPADM

## 2018-05-24 RX ORDER — BISACODYL 10 MG/1
10 SUPPOSITORY RECTAL AS NEEDED
Status: DISPENSED | OUTPATIENT
Start: 2018-05-24 | End: 2018-05-25

## 2018-05-24 RX ORDER — CHLORPROMAZINE HYDROCHLORIDE 25 MG/1
25 TABLET, FILM COATED ORAL 3 TIMES DAILY PRN
Status: DISCONTINUED | OUTPATIENT
Start: 2018-05-24 | End: 2018-05-24

## 2018-05-24 RX ORDER — ADHESIVE BANDAGE
30 BANDAGE TOPICAL 2 TIMES DAILY PRN
Status: DISCONTINUED | OUTPATIENT
Start: 2018-05-24 | End: 2018-05-26 | Stop reason: HOSPADM

## 2018-05-24 RX ORDER — ADHESIVE BANDAGE
BANDAGE TOPICAL
Status: COMPLETED
Start: 2018-05-24 | End: 2018-05-24

## 2018-05-24 RX ADMIN — MAGNESIUM OXIDE TAB 400 MG (240 MG ELEMENTAL MG) 400 MG: 400 (240 MG) TAB at 20:59

## 2018-05-24 RX ADMIN — POTASSIUM CHLORIDE 20 MEQ: 200 INJECTION, SOLUTION INTRAVENOUS at 04:24

## 2018-05-24 RX ADMIN — BISACODYL 10 MG: 10 SUPPOSITORY RECTAL at 11:30

## 2018-05-24 RX ADMIN — MAGNESIUM HYDROXIDE 30 ML: 400 SUSPENSION ORAL at 13:35

## 2018-05-24 RX ADMIN — HEPARIN SODIUM 5000 UNITS: 5000 INJECTION INTRAVENOUS; SUBCUTANEOUS at 13:52

## 2018-05-24 RX ADMIN — ATORVASTATIN CALCIUM 80 MG: 80 TABLET, FILM COATED ORAL at 22:28

## 2018-05-24 RX ADMIN — COLCHICINE 0.6 MG: 0.6 TABLET, FILM COATED ORAL at 09:01

## 2018-05-24 RX ADMIN — DOCUSATE SODIUM 100 MG: 100 CAPSULE, LIQUID FILLED ORAL at 09:01

## 2018-05-24 RX ADMIN — POTASSIUM CHLORIDE 20 MEQ: 750 TABLET, FILM COATED, EXTENDED RELEASE ORAL at 20:59

## 2018-05-24 RX ADMIN — FUROSEMIDE 40 MG: 40 TABLET ORAL at 16:56

## 2018-05-24 RX ADMIN — CHLORHEXIDINE GLUCONATE 15 ML: 1.2 RINSE ORAL at 09:02

## 2018-05-24 RX ADMIN — INSULIN ASPART 6 UNITS: 100 INJECTION, SOLUTION INTRAVENOUS; SUBCUTANEOUS at 07:55

## 2018-05-24 RX ADMIN — ASPIRIN 81 MG: 81 TABLET, DELAYED RELEASE ORAL at 09:01

## 2018-05-24 RX ADMIN — ACETAMINOPHEN 650 MG: 325 TABLET, FILM COATED ORAL at 08:59

## 2018-05-24 RX ADMIN — SENNOSIDES 1 TABLET: 8.6 TABLET, FILM COATED ORAL at 09:01

## 2018-05-24 RX ADMIN — POLYETHYLENE GLYCOL 3350 17 GRAM ORAL POWDER PACKET 17 G: at 09:02

## 2018-05-24 RX ADMIN — AMIODARONE HYDROCHLORIDE 400 MG: 200 TABLET ORAL at 20:59

## 2018-05-24 RX ADMIN — MAGNESIUM OXIDE TAB 400 MG (240 MG ELEMENTAL MG) 400 MG: 400 (240 MG) TAB at 09:01

## 2018-05-24 RX ADMIN — HEPARIN SODIUM 5000 UNITS: 5000 INJECTION INTRAVENOUS; SUBCUTANEOUS at 21:09

## 2018-05-24 RX ADMIN — AMIODARONE HYDROCHLORIDE 400 MG: 200 TABLET ORAL at 13:45

## 2018-05-24 RX ADMIN — AMIODARONE HYDROCHLORIDE 400 MG: 200 TABLET ORAL at 09:01

## 2018-05-24 RX ADMIN — Medication 30 ML: at 13:35

## 2018-05-24 RX ADMIN — HEPARIN SODIUM 5000 UNITS: 5000 INJECTION INTRAVENOUS; SUBCUTANEOUS at 06:43

## 2018-05-24 RX ADMIN — LEVOTHYROXINE SODIUM 150 MCG: 150 TABLET ORAL at 06:43

## 2018-05-24 RX ADMIN — ALBUTEROL SULFATE 2.5 MG: 2.5 SOLUTION RESPIRATORY (INHALATION) at 03:07

## 2018-05-24 RX ADMIN — METOPROLOL SUCCINATE 25 MG: 25 TABLET, EXTENDED RELEASE ORAL at 22:27

## 2018-05-24 RX ADMIN — ALBUTEROL SULFATE 2.5 MG: 2.5 SOLUTION RESPIRATORY (INHALATION) at 13:45

## 2018-05-24 RX ADMIN — POTASSIUM CHLORIDE 20 MEQ: 750 TABLET, FILM COATED, EXTENDED RELEASE ORAL at 09:02

## 2018-05-24 RX ADMIN — SODIUM PHOSPHATE, MONOBASIC, MONOHYDRATE AND SODIUM PHOSPHATE, DIBASIC, ANHYDROUS 30 MMOL: 276; 142 INJECTION, SOLUTION INTRAVENOUS at 07:03

## 2018-05-24 RX ADMIN — SODIUM PHOSPHATE 1 ENEMA: 7; 19 ENEMA RECTAL at 13:08

## 2018-05-24 RX ADMIN — INSULIN GLARGINE 20 UNITS: 100 INJECTION, SOLUTION SUBCUTANEOUS at 21:09

## 2018-05-24 RX ADMIN — CHLORHEXIDINE GLUCONATE 15 ML: 1.2 RINSE ORAL at 20:59

## 2018-05-24 RX ADMIN — MAGNESIUM SULFATE HEPTAHYDRATE 2 G: 40 INJECTION, SOLUTION INTRAVENOUS at 04:24

## 2018-05-24 RX ADMIN — FUROSEMIDE 40 MG: 40 TABLET ORAL at 09:01

## 2018-05-24 RX ADMIN — FAMOTIDINE 20 MG: 20 TABLET ORAL at 09:01

## 2018-05-24 RX ADMIN — CHLORPROMAZINE HYDROCHLORIDE 25 MG: 25 TABLET, SUGAR COATED ORAL at 15:07

## 2018-05-24 RX ADMIN — FAMOTIDINE 20 MG: 20 TABLET ORAL at 21:00

## 2018-05-24 RX ADMIN — ALBUTEROL SULFATE 2.5 MG: 2.5 SOLUTION RESPIRATORY (INHALATION) at 20:59

## 2018-05-24 ASSESSMENT — ENCOUNTER SYMPTOMS
FEVER: 0
DIZZINESS: 0
VOMITING: 0
ALTERED MENTAL STATUS: 0
NAUSEA: 0
LIGHT-HEADEDNESS: 0
NEAR-SYNCOPE: 0
CHILLS: 0
SHORTNESS OF BREATH: 0
HEADACHES: 0
COUGH: 0
PALPITATIONS: 0
EYE PAIN: 0

## 2018-05-24 ASSESSMENT — COGNITIVE AND FUNCTIONAL STATUS - GENERAL
MOVING TO AND FROM BED TO CHAIR: 3 - A LITTLE
CLIMB 3 TO 5 STEPS WITH RAILING: 2 - A LOT
STANDING UP FROM CHAIR USING ARMS: 3 - A LITTLE
WALKING IN HOSPITAL ROOM: 3 - A LITTLE

## 2018-05-24 NOTE — PROGRESS NOTES
Cardiothoracic Intensivist Progress Note       CARDIOTHORACIC   Post-Operative Day # 4     Subjective     History of Present Illness: Maximus Daniel is a 78 y.o. cisgender male with history of known coronary artery disease prior to OR s/p CABG, chronic HTN, and hyperlipidemia that are being treated.                              Review of Systems:                                     Constitutional: no acute distress                                                  Eyes: denies difficulty with vision  Ears, nose, mouth, throat, and face: denies oral discomfort                                        Respiratory: denies shortness of breath                                  Cardiovascular: mild chest discomfort at incision site                                  Gastrointestinal: denies abdominal pain                                    Genitourinary: denies difficulty voiding                                      Hematologic: denies bleeding issues                                Musculoskeletal: denies muscle pain                                      Neurological: generalized weakness                                   Integumentary: denies rash      Medical History:   Past Medical History:   Diagnosis Date   • Coronary artery disease    • Diabetes mellitus type I (CMS/HCC) (HCC)    • Hypertension    • Hypothyroidism    • Incarcerated hernia    • Lipid disorder    • Myocardial infarction    • Nephrolithiasis        Surgical History:   Past Surgical History:   Procedure Laterality Date   • ADENOIDECTOMY     • FRACTURE SURGERY     • SKIN BIOPSY     • SKIN CANCER EXCISION     • TONSILLECTOMY         Allergies: Patient has no known allergies.    Social History:   Social History     Social History   • Marital status:      Spouse name: N/A   • Number of children: N/A   • Years of education: N/A     Occupational History   • retired     • retired [employer]       Social History Main Topics   • Smoking status: Current  Every Day Smoker     Packs/day: 0.25     Years: 65.00     Types: Cigarettes, Other (see comments)   • Smokeless tobacco: Never Used   • Alcohol use No   • Drug use: No   • Sexual activity: No     Other Topics Concern   • None     Social History Narrative    Lives alone in an apartment/efficiency.  Does have elevator.  Daughter is local and is power of        Family History:   Family History   Problem Relation Age of Onset   • Heart disease Mother    • Suicide Attempts Father    • Depression Father    • Heart disease Brother        Objective     Vital signs in last 24 hours:  Temp:  [36.4 °C (97.6 °F)-37.4 °C (99.3 °F)] 36.4 °C (97.6 °F)  Heart Rate:  [74-91] 84  Resp:  [18-25] 18  BP: ()/(55-66) 106/59  FiO2 (%) (Set):  [97 %-100 %] 97 %      Intake/Output Summary (Last 24 hours) at 05/24/18 1242  Last data filed at 05/24/18 0600   Gross per 24 hour   Intake           413.64 ml   Output             1200 ml   Net          -786.36 ml     Intake/Output this shift:  No intake/output data recorded.    Labs  Lab Results   Component Value Date    WBC 6.82 05/24/2018    HGB 9.0 (L) 05/24/2018    HCT 28.5 (L) 05/24/2018     05/24/2018    CHOL 152 05/19/2018    TRIG 101 05/19/2018    HDL 29 (L) 05/19/2018    ALT 53 05/18/2018     (H) 05/18/2018     05/24/2018    K 4.3 05/24/2018     05/24/2018    CREATININE 1.2 05/24/2018    BUN 20 05/24/2018    CO2 28 05/24/2018    INR 1.6 05/20/2018    HGBA1C 10.5 (H) 05/19/2018       Full Code    Head/Ear/Nose/Throat:     NCAT.                               Eyes:     EOMI and PERRL.                     Respiratory:     diminished at the bases b/l.               Cardiovascular:     RRR and normal S1, S2.              Gastrointestinal:     + Bowel sounds and non-tender.                 Genitourinary:     No deformities.                    Extremities:     trace edema b/l lower extremities.            Musculoskeletal:      No injury or deformity.         "           Neurological:     no focal deficits.       Behavior/Emotional:     appropriate and cooperative.                           Lymph:      No adenopathy noted.                               Skin:      Clean, dry and intact.     Examination of the patient performed at the bedside. Rounded with ICU team and discussed management/plan with surgical staff. Medications, radiological studies and labs reviewed and discussed with surgeon of record, Dr. Jeff Slater.    Cardiothoracic Assessment and Plan:    Neurologic: A&Ox3 and pain controlled. Will continue to optimize multi-modal pain regimen.     Cardiovascular: Severe CAD s/p CABGx4. Off pressors and meeting BP goals. Will continue to optimize cardiac medications.     Respiratory: I personally reviewed the CXR which again portrayed small lung volumes b/l with atelectasis. Will encourage IS, mobilization and wean O2 PRN.      Genitourinary: CKD stage 3. No medrano. Making adequate urine output. Will avoid nephrotoxic medications.     Endocrine: Follow [facility]. Hypothryoidism and DM management.     Hematologic: No evidence active bleeding. SQH for DVT ppx. \"Acute (expected) blood loss anemia from surgery.      Infectious Disease: No indication for antibiotics at this time.      Fluids, Electrolytes: Lytes replaced per protocol.     Gastrointestinal: PO as tolerated. GI ppx. Encourage Bm.     Skin: OOB, Pt.      Tubes, lines and drains: Will remove superfluous invasive monitors PRN.    Disposition: Doing well overall and OK for stepdown unit.           Brandon Arambula,   5/24/2018           "

## 2018-05-24 NOTE — PLAN OF CARE
Problem: Patient Care Overview  Goal: Plan of Care Review  Outcome: Ongoing (interventions implemented as appropriate)   05/24/18 0938   Coping/Psychosocial   Plan Of Care Reviewed With patient   Plan of Care Review   Progress improving     Goal: Interprofessional Rounds/Family Conf  Outcome: Ongoing (interventions implemented as appropriate)      Problem: Cardiac Surgery (Adult)  Goal: Signs and Symptoms of Listed Potential Problems Will be Absent, Minimized or Managed (Cardiac Surgery)  Outcome: Ongoing (interventions implemented as appropriate)      Problem: Pressure Ulcer Risk (Edison Scale) (Adult,Obstetrics,Pediatric)  Goal: Skin Integrity  Outcome: Ongoing (interventions implemented as appropriate)      Problem: Fall Risk (Adult)  Goal: Absence of Falls  Outcome: Ongoing (interventions implemented as appropriate)

## 2018-05-24 NOTE — PLAN OF CARE
Problem: Patient Care Overview  Goal: Plan of Care Review  Outcome: Ongoing (interventions implemented as appropriate)      Problem: Cardiac Surgery (Adult)  Goal: Signs and Symptoms of Listed Potential Problems Will be Absent, Minimized or Managed (Cardiac Surgery)  Outcome: Ongoing (interventions implemented as appropriate)      Problem: Pressure Ulcer Risk (Edison Scale) (Adult,Obstetrics,Pediatric)  Goal: Identify Related Risk Factors and Signs and Symptoms  Outcome: Ongoing (interventions implemented as appropriate)    Goal: Skin Integrity  Outcome: Ongoing (interventions implemented as appropriate)      Problem: Fall Risk (Adult)  Goal: Identify Related Risk Factors and Signs and Symptoms  Outcome: Ongoing (interventions implemented as appropriate)    Goal: Absence of Falls  Outcome: Ongoing (interventions implemented as appropriate)

## 2018-05-24 NOTE — PROGRESS NOTES
EMR reviewed. Pt transferred to SDU today. On 4L NC and off Dobutamine gtt. Pt working with Pt.  SW following for SNF. Family interested in [facility] or [facility]. All facilities following. [facility] is 5/27. The earliest [facility] can accept is 5/27. [facility] currently [facility] not have beds available. Awaiting response from [facility]. SW left  for yun Fuentes at [phone]. Will follow for transition of care needs.    Helga Colin, VIRIDIANAW  [id number]

## 2018-05-24 NOTE — PROGRESS NOTES
Patient: Maximus Daniel  Location: [facility] 2012  MRN: [id number]  Today's date: 5/24/2018    Pt seated in bedside chair at end of session w/ call bell and personal items in reach.          Pain/Vitals     Row Name 05/24/18 1018 05/24/18 1039       Pain/Comfort/Sleep    Presence of Pain denies  --    Preferred Pain Scale word (verbal rating pain scale)  --    Pain Rating: Rest 0 - no pain  --    Pain Rating: Activity 0 - no pain  --       Vital Signs    Pulse 74 84    Heart Rate Source Monitor Monitor    SpO2 97 % 96 %    Patient Activity At rest Walking    Oxygen Therapy Supplemental oxygen Supplemental oxygen    O2 Delivery Method Nasal cannula Nasal cannula    O2 Flow Rate (L/min) 6 L/min 6 L/min    /66 (!)  106/59    BP Location Left upper arm Left upper arm    BP Method Automatic Automatic    Patient Position Sitting Sitting          Prior Living Environment  Lives With: alone  Living Arrangements: apartment  Living Environment Comment: 2nd fl apt with elevator access Equipment Currently Used at Home: glucometer       Prior Level of Function  Ambulation: independent  Transferring: independent  Toileting: independent  Bathing: independent  Dressing: independent  Eating: independent  Communication: understands/communicates without difficulty  Swallowing: swallows foods/liquids without difficulty  Equipment Currently Used at Home: glucometer           PT Treatment Summary - 05/24/18 1018        Session Details    Document Type daily treatment    Mode of Treatment individual therapy;physical therapy       Time Calculation    Start Time 1018    Stop Time 1039    Time Calculation (min) 21 min       General Information    Patient Profile Reviewed? yes    Pertinent History of Current Functional Problem s/p CABG x 4    Existing Precautions/Restrictions cardiac;fall;sternal       Bed Chair WC Transfer Training    Sit-Stand Transfers, [place] minimum assist (75% patient effort);1  person assist;verbal cues    Verbal Cues (Sit-Stand Transfers) hand placement;maintaining precautions;safety;technique    Stand-Sit Transfers, [place] minimum assist (75% patient effort);1 person assist;verbal cues    Verbal Cues (Stand-Sit Transfers) hand placement;maintaining precautions;safety;technique       Gait Analysis/Training    Gait/Stairs Locomotion Gait Training (Group)       Gait Training    [place] minimum assist (75% or more patient effort);1 person assist;verbal cues    Assistive Device walker, front-wheeled    Distance in Feet 56 feet   56' x 2    Gait Pattern Utilized step-through    Gait Deviations Identified decreased mely;decreased gait speed;decreased step length;decreased stride length;decreased heel strike    Comment No retropulsion noted this session.   Able to progress amb distance.  Limited by decreased endurance and balance.       LE Seated Therapeutic Exercise    Exercise(s) Performed heel/toe raises;LAQ (long arc quad), knee extension;hip flexion    Exercise Type AROM (active range of motion)    Expected Outcomes improve functional tolerance, household activity;strengthen normal movement patterns;strengthen, facilitate independent active range of motion    Sets/Reps Detail 1/10    Comment vasodilation prior to amb trial       AM-PAC (TM) - Mobility    Turning from your back to your side while in a flat bed without using bedrails? 3 - A Little    Moving from lying on your back to sitting on the side of a flat bed without using bedrails? 3 - A Little    Moving to and from a bed to a chair? 3 - A Little    Standing up from a chair using your arms? 3 - A Little    To walk in a hospital room? 3 - A Little    Climbing 3-5 steps with a railing? 2 - A Lot    AM-PAC (TM) Mobility Score 17       PT Clinical Impression    Plan For Care Reviewed: Physical Therapy PT plan for care discussed with patient;patient voices agreement with PT plan for care    Rehab Potential/Prognosis good, to  achieve stated therapy goals    PT Frequency of Treatment 5-7 times per week    Problem List impaired balance;other (see comments)   decreased endurance    Anticipated Equipment Needs at Discharge other (see comments)   TBA at rehab    Expected Discharge Disposition skilled nursing facility    Daily Outcome Statement Pt cont to bel imited by decreased balance and endurance.  Would benefit from snf when med stable.                   Education provided this session. See the Patient Education summary report for full details.    PT Care Plan Goals    Flowsheet Row Most Recent Value   Bed Mobility Goal   Date Goal Established: Bed Mobility  05/21/18   Time to Achieve Goal: Bed Mobility  by discharge   Goal Activity: Bed Mobility  rolling to left, rolling to right, sit to supine/supine to sit   Goal Outcome Achieved: Bed Mobility  goal ongoing   Gait Training Goal   Date Goal Established: Gait Training  05/21/18   Time to Achieve Goal: Gait Training  by discharge   Level of [place] Goal: Gait Training  modified independence   Assistive Device Used: Gait Training  walker, rolling   Distance Goal: Gait Training (feet)  250 feet   Goal Outcome Achieved: Gait Training  goal ongoing   Goal Transfer Training   Date Goal Established: Transfer Training  05/21/18   Time to Achieve Goal: Transfer Training  by discharge   Goal Activity: Transfer Training  sit-to-stand/stand-to-sit, bed-to-chair/chair-to-bed   Transfer Training Goal, [place] Level  independent   Goal Outcome Achieved: Transfer Training  goal ongoing

## 2018-05-24 NOTE — PLAN OF CARE
Problem: Patient Care Overview  Goal: Plan of Care Review  Outcome: Ongoing (interventions implemented as appropriate)   05/24/18 1045   Coping/Psychosocial   Plan Of Care Reviewed With patient   Plan of Care Review   Progress progress toward functional goals as expected   Outcome Summary cont to be limited by decreased endurance and balance. Would benefit from snf when med stable.       Problem: Cardiac Surgery (Adult)  Goal: Signs and Symptoms of Listed Potential Problems Will be Absent, Minimized or Managed (Cardiac Surgery)  Outcome: Ongoing (interventions implemented as appropriate)      Problem: Acute Therapy Services Goal & Intervention Plan  Goal: Bed Mobility Goal  Outcome: Ongoing (interventions implemented as appropriate)    Goal: Gait Training Goal  Outcome: Ongoing (interventions implemented as appropriate)    Goal: Transfer Training Goal  Outcome: Ongoing (interventions implemented as appropriate)

## 2018-05-24 NOTE — DISCHARGE INSTR - OTHER ORDERS
Discuss plan to stay tobacco free with PCP at follow up appointment. Use resources provided to help stay smoke free.

## 2018-05-25 ENCOUNTER — APPOINTMENT (INPATIENT)
Dept: RADIOLOGY | Facility: HOSPITAL | Age: 78
DRG: 234 | End: 2018-05-25
Attending: NURSE PRACTITIONER
Payer: MEDICARE

## 2018-05-25 LAB
ANION GAP SERPL CALC-SCNC: 11 MEQ/L (ref 3–15)
BUN SERPL-MCNC: 19 MG/DL (ref 8–20)
CALCIUM SERPL-MCNC: 7.8 MG/DL (ref 8.9–10.3)
CHLORIDE SERPL-SCNC: 99 MMOL/L (ref 98–109)
CO2 SERPL-SCNC: 27 MMOL/L (ref 22–32)
CREAT SERPL-MCNC: 1.3 MG/DL (ref 0.8–1.3)
ERYTHROCYTE [DISTWIDTH] IN BLOOD BY AUTOMATED COUNT: 18.8 % (ref 11.6–14.4)
GFR SERPL CREATININE-BSD FRML MDRD: 53.1 ML/MIN/1.73M*2
GLUCOSE BLD-MCNC: 129 MG/DL (ref 70–99)
GLUCOSE BLD-MCNC: 131 MG/DL (ref 70–99)
GLUCOSE BLD-MCNC: 132 MG/DL (ref 70–99)
GLUCOSE BLD-MCNC: 141 MG/DL (ref 70–99)
GLUCOSE SERPL-MCNC: 113 MG/DL (ref 70–99)
HCT VFR BLDCO AUTO: 27 % (ref 40–51)
HGB BLD-MCNC: 8.6 G/DL (ref 13.7–17.5)
MAGNESIUM SERPL-MCNC: 2 MG/DL (ref 1.8–2.5)
MCH RBC QN AUTO: 26.5 PG (ref 28–33.2)
MCHC RBC AUTO-ENTMCNC: 31.9 G/DL (ref 32.2–36.5)
MCV RBC AUTO: 83.3 FL (ref 83–98)
PDW BLD AUTO: 10.6 FL (ref 9.4–12.4)
PHOSPHATE SERPL-MCNC: 2.9 MG/DL (ref 2.4–4.7)
PLATELET # BLD AUTO: 282 K/UL (ref 150–350)
POTASSIUM SERPL-SCNC: 4.1 MMOL/L (ref 3.6–5.1)
RBC # BLD AUTO: 3.24 M/UL (ref 4.5–5.8)
SODIUM SERPL-SCNC: 137 MMOL/L (ref 136–144)
WBC # BLD AUTO: 7.13 K/UL (ref 3.8–10.5)

## 2018-05-25 PROCEDURE — 80048 BASIC METABOLIC PNL TOTAL CA: CPT | Performed by: PHYSICIAN ASSISTANT

## 2018-05-25 PROCEDURE — 21400000 HC ROOM AND CARE CCU/INTERMEDIATE

## 2018-05-25 PROCEDURE — 25000000 HC PHARMACY GENERAL: Performed by: PHYSICIAN ASSISTANT

## 2018-05-25 PROCEDURE — 63700000 HC SELF-ADMINISTRABLE DRUG: Performed by: INTERNAL MEDICINE

## 2018-05-25 PROCEDURE — 97116 GAIT TRAINING THERAPY: CPT | Mod: GP

## 2018-05-25 PROCEDURE — 84100 ASSAY OF PHOSPHORUS: CPT | Performed by: PHYSICIAN ASSISTANT

## 2018-05-25 PROCEDURE — 63600000 HC DRUGS/DETAIL CODE: Performed by: NURSE PRACTITIONER

## 2018-05-25 PROCEDURE — 63700000 HC SELF-ADMINISTRABLE DRUG: Performed by: STUDENT IN AN ORGANIZED HEALTH CARE EDUCATION/TRAINING PROGRAM

## 2018-05-25 PROCEDURE — 63700000 HC SELF-ADMINISTRABLE DRUG: Performed by: PHYSICIAN ASSISTANT

## 2018-05-25 PROCEDURE — 71045 X-RAY EXAM CHEST 1 VIEW: CPT

## 2018-05-25 PROCEDURE — 200200 PR NO CHARGE: Performed by: THORACIC SURGERY (CARDIOTHORACIC VASCULAR SURGERY)

## 2018-05-25 PROCEDURE — 83735 ASSAY OF MAGNESIUM: CPT | Performed by: PHYSICIAN ASSISTANT

## 2018-05-25 PROCEDURE — 63700000 HC SELF-ADMINISTRABLE DRUG: Performed by: NURSE PRACTITIONER

## 2018-05-25 PROCEDURE — 85027 COMPLETE CBC AUTOMATED: CPT | Performed by: PHYSICIAN ASSISTANT

## 2018-05-25 PROCEDURE — 36415 COLL VENOUS BLD VENIPUNCTURE: CPT | Performed by: PHYSICIAN ASSISTANT

## 2018-05-25 RX ORDER — POTASSIUM CHLORIDE 1.5 G/1.58G
20 POWDER, FOR SOLUTION ORAL DAILY
Status: DISCONTINUED | OUTPATIENT
Start: 2018-05-25 | End: 2018-05-26 | Stop reason: HOSPADM

## 2018-05-25 RX ORDER — POTASSIUM CHLORIDE 750 MG/1
20 TABLET, FILM COATED, EXTENDED RELEASE ORAL DAILY
Status: DISCONTINUED | OUTPATIENT
Start: 2018-05-25 | End: 2018-05-25

## 2018-05-25 RX ORDER — FUROSEMIDE 40 MG/1
40 TABLET ORAL
Status: DISCONTINUED | OUTPATIENT
Start: 2018-05-25 | End: 2018-05-26 | Stop reason: HOSPADM

## 2018-05-25 RX ORDER — AMIODARONE HYDROCHLORIDE 200 MG/1
200 TABLET ORAL DAILY
Qty: 30 TABLET | Refills: 2 | Status: CANCELLED
Start: 2018-05-25 | End: 2018-06-24

## 2018-05-25 RX ORDER — AMIODARONE HYDROCHLORIDE 200 MG/1
200 TABLET ORAL DAILY
Status: DISCONTINUED | OUTPATIENT
Start: 2018-05-25 | End: 2018-05-26 | Stop reason: HOSPADM

## 2018-05-25 RX ORDER — FUROSEMIDE 10 MG/ML
40 INJECTION INTRAMUSCULAR; INTRAVENOUS DAILY
Status: DISCONTINUED | OUTPATIENT
Start: 2018-05-25 | End: 2018-05-25

## 2018-05-25 RX ADMIN — HEPARIN SODIUM 5000 UNITS: 5000 INJECTION INTRAVENOUS; SUBCUTANEOUS at 22:25

## 2018-05-25 RX ADMIN — FUROSEMIDE 40 MG: 40 TABLET ORAL at 15:15

## 2018-05-25 RX ADMIN — FUROSEMIDE 40 MG: 10 INJECTION, SOLUTION INTRAMUSCULAR; INTRAVENOUS at 08:40

## 2018-05-25 RX ADMIN — HEPARIN SODIUM 5000 UNITS: 5000 INJECTION INTRAVENOUS; SUBCUTANEOUS at 06:20

## 2018-05-25 RX ADMIN — ALBUTEROL SULFATE 2.5 MG: 2.5 SOLUTION RESPIRATORY (INHALATION) at 19:37

## 2018-05-25 RX ADMIN — INSULIN ASPART 6 UNITS: 100 INJECTION, SOLUTION INTRAVENOUS; SUBCUTANEOUS at 16:41

## 2018-05-25 RX ADMIN — HEPARIN SODIUM 5000 UNITS: 5000 INJECTION INTRAVENOUS; SUBCUTANEOUS at 12:59

## 2018-05-25 RX ADMIN — POTASSIUM CHLORIDE 20 MEQ: 1.5 POWDER, FOR SOLUTION ORAL at 08:40

## 2018-05-25 RX ADMIN — CHLORHEXIDINE GLUCONATE 15 ML: 1.2 RINSE ORAL at 19:37

## 2018-05-25 RX ADMIN — METOPROLOL SUCCINATE 50 MG: 50 TABLET, EXTENDED RELEASE ORAL at 22:23

## 2018-05-25 RX ADMIN — FAMOTIDINE 20 MG: 20 TABLET ORAL at 08:39

## 2018-05-25 RX ADMIN — ALBUTEROL SULFATE 2.5 MG: 2.5 SOLUTION RESPIRATORY (INHALATION) at 12:59

## 2018-05-25 RX ADMIN — ALBUTEROL SULFATE 2.5 MG: 2.5 SOLUTION RESPIRATORY (INHALATION) at 23:18

## 2018-05-25 RX ADMIN — ALUMINUM HYDROXIDE, MAGNESIUM HYDROXIDE, AND SIMETHICONE 30 ML: 200; 200; 20 SUSPENSION ORAL at 19:37

## 2018-05-25 RX ADMIN — LEVOTHYROXINE SODIUM 150 MCG: 150 TABLET ORAL at 06:20

## 2018-05-25 RX ADMIN — INSULIN ASPART 6 UNITS: 100 INJECTION, SOLUTION INTRAVENOUS; SUBCUTANEOUS at 11:45

## 2018-05-25 RX ADMIN — ALBUTEROL SULFATE 2.5 MG: 2.5 SOLUTION RESPIRATORY (INHALATION) at 08:40

## 2018-05-25 RX ADMIN — MAGNESIUM OXIDE TAB 400 MG (240 MG ELEMENTAL MG) 400 MG: 400 (240 MG) TAB at 08:39

## 2018-05-25 RX ADMIN — AMIODARONE HYDROCHLORIDE 400 MG: 200 TABLET ORAL at 12:59

## 2018-05-25 RX ADMIN — ATORVASTATIN CALCIUM 80 MG: 80 TABLET, FILM COATED ORAL at 23:15

## 2018-05-25 RX ADMIN — ALUMINUM HYDROXIDE, MAGNESIUM HYDROXIDE, AND SIMETHICONE 30 ML: 200; 200; 20 SUSPENSION ORAL at 23:15

## 2018-05-25 RX ADMIN — INSULIN ASPART 6 UNITS: 100 INJECTION, SOLUTION INTRAVENOUS; SUBCUTANEOUS at 08:49

## 2018-05-25 RX ADMIN — ASPIRIN 81 MG: 81 TABLET, DELAYED RELEASE ORAL at 08:41

## 2018-05-25 RX ADMIN — MAGNESIUM OXIDE TAB 400 MG (240 MG ELEMENTAL MG) 400 MG: 400 (240 MG) TAB at 19:37

## 2018-05-25 RX ADMIN — CHLORHEXIDINE GLUCONATE 15 ML: 1.2 RINSE ORAL at 08:40

## 2018-05-25 RX ADMIN — COLCHICINE 0.6 MG: 0.6 TABLET, FILM COATED ORAL at 08:41

## 2018-05-25 RX ADMIN — AMIODARONE HYDROCHLORIDE 400 MG: 200 TABLET ORAL at 08:39

## 2018-05-25 RX ADMIN — INSULIN GLARGINE 20 UNITS: 100 INJECTION, SOLUTION SUBCUTANEOUS at 22:30

## 2018-05-25 RX ADMIN — FAMOTIDINE 20 MG: 20 TABLET ORAL at 19:37

## 2018-05-25 ASSESSMENT — COGNITIVE AND FUNCTIONAL STATUS - GENERAL
CLIMB 3 TO 5 STEPS WITH RAILING: 2 - A LOT
MOVING TO AND FROM BED TO CHAIR: 3 - A LITTLE
WALKING IN HOSPITAL ROOM: 3 - A LITTLE
STANDING UP FROM CHAIR USING ARMS: 3 - A LITTLE

## 2018-05-25 NOTE — DISCHARGE INSTRUCTIONS
Discharge Instructions OPEN  DISCHARGE INSTRUCTIONS FOLLOWING CARDIAC SURGERY         Like any major operation, heart surgery is expected to drain a great deal of your strength and energy.  It requires patience, determination, and, most of all, TIME to recover fully.  Initially, you may be surprised by the amount of exertion which will cause you to stop and rest.  How you care for yourself after discharge is extremely important to your return to your pre-operative life style.       GUIDELINE FOR ACTIVITY     During your first week at home we strongly suggest that you have assistance from family and friends.  You can expect to have good days and bad days, so regulate activities according to what your body tells you.  Try to balance rest and activity, and when resting remember to keep your legs elevated.   It is important to continue to weigh yourself everyday, just as you were weighed daily in the hospital.  Try to weigh yourself every morning after going to the bathroom, using the same scale.  A gradual weight gain of 5 pounds in 3 to 5 days, or any increase in puffiness or swelling in fingers and ankles, should be reported to your doctor immediately.   It is also important to shower daily, in order to keep your incisions clean. Let the water run down and do not rub. No soap on your incisions.You should check your incision daily, and report any increased redness, drainage, or a temperature over 101 to your surgeon immediately.  A simple task of taking a shower can be very challenging the first week or two after surgery and you may find that you need some assistance.  A chair in the shower can be helpful if standing for a long period is too tiring.  A simple plastic lawn chair can be placed in the shower, or a small shower bench can be purchased at a local drug store.  A handheld shower head could also be helpful to use while sitting in the shower.  You may also find that you just need an extra set of hands to help  reach your back or wash your hair.   Getting dressed is another seemingly simple task that you may require assistance with during the first week or two following surgery.  It may be necessary to simplify your clothing for a while.  Sweatpants may be easier at first, they are easy to pull up, and you may find that your pre-op clothes are still a bit too snug.  Zip-up sweatshirts or button-up shirts are also easier, as are slip on shoes.  For women, a support bra must be worn, and will also help take the pressure off the chest incision.  A front hooking bra should be worn, as you will not be able to reach behind to fasten any hooks.  Support stockings will also be worn during the day only.  This is something that you will definitely need assistance with, as support stockings are difficult to put on and you are not allowed to pull/lift anything with your arms over 5 lbs.    As the weeks go on, you should slowly start feeling like your “old” self.  By the third week you should be showering independently, and dressing on your own.  You should avoid sleeping during the day, and continue with your daily walking program.  By the third week you should also start participating in your normal daily activities.  For example, if your family is preparing a meal, you could sit in the kitchen and help to chop the vegetables or get together the salad for dinner.  You could sit down and help fold the laundry.  By the fourth week you may feel ready to take a short trip to the grocery store, with someone else pushing the cart and reaching for the groceries, but you picking out the ingredients for dinner.    By the fourth or fifth week you could slowly resume driving.  When you do start to drive, start with short distances until you are comfortable behind the wheel, and always remember your seat belt.  You will also be ready at this time to join an outpatient cardiac rehab program.  We highly encourage outpatient cardiac rehab; there  your vital signs will be monitored by nurses and physical therapists while you work to meet your goal of exercising 30 min/day.  By the fourth or fifth week you should also be able to slowly resume sexual activity.  This can be frightening, however talk to your partner about your feelings.  Remember, no lifting anything over 5 pounds, and no upper extremity weight bearing!   Usually by week six you will be ready to return to work, as long as your job does not require any lifting.  The lifting restrictions remain in effect for 12 weeks!  It may have seemed like a long road, but by this time you are usually feeling pretty good!        STERNAL PRECAUTIONS    There is no lifting any more than 5 pounds for 12 weeks!  People do not usually realize how much this may affect them.  A gallon of milk weighs about 8 pounds.  This may also be a window that is hard to open, or a sliding glass door that is heavy to open.  There is no pushing or pulling with the arms, so no pushing a vacuum.  You can not use your arms when getting in/out of a chair or bed or car.  All these rules are in place to give your sternum time to completely heal.  Think of your sternum as a broken bone, and broken bones need time to heal.  Continue to use your heart pillow at home for the first week or two as a reminder not to use your arms.           RESPIRATORY/BREATHING     For the first week at home, continue to use the Incentive Spirometer that you were using in the hospital.  Try to use it 4-5 times each day, doing 10 puffs each time.  During the second week, you can decrease the amount to 3 times each day, doing 10 puffs each time.  Should any unusual shortness of breath occur at any time, notify your doctor immediately.       DIET    For the first week at home, eat what you want!  We just want to make sure that you eat!  It may take a while for your appetite to come back, so maybe six small meals a day will be easier than three large meals.  Also  try to limit your fluid intake to 6-7 cups per day.  Remember to weigh yourself daily and report any sudden weight gain.  By the third week you should start to follow a heart healthy diet.  This means avoiding high salt content foods, and eating a diet balanced with protein, fruit, vegetables, and fiber.        BOWELS    If you do not have a bowel movement within 2 days of being at home, try some prune juice or an over-the-counter laxative.  If there is still no bowel movement in 3 days, try an over-the-counter suppository.  If there is no bowel movement in the first week, notify your doctor immediately.       MEDICATIONS        At discharge from the hospital, your nurse went over all the medications you are going to take at home.  Remember to take your medications on time according to the schedule given to you by your nurse.       PAIN MANAGEMENT    You will be sent home with pain medication to use at home.  Remember to use your pain pills when you need them, don’t “tough it out”! Use your pain pills before activity, at bedtime, and when needed.  By the second or third week you may be able to use Extra Strength Tylenol for pain. Should you experience any pain not relieved by your pain pills, notify your doctor immediately.  Remember, no driving while taking prescription pain meds!       FAMILY    For the first week at home you should try to limit your visitors.  Entertaining even your closest friends and family may tire you out too much.  Remember that it is okay to excuse yourself when you are too tired.  During the second to third week you can start to increase your number of visitors as tolerated.  By the fourth week, enjoy yourself!   It is also important to mention that sometimes following surgery people become depressed.  Family and friends should be aware of signs of depression.  It is important to watch for changes in appetite, changes in sleep patterns, not wanting to see friends/family, not wanting to  participate in daily activities.  Any sign of depression should be reported to the doctor.       WHEN TO CALL THE DOCTOR    As previously mentioned, call the doctor for:   ~ Temperature over 101   ~ Increased redness or drainage from incisions   ~ Unusual shortness of breath   ~ No bowel movement in the first week   ~ Gradual weight gain of 5 pounds in 3-5 days   ~ Increased puffiness or swelling in fingers or ankles   ~ Pain not relieved by pain pills       FOR ANY QUESTIONS OR CONCERNS,     CALL SURGEON'S OFFICE:  [phone]

## 2018-05-25 NOTE — PROGRESS NOTES
Patient: Maximus Daniel  Location: [facility]  MRN: [id number]  Today's date: 5/25/2018    Pt seated in bedside chair at end of session w/ call bell and personal items in reach.  Chair alarm activated.   All pt needs met at this time.         Pain/Vitals     Row Name 05/25/18 0736 05/25/18 0752       Pain/Comfort/Sleep    Presence of Pain denies  --    Preferred Pain Scale word (verbal rating pain scale)  --    Pain Rating: Rest 0 - no pain  --    Pain Rating: Activity 0 - no pain  --       Vital Signs    Pulse 73 80    Heart Rate Source Monitor Monitor    SpO2 96 % 98 %    Patient Activity At rest Walking    Oxygen Therapy None (Room air) None (Room air)    BP  -- (!)  112/57    BP Location  -- Right upper arm    BP Method  -- Automatic    Patient Position  -- Sitting          Prior Living Environment  Lives With: alone  Living Arrangements: apartment  Living Environment Comment: 2nd fl apt with elevator access Equipment Currently Used at Home: glucometer       Prior Level of Function  Ambulation: independent  Transferring: independent  Toileting: independent  Bathing: independent  Dressing: independent  Eating: independent  Communication: understands/communicates without difficulty  Swallowing: swallows foods/liquids without difficulty  Equipment Currently Used at Home: glucometer           PT Treatment Summary - 05/25/18 0752        Session Details    Document Type daily treatment    Mode of Treatment individual therapy;physical therapy       Time Calculation    Start Time 0736    Stop Time 0752    Time Calculation (min) 16 min       General Information    Patient Profile Reviewed? yes    Pertinent History of Current Functional Problem s/p CABG x 4    Existing Precautions/Restrictions cardiac;fall;sternal       Bed Chair WC Transfer Training    Sit-Stand Transfers, [place] minimum assist (75% patient effort);1 person assist    Verbal Cues (Sit-Stand Transfers) hand placement    pt reaching for RW    Stand-Sit Transfers, [place] minimum assist (75% patient effort);1 person assist;verbal cues    Verbal Cues (Stand-Sit Transfers) hand placement;safety;technique;maintaining precautions       Gait Analysis/Training    Gait/Stairs Locomotion Gait Training (Group)       Gait Training    [place] minimum assist (75% or more patient effort);1 person assist;verbal cues    Assistive Device walker, front-wheeled    Distance in Feet 60 feet   60' x 2    Gait Pattern Utilized step-through    Gait Deviations Identified decreased mely;decreased gait speed;decreased step length;decreased stride length;decreased heel strike    Comment Pt progressing amb distance, remains limited by decreased endurance and balance.       LE Seated Therapeutic Exercise    Exercise(s) Performed heel/toe raises;LAQ (long arc quad), knee extension;hip flexion    Exercise Type AROM (active range of motion)    Expected Outcomes improve functional tolerance, household activity;strengthen normal movement patterns;strengthen, facilitate independent active range of motion    Sets/Reps Detail 1/10    Comment vasodilation prior to amb trial       UE Seated Therapeutic Exercise    Exercise(s) Performed shoulder flexion;other (see comments)   bicep curls    Exercise Type AROM (active range of motion)    Expected Outcomes improve functional tolerance, household activity;strengthen, facilitate independent active range of motion;strengthen normal movement patterns    Sets/Reps Detail 1/10    Comment vasodilation prior to amb trial       AM-PAC (TM) - Mobility    Turning from your back to your side while in a flat bed without using bedrails? 3 - A Little    Moving from lying on your back to sitting on the side of a flat bed without using bedrails? 3 - A Little    Moving to and from a bed to a chair? 3 - A Little    Standing up from a chair using your arms? 3 - A Little    To walk in a hospital room? 3 - A Little    Climbing 3-5  steps with a railing? 2 - A Lot    AM-PAC (TM) Mobility Score 17       PT Clinical Impression    Plan For Care Reviewed: Physical Therapy PT plan for care discussed with patient;patient voices agreement with PT plan for care    Rehab Potential/Prognosis good, to achieve stated therapy goals    PT Frequency of Treatment 5-7 times per week    Problem List impaired balance   decreased endurance    Anticipated Equipment Needs at Discharge other (see comments)   TBA at rehab    Expected Discharge Disposition skilled nursing facility    Daily Outcome Statement Pt cont to be limited by decreased endurance and balance.  [facility] Mob score indicates pt would benefit from snf when med stable.                   Education provided this session. See the Patient Education summary report for full details.    PT Care Plan Goals    Flowsheet Row Most Recent Value   Bed Mobility Goal   Date Goal Established: Bed Mobility  05/21/18   Time to Achieve Goal: Bed Mobility  by discharge   Goal Activity: Bed Mobility  rolling to left, rolling to right, sit to supine/supine to sit   Goal Outcome Achieved: Bed Mobility  goal ongoing   Gait Training Goal   Date Goal Established: Gait Training  05/21/18   Time to Achieve Goal: Gait Training  by discharge   Level of [place] Goal: Gait Training  modified independence   Assistive Device Used: Gait Training  walker, rolling   Distance Goal: Gait Training (feet)  250 feet   Goal Outcome Achieved: Gait Training  goal ongoing   Goal Transfer Training   Date Goal Established: Transfer Training  05/21/18   Time to Achieve Goal: Transfer Training  by discharge   Goal Activity: Transfer Training  sit-to-stand/stand-to-sit, bed-to-chair/chair-to-bed   Transfer Training Goal, [place] Level  independent   Goal Outcome Achieved: Transfer Training  goal ongoing

## 2018-05-25 NOTE — PROGRESS NOTES
Assessment/Plan       1)  STEMI (ST elevation myocardial infarction) (CMS/[facility]) ([facility])  - patient is now post op day 4 CABG x 4 with LIMA- LAD, SVG - Diag, SVG - Om1, SVG - Om2  - doing well off dobutamine  -  - amio TID with decrease to 200mg daily at discharge  - continue lasix - would use IV 40 1-2 times daily to optimize volume and reduce pulmonary edema on cxr  - currently on asa, consider plavix  - start low dose metoprolol succinate   statin    Subjective   Interval History: Denies shortness of breath.  Transferred to stepdown side.  Off drips.  Out of bed to the chair.  No orthopnea, PND.  Slight cough.  Incisional discomfort.    Review of Systems   Constitution: Negative for chills and fever.   Eyes: Negative for pain.   Cardiovascular: Negative for chest pain, leg swelling, near-syncope and palpitations.   Respiratory: Negative for cough and shortness of breath.    Gastrointestinal: Negative for nausea and vomiting.   Neurological: Negative for dizziness, headaches and light-headedness.   Psychiatric/Behavioral: Negative for altered mental status.     A 14-point review of systems was performed and was negative, or as documented above.  Scheduled Meds:    albuterol 2.5 mg nebulization q4h [facility]   amiodarone 400 mg oral TID   aspirin      aspirin 81 mg oral Daily   atorvastatin 80 mg oral Nightly   chlorhexidine 15 mL Mouth/Throat BID   colchicine 0.6 mg oral Daily   docusate sodium 100 mg oral BID   famotidine 20 mg oral BID   furosemide 40 mg oral BID (9a, 4p)   heparin (porcine)      heparin (porcine) 5,000 Units subcutaneous q8h [facility]   insulin aspart U-100 3-5 Units subcutaneous With meals & nightly   insulin aspart U-100 6 Units subcutaneous TID with meals   insulin glargine 20 Units subcutaneous Nightly   levothyroxine 150 mcg oral Daily (6:30a)   lidocaine PF      magnesium oxide 400 mg oral BID   metoprolol succinate XL 25 mg oral Nightly   Or      metoprolol succinate XL 50 mg oral Nightly   polyethylene  glycol 17 g oral Daily   potassium chloride 20 mEq oral BID   senna 1 tablet oral BID     Continuous Infusions:     PRN Meds:.•  acetaminophen  •  alum-mag hydroxide-simeth  •  bisacodyl  •  bisacodyl  •  magnesium hydroxide  •  ondansetron ODT **OR** ondansetron  •  oxyCODONE **OR** [DISCONTINUED] HYDROmorphone    Objective     Vital signs in last 24 hours:    Vitals:    05/24/18 1039 05/24/18 1200 05/24/18 1537 05/24/18 1917   BP: (!) 106/59  (!) 103/56 (!) 119/56   BP Location: Left upper arm   Right upper arm   Patient Position: Sitting  Lying Sitting   Pulse: 84  79 88   Resp:   (!) 22    Temp:   36.3 °C (97.4 °F) 36.8 °C (98.2 °F)   TempSrc:   Axillary Oral   SpO2: 96% 99% 99% 99%   Weight:       Height:             Intake/Output Summary (Last 24 hours) at 05/24/18 2214  Last data filed at 05/24/18 0600   Gross per 24 hour   Intake              150 ml   Output              400 ml   Net             -250 ml     Weights (last 7 days)     Date/Time   Weight   Drug Calculation Weight (Dosing Weight)    05/23/18 0600  92.5 kg (203 lb 14.8 oz)  --    05/22/18 0600  92.6 kg (204 lb 2.3 oz)  --    05/21/18 0600  90.5 kg (199 lb 8.3 oz)  --    05/20/18 1350  --  90 kg (198 lb 6.6 oz)    05/20/18 1342  90 kg (198 lb 6.6 oz)  --    05/19/18 0845  89.8 kg (198 lb)  --    05/18/18 1717  89.8 kg (198 lb)  --                  Physical Exam   Constitutional: He is oriented to person, place, and time. He appears well-developed and well-nourished.   HENT:   Head: Normocephalic and atraumatic.   Eyes: Conjunctivae and EOM are normal. Pupils are equal, round, and reactive to light.   Neck: Normal range of motion. Neck supple. No JVD present.   Cardiovascular: Normal rate and regular rhythm.    Pulmonary/Chest: Effort normal. No respiratory distress. He has no wheezes. He has rales.   Abdominal: Soft. Bowel sounds are normal. He exhibits no distension. There is no tenderness. There is no rebound.   Musculoskeletal: Normal range of  motion. He exhibits no edema.   Neurological: He is alert and oriented to person, place, and time.   Skin: Skin is warm and dry.   Psychiatric: He has a normal mood and affect. His behavior is normal. Judgment and thought content normal.       Labs    CBC Results       05/23/18 05/22/18 05/21/18                    0404 0309 1212         WBC 6.53 7.91 7.05         RBC 3.41 (L) 3.53 (L) 3.47 (L)         HGB 9.0 (L) 9.4 (L) 9.3 (L)         HCT 28.1 (L) 29.4 (L) 29.4 (L)         MCV 82.4 (L) 83.3 84.7         MCH 26.4 (L) 26.6 (L) 26.8 (L)         MCHC 32.0 (L) 32.0 (L) 31.6 (L)          209 203                       CMP Results       05/23/18 05/22/18 05/22/18                    0404 1303 0309          (L) - 135 (L)         K 4.3 4.1 4.4         Cl 103 - 103         CO2 25 - 21 (L)         Glucose 170 (H) - 147 (H)         BUN 23 (H) - 29 (H)         Creatinine 1.1 - 1.3         Calcium 8.1 (L) - 8.9         Anion Gap 7 - 11         EGFR &gt;60.0 - 53.3 (L)                         PT PTT Results       05/20/18 05/20/18 05/19/18                    1357 1209 2350         PT 19.1 (H) 20.6 (H) -         INR 1.6 1.7 -         PTT 30 30 48 (H)         Comment for INR at 1357 on 05/20/18:    Moderate Intensity Anticoagulation = 2.0 to 3.0, High Intensity = 2.5 to 3.5    Comment for INR at 1209 on 05/20/18:    Moderate Intensity Anticoagulation = 2.0 to 3.0, High Intensity = 2.5 to 3.5    Comment for PTT at 2350 on 05/19/18:    The Standard Therapeutic Range for Heparin is 68 to 101 seconds.          Troponin I Results       05/20/18 05/19/18 05/19/18                    0528 2350 1549         Troponin I 13.14 (HH) 18.10 (HH) 19.38 (HH)         Comment for Troponin I at 0528 on 05/20/18:  Consistent with previous results  Filtered to eliminate fibrin      Comment for Troponin I at 2350 on 05/19/18:  Consistent with previous results      Comment for Troponin I at 1549 on 05/19/18:  Consistent with previous results                   No results found for: TSH, C9YMINJ, H9ZQAGO, THYROIDAB        Cardiology Data    Telemetry  Sinus     Catheterization Results:  5/18/18  FINDINGS:  1. 80-90% proximal LAD and 70% mid LAD lesions.   2. 80% stenosis of the superior branch of OM 1 and 95% stenosis of the inferior branch of OM 1 (culprit lesion).  3. Non-obstructive disease in the RCA.      RECOMMENDATIONS:    1. Consult CT surgery to discuss revascularization options.   2. Monitor on heparin gtt in the CCU.   3. Continue with aggressive risk factor modification and medical therapy.   4. Check echocardiogram.     Echo Results:  TTE 5/19/18  · Low normal systolic function. Estimated EF = 50%. Grade I diastolic dysfunction.Hypokinesis of the lateral and inferolateral wall.  · Calcified aortic valve with mild aortic stenosis with mean gradient of 10mmHg.  · Normal-sized right ventricular cavity with preserved systolic function.  · Trace tricuspid valve regurgitation.  · Mild mitral valve regurgitation. Sclerotic mitral valve.  · Mildly dilated left atrium.      Radiology  CXR 5/22  IMPRESSION: Right upper extremity PICC line terminates within the superior vena  cava.     COMMENT: The current portable examination of the chest was compared to that  dated earlier on the same day.     In the interval, a right upper extremity PICC line has been inserted.  The  catheter tip terminates within the superior vena cava.     The Dumas-Phuc catheter has been removed in the interval.  The right jugular  central venous sheath remains in place.     Left chest tubes are in place with no evidence of pneumothorax.     Increasing pleural-parenchymal disease at the right lung base is noted with  increasing volume loss and small right pleural effusion..        Jasbir Aguila,    10:14 PM
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
## 2018-05-25 NOTE — PLAN OF CARE
Problem: Patient Care Overview  Goal: Plan of Care Review  Outcome: Ongoing (interventions implemented as appropriate)   05/25/18 0802   Coping/Psychosocial   Plan Of Care Reviewed With patient   Plan of Care Review   Progress progress toward functional goals as expected   Outcome Summary Pt leila to increase amb distance. Remains limited by decreased endurance and balance.       Problem: Cardiac Surgery (Adult)  Goal: Signs and Symptoms of Listed Potential Problems Will be Absent, Minimized or Managed (Cardiac Surgery)  Outcome: Ongoing (interventions implemented as appropriate)      Problem: Acute Therapy Services Goal & Intervention Plan  Goal: Bed Mobility Goal  Outcome: Ongoing (interventions implemented as appropriate)    Goal: Gait Training Goal  Outcome: Ongoing (interventions implemented as appropriate)    Goal: Transfer Training Goal  Outcome: Ongoing (interventions implemented as appropriate)

## 2018-05-25 NOTE — ASSESSMENT & PLAN NOTE
Monitor blood glucose  HbgA1C 10.5 on 5/19  Home Metformin on hold  Continue with Novolog and Lantus per Endocrine

## 2018-05-25 NOTE — PLAN OF CARE
Problem: Patient Care Overview  Goal: Plan of Care Review  Outcome: Ongoing (interventions implemented as appropriate)    Goal: Interprofessional Rounds/Family Conf  Outcome: Ongoing (interventions implemented as appropriate)      Problem: Cardiac Surgery (Adult)  Goal: Signs and Symptoms of Listed Potential Problems Will be Absent, Minimized or Managed (Cardiac Surgery)  Outcome: Ongoing (interventions implemented as appropriate)      Problem: Pressure Ulcer Risk (Edison Scale) (Adult,Obstetrics,Pediatric)  Goal: Identify Related Risk Factors and Signs and Symptoms  Outcome: Ongoing (interventions implemented as appropriate)    Goal: Skin Integrity  Outcome: Ongoing (interventions implemented as appropriate)      Problem: Fall Risk (Adult)  Goal: Identify Related Risk Factors and Signs and Symptoms  Outcome: Ongoing (interventions implemented as appropriate)    Goal: Absence of Falls  Outcome: Ongoing (interventions implemented as appropriate)

## 2018-05-25 NOTE — PLAN OF CARE
"Problem: Patient Care Overview  Goal: Discharge Needs Assessment  Outcome: Adequate for Discharge  Per medical rounds, pt will be medical stable for d/c on 5/26/2018. Plan is for d.c to SNF. JLUIS contacted [facility] [phone] and spoke with charge CARRILLO Toth who reports a bed is available. JLUIS contacted [facility] Gina [phone] who prefers for pt to transport via [facility] at private cost.     [facility] arranged via [facility] for 10am  ref# [phone]. RN report will go to [phone] \"3rd floor\". Fax instructions to [phone]. This info was told to Ambika VIZCAINO and left on pick communication card.      JLUIS spoke with RALPH Muñoz- made aware of transfer time. All controlled substances on hard paper scripts.     MD caring for pt at [facility]: Dr. Deshawn Matson [phone]      SW will remain available via on call pager [phone] for any additional transition of care needs.     CIRILO Starr  [id number]   05/21/18 1416 05/22/18 0848 05/22/18 1429   CT Needs Assessment   Concerns To Be Addressed --  --  --    Readmission Within The Last 30 Days no previous admission in last 30 days --  --    Provider Choice List(s) Given yes  (selects [facility]) --  --    Anticipated Discharge Disposition --  --  --    Community Agency Name(s) [facility] --  --    Current Discharge Risk --  --  chronically ill;dependent with mobility/activities of daily living;lives alone;lack of support system/caregiver;physical impairment   Activity/Self Care ROS   Equipment Currently Used at Home --  glucometer --    Current Health   Anticipated Changes Related to Illness --  --  inability to care for self    05/25/18 1426   CT Needs Assessment   Concerns To Be Addressed care coordination/care conferences;adjustment to diagnosis/illness concerns;decision making concerns;discharge planning concerns   Readmission Within The Last 30 Days --    Provider Choice List(s) Given --    Anticipated Discharge Disposition skilled nursing " facility   Community Agency Name(s) --    Current Discharge Risk --    Activity/Self Care ROS   Equipment Currently Used at Home --    Current Health   Anticipated Changes Related to Illness --

## 2018-05-25 NOTE — ASSESSMENT & PLAN NOTE
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

## 2018-05-25 NOTE — PLAN OF CARE
Problem: Patient Care Overview  Goal: Plan of Care Review  Outcome: Ongoing (interventions implemented as appropriate)   05/25/18 0640   Coping/Psychosocial   Plan Of Care Reviewed With patient   Plan of Care Review   Progress improving   Outcome Summary Patient was free from fall. Patient has been less confused throughout shift       Problem: Cardiac Surgery (Adult)  Goal: Signs and Symptoms of Listed Potential Problems Will be Absent, Minimized or Managed (Cardiac Surgery)  Outcome: Ongoing (interventions implemented as appropriate)      Problem: Fall Risk (Adult)  Goal: Identify Related Risk Factors and Signs and Symptoms  Outcome: Ongoing (interventions implemented as appropriate)    Goal: Absence of Falls  Outcome: Ongoing (interventions implemented as appropriate)

## 2018-05-25 NOTE — ASSESSMENT & PLAN NOTE
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

## 2018-05-26 ENCOUNTER — APPOINTMENT (INPATIENT)
Dept: RADIOLOGY | Facility: HOSPITAL | Age: 78
DRG: 234 | End: 2018-05-26
Attending: NURSE PRACTITIONER
Payer: MEDICARE

## 2018-05-26 VITALS
RESPIRATION RATE: 18 BRPM | TEMPERATURE: 98 F | DIASTOLIC BLOOD PRESSURE: 56 MMHG | BODY MASS INDEX: 33.52 KG/M2 | HEIGHT: 63 IN | SYSTOLIC BLOOD PRESSURE: 105 MMHG | HEART RATE: 65 BPM | OXYGEN SATURATION: 97 % | WEIGHT: 189.2 LBS

## 2018-05-26 LAB
ANION GAP SERPL CALC-SCNC: 9 MEQ/L (ref 3–15)
BUN SERPL-MCNC: 18 MG/DL (ref 8–20)
CALCIUM SERPL-MCNC: 8.2 MG/DL (ref 8.9–10.3)
CHLORIDE SERPL-SCNC: 99 MMOL/L (ref 98–109)
CO2 SERPL-SCNC: 30 MMOL/L (ref 22–32)
CREAT SERPL-MCNC: 1.3 MG/DL (ref 0.8–1.3)
ERYTHROCYTE [DISTWIDTH] IN BLOOD BY AUTOMATED COUNT: 18.7 % (ref 11.6–14.4)
GFR SERPL CREATININE-BSD FRML MDRD: 53.1 ML/MIN/1.73M*2
GLUCOSE BLD-MCNC: 86 MG/DL (ref 70–99)
GLUCOSE SERPL-MCNC: 94 MG/DL (ref 70–99)
HCT VFR BLDCO AUTO: 31.3 % (ref 40–51)
HGB BLD-MCNC: 9.8 G/DL (ref 13.7–17.5)
MAGNESIUM SERPL-MCNC: 2 MG/DL (ref 1.8–2.5)
MCH RBC QN AUTO: 26.3 PG (ref 28–33.2)
MCHC RBC AUTO-ENTMCNC: 31.3 G/DL (ref 32.2–36.5)
MCV RBC AUTO: 84.1 FL (ref 83–98)
PDW BLD AUTO: 10.4 FL (ref 9.4–12.4)
PLATELET # BLD AUTO: 362 K/UL (ref 150–350)
POTASSIUM SERPL-SCNC: 3.8 MMOL/L (ref 3.6–5.1)
RBC # BLD AUTO: 3.72 M/UL (ref 4.5–5.8)
SODIUM SERPL-SCNC: 138 MMOL/L (ref 136–144)
WBC # BLD AUTO: 7.81 K/UL (ref 3.8–10.5)

## 2018-05-26 PROCEDURE — 63700000 HC SELF-ADMINISTRABLE DRUG: Performed by: NURSE PRACTITIONER

## 2018-05-26 PROCEDURE — 63600000 HC DRUGS/DETAIL CODE: Performed by: NURSE PRACTITIONER

## 2018-05-26 PROCEDURE — 25000000 HC PHARMACY GENERAL: Performed by: PHYSICIAN ASSISTANT

## 2018-05-26 PROCEDURE — 36415 COLL VENOUS BLD VENIPUNCTURE: CPT | Performed by: PHYSICIAN ASSISTANT

## 2018-05-26 PROCEDURE — 80048 BASIC METABOLIC PNL TOTAL CA: CPT | Performed by: PHYSICIAN ASSISTANT

## 2018-05-26 PROCEDURE — 63700000 HC SELF-ADMINISTRABLE DRUG: Performed by: INTERNAL MEDICINE

## 2018-05-26 PROCEDURE — 71045 X-RAY EXAM CHEST 1 VIEW: CPT

## 2018-05-26 PROCEDURE — 83735 ASSAY OF MAGNESIUM: CPT | Performed by: PHYSICIAN ASSISTANT

## 2018-05-26 PROCEDURE — 63700000 HC SELF-ADMINISTRABLE DRUG: Performed by: STUDENT IN AN ORGANIZED HEALTH CARE EDUCATION/TRAINING PROGRAM

## 2018-05-26 PROCEDURE — 200200 PR NO CHARGE: Performed by: THORACIC SURGERY (CARDIOTHORACIC VASCULAR SURGERY)

## 2018-05-26 PROCEDURE — 85027 COMPLETE CBC AUTOMATED: CPT | Performed by: PHYSICIAN ASSISTANT

## 2018-05-26 PROCEDURE — 63700000 HC SELF-ADMINISTRABLE DRUG: Performed by: PHYSICIAN ASSISTANT

## 2018-05-26 RX ORDER — POTASSIUM CHLORIDE 1.5 G/1.58G
20 POWDER, FOR SOLUTION ORAL DAILY
Qty: 30 PACKET | Refills: 1
Start: 2018-05-26 | End: 2018-06-29 | Stop reason: HOSPADM

## 2018-05-26 RX ORDER — INSULIN ASPART 100 [IU]/ML
6 INJECTION, SOLUTION INTRAVENOUS; SUBCUTANEOUS
Qty: 5 PEN | Refills: 5
Start: 2018-05-26 | End: 2018-11-06

## 2018-05-26 RX ORDER — COLCHICINE 0.6 MG/1
0.6 TABLET ORAL DAILY
Qty: 7 TABLET | Refills: 0
Start: 2018-05-26 | End: 2018-11-06

## 2018-05-26 RX ORDER — INSULIN GLARGINE 100 [IU]/ML
20 INJECTION, SOLUTION SUBCUTANEOUS NIGHTLY
Qty: 5 PEN | Refills: 5
Start: 2018-05-26 | End: 2018-11-06

## 2018-05-26 RX ORDER — AMIODARONE HYDROCHLORIDE 200 MG/1
200 TABLET ORAL DAILY
Qty: 30 TABLET | Refills: 2
Start: 2018-05-26 | End: 2018-11-06 | Stop reason: ENTERED-IN-ERROR

## 2018-05-26 RX ORDER — FUROSEMIDE 40 MG/1
40 TABLET ORAL DAILY
Qty: 30 TABLET | Refills: 2
Start: 2018-05-26 | End: 2018-11-06

## 2018-05-26 RX ORDER — ASPIRIN 81 MG/1
81 TABLET ORAL DAILY
Qty: 30 TABLET | Refills: 2
Start: 2018-05-26 | End: 2018-11-06

## 2018-05-26 RX ORDER — LEVOTHYROXINE SODIUM 150 UG/1
150 TABLET ORAL
Qty: 30 TABLET | Refills: 2
Start: 2018-05-26 | End: 2018-11-06

## 2018-05-26 RX ORDER — INSULIN ASPART 100 [IU]/ML
3-5 INJECTION, SOLUTION INTRAVENOUS; SUBCUTANEOUS
Qty: 5 PEN | Refills: 5
Start: 2018-05-26 | End: 2018-11-06

## 2018-05-26 RX ORDER — METOPROLOL SUCCINATE 25 MG/1
25 TABLET, EXTENDED RELEASE ORAL NIGHTLY
Qty: 30 TABLET | Refills: 2
Start: 2018-05-26 | End: 2018-11-06

## 2018-05-26 RX ORDER — ATORVASTATIN CALCIUM 80 MG/1
80 TABLET, FILM COATED ORAL NIGHTLY
Qty: 30 TABLET | Refills: 2
Start: 2018-05-26 | End: 2018-11-06

## 2018-05-26 RX ORDER — ACETAMINOPHEN 325 MG/1
650 TABLET ORAL EVERY 6 HOURS PRN
Qty: 1 BOTTLE | Refills: 0
Start: 2018-05-26 | End: 2018-11-06

## 2018-05-26 RX ADMIN — ALBUTEROL SULFATE 2.5 MG: 2.5 SOLUTION RESPIRATORY (INHALATION) at 08:29

## 2018-05-26 RX ADMIN — ALBUTEROL SULFATE 2.5 MG: 2.5 SOLUTION RESPIRATORY (INHALATION) at 05:10

## 2018-05-26 RX ADMIN — POTASSIUM CHLORIDE 20 MEQ: 1.5 POWDER, FOR SOLUTION ORAL at 08:31

## 2018-05-26 RX ADMIN — FUROSEMIDE 40 MG: 40 TABLET ORAL at 08:30

## 2018-05-26 RX ADMIN — AMIODARONE HYDROCHLORIDE 200 MG: 200 TABLET ORAL at 08:29

## 2018-05-26 RX ADMIN — ASPIRIN 81 MG: 81 TABLET, DELAYED RELEASE ORAL at 08:29

## 2018-05-26 RX ADMIN — MAGNESIUM OXIDE TAB 400 MG (240 MG ELEMENTAL MG) 400 MG: 400 (240 MG) TAB at 08:30

## 2018-05-26 RX ADMIN — HEPARIN SODIUM 5000 UNITS: 5000 INJECTION INTRAVENOUS; SUBCUTANEOUS at 05:10

## 2018-05-26 RX ADMIN — LEVOTHYROXINE SODIUM 150 MCG: 150 TABLET ORAL at 05:10

## 2018-05-26 RX ADMIN — COLCHICINE 0.6 MG: 0.6 TABLET, FILM COATED ORAL at 08:29

## 2018-05-26 RX ADMIN — INSULIN ASPART 6 UNITS: 100 INJECTION, SOLUTION INTRAVENOUS; SUBCUTANEOUS at 08:01

## 2018-05-26 RX ADMIN — FAMOTIDINE 20 MG: 20 TABLET ORAL at 08:30

## 2018-05-26 RX ADMIN — CHLORHEXIDINE GLUCONATE 15 ML: 1.2 RINSE ORAL at 08:29

## 2018-05-26 RX ADMIN — ALUMINUM HYDROXIDE, MAGNESIUM HYDROXIDE, AND SIMETHICONE 30 ML: 200; 200; 20 SUSPENSION ORAL at 08:31

## 2018-05-26 NOTE — PROGRESS NOTES
Daily Progress Note    Subjective    Interval History: Pt resting comfortably wihtout complaint.  Ambulated x 4 yesterday.  Demonstrates IS to ~ 1250 ml.  Poor food intake- does not like hospital food. +BM    Current Facility-Administered Medications   Medication Dose Route Frequency Provider Last Rate Last Dose   • acetaminophen (TYLENOL) tablet 650 mg  650 mg oral q4h PRN SLY Enriquez   650 mg at 05/24/18 0859   • albuterol nebulizer solution 2.5 mg  2.5 mg nebulization q4h SLY Serra   2.5 mg at 05/25/18 2318   • alum-mag hydroxide-simeth (MAALOX) 200-200-20 mg/5 mL suspension 30 mL  30 mL oral q4h PRN SLY Enriquez   30 mL at 05/25/18 2315   • amiodarone (PACERONE) tablet 200 mg  200 mg oral Daily RALPH Shah       • aspirin 325 mg tablet  - Pyxis Override Pull            • aspirin enteric coated tablet 81 mg  81 mg oral Daily Jeremy C Boxer, MD   81 mg at 05/25/18 0841   • atorvastatin (LIPITOR) tablet 80 mg  80 mg oral Nightly SLY Enriquez   80 mg at 05/25/18 2315   • bisacodyl (DULCOLAX) 10 mg suppository 10 mg  10 mg rectal Daily PRN RALPH Singh   10 mg at 05/24/18 1130   • chlorhexidine (PERIDEX) 0.12 % mouthwash 15 mL  15 mL Mouth/Throat BID SLY Enriquez   15 mL at 05/25/18 1937   • colchicine (COLCRYS) tablet 0.6 mg  0.6 mg oral Daily RALPH Childers   0.6 mg at 05/25/18 0841   • docusate sodium (COLACE) capsule 100 mg  100 mg oral BID SLY Enriquez   100 mg at 05/24/18 0901   • famotidine (PEPCID) tablet 20 mg  20 mg oral BID SLY Enriquez   20 mg at 05/25/18 1937   • furosemide (LASIX) tablet 40 mg  40 mg oral BID (9a, 4p) RALPH Shah   40 mg at 05/25/18 1515   • heparin (porcine) 1,000 unit/mL injection  - Pyxis Override Pull            • heparin (porcine) 5,000 unit/mL injection 5,000 Units  5,000 Units subcutaneous q8h [facility] RALPH Childers   5,000 Units at 05/25/18 2225   • insulin  aspart U-100 (NovoLOG) pen 3-5 Units  3-5 Units subcutaneous With meals & nightly [facility], DO   3 Units at 05/23/18 0904   • insulin aspart U-100 (NovoLOG) pen 6 Units  6 Units subcutaneous TID with meals [facility], DO   6 Units at 05/25/18 1641   • insulin glargine (LANTUS SOLOSTAR) pen 20 Units  20 Units subcutaneous Nightly [facility], DO   20 Units at 05/25/18 2230   • levothyroxine (SYNTHROID) tablet 150 mcg  150 mcg oral Daily (6:30a) [facility], DO   150 mcg at 05/25/18 0620   • lidocaine PF (XYLOCAINE) 10 mg/mL (1 %) injection  - Pyxis Override Pull            • magnesium hydroxide (M.O.M.) 400 mg/5 mL suspension 30 mL  30 mL oral 2x daily PRN RALPH Singh   30 mL at 05/24/18 1335   • magnesium oxide (MAG-OX) tablet 400 mg  400 mg oral BID SLY Enriquez   400 mg at 05/25/18 1937   • metoprolol succinate XL (TOPROL-XL) 24 hr ER tablet 25 mg  25 mg oral Nightly SLY Enriquez   25 mg at 05/24/18 2227    Or   • metoprolol succinate XL (TOPROL-XL) 24 hr ER tablet 50 mg  50 mg oral Nightly SLY Enriquez   50 mg at 05/25/18 2223   • ondansetron ODT (ZOFRAN-ODT) disintegrating tablet 4 mg  4 mg oral q8h PRN SLY Enriquez        Or   • ondansetron (ZOFRAN) injection 4 mg  4 mg intravenous q8h PRN SLY Enriquez       • oxyCODONE (ROXICODONE) immediate release tablet 5-10 mg  5-10 mg oral q4h PRN SLY Lopez   10 mg at 05/23/18 2219   • polyethylene glycol (MIRALAX) 17 gram packet 17 g  17 g oral Daily RALPH Singh   17 g at 05/24/18 0902   • potassium chloride 20 mEq packet 20 mEq  20 mEq oral Daily RALPH Shah   20 mEq at 05/25/18 0840   • senna (SENOKOT) tablet 1 tablet  1 tablet oral BID SLY Enriquez   1 tablet at 05/24/18 0901     No Known Allergies      Objective    Vital signs in last 24 hours:  Temp:  [36.6 °C (97.9 °F)-37.1 °C (98.7 °F)] 36.7 °C (98 °F)  Heart Rate:   "[68-80] 68  Resp:  [16-18] 18  BP: ()/(52-65) 114/59      Intake/Output Summary (Last 24 hours) at 05/26/18 0109  Last data filed at 05/25/18 2100   Gross per 24 hour   Intake                0 ml   Output             1050 ml   Net            -1050 ml           Physical Exam:  BP (!) 114/59   Pulse 68   Temp 36.7 °C (98 °F) (Oral)   Resp 18   Ht 1.6 m (5' 3\")   Wt 87.3 kg (192 lb 6.4 oz)   SpO2 97%   BMI 34.08 kg/m²     General Appearance:   Neuro: Alert, cooperative, no distress, appears stated age    CNII-XII intact. Normal strength, sensation and reflexes throughout   Lungs:   Clear to auscultation bilaterally, respirations unlabored   Heart:  Regular rate and rhythm, S1 and S2 normal, no murmur, rub or gallop   Abdomen:   Soft, non-tender, bowel sounds active all four quadrants, no masses, no organomegaly   Extremities: Mild LE edema L > R, TEDs on bilat   Pulses: 2+ and symmetric all extremities   Skin:  Incisions:  Skin color, texture, turgor normal, no rashes or lesions  Clean, dry, and intact       Labs  Lab Results   Component Value Date    WBC 7.13 05/25/2018    HGB 8.6 (L) 05/25/2018    HCT 27.0 (L) 05/25/2018     05/25/2018    CHOL 152 05/19/2018    TRIG 101 05/19/2018    HDL 29 (L) 05/19/2018    ALT 53 05/18/2018     (H) 05/18/2018     05/25/2018    K 4.1 05/25/2018    CL 99 05/25/2018    CREATININE 1.3 05/25/2018    BUN 19 05/25/2018    CO2 27 05/25/2018    INR 1.6 05/20/2018    HGBA1C 10.5 (H) 05/19/2018       Imaging  I have independently reviewed the pertinent imaging from the last 24 hrs.    ECG/Telemetry  I have independently reviewed the telemetry. No events for the last 24 hours.    VTE Assessment: I have reassessed and the patient's VTE risk and treatment plan is appropriate.            Assessment/Plan   Hyperlipidemia   Assessment & Plan    Lipids done 5/19  Continue with statin          Hypothyroidism   Assessment & Plan    Continue synthroid       "   Incarcerated hernia   Assessment & Plan    General Surgery consulted - patient will follow up with General surgery as an outpatient  Monitor for S&S of strangulation        Type 2 diabetes mellitus (CMS/[facility]) ([facility])   Overview    DM 2 on invokana, Lantus and metformin therapies at home     Assessment & Plan    Monitor blood glucose  HbgA1C 10.5 on 5/19  Home Metformin on hold  Continue with Novolog and Lantus per Endocrine        * STEMI (ST elevation myocardial infarction) (CMS/[facility]) ([facility])   Assessment & Plan    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    SLY Salazar  5/26/2018        Daily Progress Note    No new subjective & objective note has been filed under this hospital service since the last note was generated.    Assessment & Plan  Hyperlipidemia   Assessment & Plan    Lipids done 5/19  Continue with statin          Hypothyroidism   Assessment & Plan    Continue synthroid         Incarcerated hernia   Assessment & Plan    General Surgery consulted - patient will follow up with General surgery as an outpatient  Monitor for S&S of strangulation        Type 2 diabetes mellitus (CMS/[facility]) ([facility])   Overview    DM 2 on invokana, Lantus and metformin therapies at home     Assessment & Plan    Monitor blood glucose  HbgA1C 10.5 on 5/19  Home Metformin on hold  Continue with Novolog and Lantus per Endocrine        * STEMI (ST elevation myocardial infarction) (CMS/[facility]) ([facility])   Assessment & Plan    Cardiac cath done on 5/18 revealed multivessel CAD  S/p CABG on 5/20 (LIMA to LAD, SVG to diag, Om1, Om2)  Continue with aspirin, statin and BB  Cardiology following   Continue  with Amio - ok to decrease to 200mg QD at discharge  Encourage IS/OOB/Ambulation  Pain management  Bowel protocol            Expected Discharge Date:  SNF today at 10 am if remains stable

## 2018-05-26 NOTE — PLAN OF CARE
Problem: Patient Care Overview  Goal: Plan of Care Review  Outcome: Ongoing (interventions implemented as appropriate)   05/26/18 0645   Coping/Psychosocial   Plan Of Care Reviewed With patient   Plan of Care Review   Progress improving   Outcome Summary Patient remained free from fall during shift. Skin remained clean and dry.        Problem: Cardiac Surgery (Adult)  Goal: Signs and Symptoms of Listed Potential Problems Will be Absent, Minimized or Managed (Cardiac Surgery)  Outcome: Ongoing (interventions implemented as appropriate)      Problem: Pressure Ulcer Risk (Edison Scale) (Adult,Obstetrics,Pediatric)  Goal: Skin Integrity  Outcome: Ongoing (interventions implemented as appropriate)      Problem: Fall Risk (Adult)  Goal: Absence of Falls  Outcome: Ongoing (interventions implemented as appropriate)  Patient remained free from fall during shift

## 2018-05-26 NOTE — NURSING NOTE
Confirmed with Emilee VIZCAINO at [facility] that faxed discharge instructions and summary of care were received.

## 2018-05-26 NOTE — NURSING NOTE
RN report given to Emilee VIZCAINO at [facility] at 0900. Pt is discharged and off unit via transport. Discharge instructions with Summary Of Care was faxed to number provided by Case Management.

## 2018-05-26 NOTE — DISCHARGE SUMMARY
Cardiac Surgery Discharge Summary    BRIEF OVERVIEW  Admitting Provider:  H&P Notes 4/26/2018 to 5/26/2018     Date of Service Author Author Type Status Note Type File Time    05/19/18 0532 Ignacio Rg MD Physician Signed H&P 05/19/18 1222    05/18/18 2337 SLY Bernstein Physician Assistant Cosign Needed H&P 05/18/18 3294          Attending Provider: Larisa Slater MD Attending phys phone: [phone]  Primary Care Physician at Discharge: Wilfredo Mahoney [phone]    Admission Date: 5/18/2018     Discharge Date: 5/26/2018    Primary Discharge Diagnosis  STEMI (ST elevation myocardial infarction) (CMS/[facility]) ([facility])    Secondary Discharge Diagnosis  Active Hospital Problems    Diagnosis Date Noted   • STEMI (ST elevation myocardial infarction) (CMS/HCC) ([facility]) 05/19/2018   • Type 2 diabetes mellitus (CMS/[facility]) ([facility]) 05/18/2018   • Incarcerated hernia 05/18/2018   • Hypothyroidism 05/18/2018   • Hyperlipidemia 05/18/2018   • Nephrolithiasis 05/18/2018   • Chest pain 05/18/2018   • NSTEMI (non-ST elevated myocardial infarction) (CMS/[facility]) ([facility]) 05/18/2018      Resolved Hospital Problems    Diagnosis Date Noted Date Resolved   • Chest pain 05/18/2018 05/19/2018     Priority: High       DETAILS OF HOSPITAL STAY    Operative Procedures Performed  Procedure(s):  CABG, POSS LIMA, RAMA, RAD ARTERY, EVH, MADELINE    Consults:   Consult Notes 4/26/2018 to 5/26/2018     Date of Service Author Author Type Status Note Type File Time    05/21/18 1812 Darrell Cage DO Physician Signed Consults 05/21/18 1825    05/21/18 1018 Rachelle Mario MD Physician Signed Consults 05/21/18 1033    05/19/18 0924 Lola Colon RD Registered Dietitian Signed Consults 05/19/18 0926    05/18/18 2032 Bell Witt MD Resident Attested Consults 05/19/18 1449    05/18/18 1929 Bell Witt MD Resident Shared Consults 05/18/18 1929          Consult Orders During Admission:  IP CONSULT TO GENERAL SURGERY  IP CONSULT TO  NUTRITION SERVICES  IP CONSULT TO ENDOCRINOLOGY  IP CONSULT TO NUTRITION SERVICES  IP CONSULT TO CARDIAC REHAB  IP CONSULT TO CASE MANAGEMENT  IP CONSULT TO PHYSICAL MEDICINE REHAB  IP CONSULT TO IV TEAM     Procedures: Coronary artery bypass grafting × 4  Pertinent Test Results:   CBC Results       05/26/18 05/25/18 05/24/18                    0255 0344 0328         WBC 7.81 7.13 6.82         RBC 3.72 (L) 3.24 (L) 3.39 (L)         HGB 9.8 (L) 8.6 (L) 9.0 (L)         HCT 31.3 (L) 27.0 (L) 28.5 (L)         MCV 84.1 83.3 84.1         MCH 26.3 (L) 26.5 (L) 26.5 (L)         MCHC 31.3 (L) 31.9 (L) 31.6 (L)          (H) 282 243                     BMP Results       05/26/18 05/25/18 05/24/18                    0255 0344 0328          137 137         K 3.8 4.1 4.3         Cl 99 99 100         CO2 30 27 28         Glucose 94 113 (H) 121 (H)         BUN 18 19 20         Creatinine 1.3 1.3 1.2         Calcium 8.2 (L) 7.8 (L) 8.1 (L)         Anion Gap 9 11 9         EGFR 53.1 (L) 53.1 (L) 58.3 (L)                     Imaging  Ct Chest Without Iv Contrast  Result Date: 5/19/2018  IMPRESSION: 1.  Moderate calcified coronary plaque. 2.  No acute cardiopulmonary abnormality. 3.  Moderate-sized hiatal hernia. _________     X-ray Chest 1 View  Result Date: 5/25/2018  IMPRESSION: Stable chest.    Ultrasound Carotid Bilateral  Result Date: 5/20/2018  IMPRESSION: 1. No evidence for hemodynamically significant stenosis within the bilateral carotid arteries. 2. Bilateral antegrade flow within the vertebral arteries.    Presenting Problem/History of Present Illness  Chest pain   77 y.o. male who presented to [facility] on 5/17 with complaint of chest pain and lower abd pain.  He states he was napping when pain first started.  He attempted to take TUMS without relief.  Chest pain did not radiate to jaw, arms, or back.  He did have associated diaphoresis. Also had abdominal pain across lower quadrants.  Denies  assoc N/V.    Pt states he had similar chest pain approx. 1 week ago which resolved after resting.  Has not had similar symptoms in the past.  Pt was found to have elevated troponin as well as left incarcerated hernia.  Pt transferred [place] [facility] [place] for cardiac cath which revealed MVD.  Cardiac surgical evaluation requested.    Exam on Day of Discharge  Patient seen and examined on day of discharge.  Physical Exam   Constitutional: He is oriented to person, place, and time. He appears well-developed and well-nourished.   HENT:   Head: Normocephalic and atraumatic.   Eyes: Conjunctivae are normal. Pupils are equal, round, and reactive to light.   Neck: Normal range of motion. Neck supple.   Cardiovascular: Normal rate, regular rhythm and normal heart sounds.    Pulmonary/Chest: Effort normal and breath sounds normal.   Abdominal: Soft. Bowel sounds are normal.   Musculoskeletal: Normal range of motion.   Neurological: He is alert and oriented to person, place, and time.   Skin: Skin is warm and dry.   Incisions: midsternal incision PAVAN with staples. Upper abdomen with sutures to be removed prior to dc. Right knee with dermabond c/d/i.    Hospital Course  78 year old male with PMH significant for CAD, Dm, HTN, lipids, hypothyroid and osteoarthritis. Patient was transferred from [facility] with abdominal and chest pain. An incarcerated abdominal fat and inguinal hernia were found. His troponins were elevated and patient was transferred to [facility] for a cardiac catheterization. On 5/18 patient had a cardiac cath which revealed, 80-90% pLAD and 70% mid LAD lesions, 80% stenosis of the superior branch of Om1 and 95% stenosis of the inferior branch of the Om1. On 5/20 patient went to the OR for a CABG x4 (LIMA to LAD, SVG to diag, SVG to Om1, SVG to Om2). Intraop, patient required blood products and arrived to the CTICU intubated on milrinone, Levo, Vaso, Precedex and Insulin. Epi was started while in the CTICU on POD #0.  Patient was soon extubated and an Amio bolus and gtt were given secondary to a burst of afib. On 5/21 patient was started on Dobuatmine and weaned off milrinone and vaso. PICC was inserted and patient was started on high-flow O2. 500cc Albumin was given for low Bps. On 5/22, IV lasix was given. On 5/23, chest tubes were removed, dobutamine was d/c'd and patients lines were taken out. On 5/24, patient was transferred to step-down. He had a slight change in mental status after Thorazine was given for hiccups. Mental status was improved on 5/25 and patient was stable and ready for discharge. On day of discharge patient is hemodynamically stable, ambulating in the hallway and oxygenating appropriately.     Discharge Orders  Released Discharge Orders     Order Details Provider Status    acetaminophen (TYLENOL) 325 mg tablet Take 2 tablets (650 mg total) by mouth every 6 (six) hours as needed for mild pain. RALPH Shah Prescribed    albuterol nebulizer solution 2.5 mg 2.5 mg, nebulization, Every 4 hours (2a, 6a, 10a, 2p, 6p, 10p), First dose on Mon 5/21/18 at 1415, For 90 days RALPH Shah Do Not Order at Discharge    alum-mag hydroxide-simeth (MAALOX) 200-200-20 mg/5 mL suspension 30 mL 30 mL, oral, Every 4 hours PRN, indigestion, Starting Sun 5/20/18 at 1340, For 90 days** Shake well before administration ** RALPH Shah Do Not Order at Discharge    amiodarone (PACERONE) 200 mg tablet Take 1 tablet (200 mg total) by mouth daily. RALPH Shah Prescribed    aspirin 81 mg enteric coated tablet Take 1 tablet (81 mg total) by mouth daily. RALPH Shah Prescribed    atorvastatin (LIPITOR) 80 mg tablet Take 1 tablet (80 mg total) by mouth nightly. RALPH Shah Prescribed    bisacodyl (DULCOLAX) 10 mg suppository 10 mg 10 mg, rectal, Daily PRN, constipation, Starting Thu 5/24/18 at 0827, For 90 days Brionna E Gebert, CRNP Do Not Order at Discharge    chlorhexidine (PERIDEX) 0.12 %  mouthwash 15 mL 15 mL, Mouth/Throat, 2 times daily, First dose on Sun 5/20/18 at 2000, For 90 days RALPH Shah Do Not Order at Discharge    colchicine (COLCRYS) 0.6 mg tablet Take 1 tablet (0.6 mg total) by mouth daily for 7 days. RALPH Shah Prescribed    docusate sodium (COLACE) capsule 100 mg 100 mg, oral, 2 times daily, First dose on Sun 5/20/18 at 2000, For 90 daysHold for diarrhea RALPH Shah Do Not Order at Discharge    famotidine (PEPCID) tablet 20 mg 20 mg, oral, 2 times daily, First dose on Sun 5/20/18 at 2000, For 90 days RALPH Shah Do Not Order at Discharge    furosemide (LASIX) 40 mg tablet Take 1 tablet (40 mg total) by mouth daily. RALPH Shah Prescribed    heparin (porcine) 5,000 unit/mL injection 5,000 Units 5,000 Units, subcutaneous, Every 8 hours, First dose on Mon 5/21/18 at 1400, For 90 days RALPH Shah Do Not Order at Discharge    insulin aspart U-100 (NovoLOG) 100 unit/mL subcutaneous pen Inject 6 Units under the skin 3 (three) times a day with meals. RALPH Shah Prescribed    insulin aspart U-100 (NovoLOG) 100 unit/mL subcutaneous pen Inject 3-5 Units under the skin 4 (four) times a day (with meals and nightly). RALPH Shah Prescribed    insulin glargine (LANTUS SOLOSTAR) 100 unit/mL (3 mL) subcutaneous pen Inject 20 Units under the skin nightly. RALPH Shah Prescribed    levothyroxine (SYNTHROID) 150 mcg tablet Take 1 tablet (150 mcg total) by mouth daily. RALPH Shah Prescribed    magnesium hydroxide (M.O.M.) 400 mg/5 mL suspension 30 mL 30 mL, oral, 2 times daily PRN, constipation, Starting Thu 5/24/18 at 1331, For 90 days** Shake well before administration ** RALPH Shah Do Not Order at Discharge    magnesium oxide (MAG-OX) tablet 400 mg 400 mg, oral, 2 times daily, First dose on Sun 5/20/18 at 2000, For 90 daysHold if Magnesium level >= 2.8 mg/dL and notify physician RALPH Shah Do Not Order  at Discharge    metoprolol succinate XL (TOPROL-XL) 24 hr ER tablet 50 mg 50 mg, oral, Nightly, First dose on Sun 5/20/18 at 2200, For 90 daysGive 25 mg for SBP < 110 mmHg. Give 50 mg for SBP >= 110 mmHg.  Start day of Surgery (Hold if on Dobutamine) Notify physician if held for any reason ** Do not crush, chew, or matthews **  RALPH Shah Do Not Order at Discharge    metoprolol succinate XL (TOPROL-XL) 25 mg 24 hr tablet Take 1 tablet (25 mg total) by mouth nightly. RALPH Shah Prescribed    ondansetron (ZOFRAN) injection 4 mg 4 mg, intravenous, Every 8 hours PRN, nausea, vomiting, Nausea/Vomiting, Starting Sun 5/20/18 at 1341, For 90 daysIf unable to take orally. RALPH Shah Do Not Order at Discharge    ondansetron ODT (ZOFRAN-ODT) disintegrating tablet 4 mg 4 mg, oral, Every 8 hours PRN, nausea, vomiting, Nausea/Vomiting, Starting Sun 5/20/18 at 1341, For 90 days RALPH Shah Do Not Order at Discharge    oxyCODONE (ROXICODONE) immediate release tablet 5-10 mg 5-10 mg, oral, Every 4 hours PRN, pain, Starting Mon 5/21/18 at 2330Start with 5mg; if pain not relieved, and patient not sedated, may increase subsequent doses. May be given with Acetaminophen/NSAIDs for multimodal analgesia.  May use hydromorphone IV if unable to take oral medication. RALPH Shah Do Not Order at Discharge    polyethylene glycol (MIRALAX) 17 gram packet 17 g 17 g, oral, Daily, First dose on Wed 5/23/18 at 0915, For 90 days RALPH Shah Do Not Order at Discharge    potassium chloride 20 mEq packet Take 20 mEq by mouth daily. RALPH Shah Prescribed    senna (SENOKOT) tablet 1 tablet 1 tablet, oral, 2 times daily, First dose on Sun 5/20/18 at 2000, For 90 daysHold for diarrhea RALPH Shah Do Not Order at Discharge    Activity:  As Tolerated Routine, Clinic Performed RALPH Shah New at Discharge    Discharge Condition:  Improving Routine, Clinic Performed RALPH Shah  New at Discharge    Discharge Summary:  Enclosed Routine, Clinic Performed RALPH Shah New at Discharge    Admission H&P Valid:  Yes Routine, Clinic Performed RALPH Shah New at Discharge    Patient Aware of Diagnosis: Yes Routine, Clinic Performed RALPH Shah New at Discharge          Outpatient Follow-Ups  Future Appointments  Date Time Provider Department Center   5/26/2018 9:00 AM Linsey Holt PTA [facility]   6/6/2018 11:15 AM [facility] CT PA [facility] None   6/29/2018 1:15 PM Larisa Slater MD [facility] None     Referrals:  No orders of the defined types were placed in this encounter.      Discharge Disposition  Dc to SNF 5/26  Code Status at Discharge: Full Code  Physician Order for Life-Sustaining Treatment Document Status      No documents found

## 2018-05-26 NOTE — PLAN OF CARE
Problem: Patient Care Overview  Goal: Plan of Care Review  Outcome: Outcome(s) Achieved Date Met: 05/26/18 05/26/18 0840   Coping/Psychosocial   Plan Of Care Reviewed With patient   Plan of Care Review   Progress improving     Goal: Individualization & Mutuality  Outcome: Outcome(s) Achieved Date Met: 05/26/18    Goal: Discharge Needs Assessment  Outcome: Outcome(s) Achieved Date Met: 05/26/18    Goal: Interprofessional Rounds/Family Conf  Outcome: Outcome(s) Achieved Date Met: 05/26/18      Problem: Cardiac Surgery (Adult)  Goal: Signs and Symptoms of Listed Potential Problems Will be Absent, Minimized or Managed (Cardiac Surgery)  Outcome: Outcome(s) Achieved Date Met: 05/26/18    Goal: Anesthesia/Sedation Recovery  Outcome: Outcome(s) Achieved Date Met: 05/26/18      Problem: Pressure Ulcer Risk (Edison Scale) (Adult,Obstetrics,Pediatric)  Goal: Identify Related Risk Factors and Signs and Symptoms  Outcome: Outcome(s) Achieved Date Met: 05/26/18    Goal: Skin Integrity  Outcome: Outcome(s) Achieved Date Met: 05/26/18      Problem: Ventilation, Mechanical Invasive (Adult)  Goal: Signs and Symptoms of Listed Potential Problems Will be Absent, Minimized or Managed (Ventilation, Mechanical Invasive)  Outcome: Outcome(s) Achieved Date Met: 05/26/18      Problem: Fall Risk (Adult)  Goal: Identify Related Risk Factors and Signs and Symptoms  Outcome: Outcome(s) Achieved Date Met: 05/26/18    Goal: Absence of Falls  Outcome: Outcome(s) Achieved Date Met: 05/26/18

## 2018-06-01 LAB
BASE EXCESS BLDA CALC-SCNC: -1.3 MMOL/L
CA-I BLD-SCNC: 1.12 MMOL/L (ref 1.15–1.27)
CO2 BLDA-SCNC: 23 MMOL/L (ref 22–32)
GLUCOSE BLDA-MCNC: 166 MG/DL (ref 65–95)
HCO3 BLDA-SCNC: 22 MMOL/L (ref 21–28)
HCT VFR BLDA CALC: 32 % (ref 36–48)
LACTATE BLDA-SCNC: 0.8 MMOL/L (ref 0.4–1.6)
PCO2 BLDA: 33 MMHG (ref 35–48)
PH BLDA: 7.44 PH (ref 7.35–7.45)
PO2 BLDA: 74 MMHG (ref 60–70)
POCT PATIENT TEMPERATURE: 98.6 F (ref 97–99)
POTASSIUM BLDA-SCNC: 3.6 MEQ/L (ref 3.4–4.5)
SAO2 % BLDA: 95 % (ref 93–98)
SODIUM BLDA-SCNC: 134 MEQ/L (ref 136–145)

## 2018-06-06 ENCOUNTER — OFFICE VISIT (OUTPATIENT)
Dept: CARDIOTHORACIC SURGERY | Facility: CLINIC | Age: 78
End: 2018-06-06
Payer: MEDICARE

## 2018-06-06 ENCOUNTER — TELEPHONE (OUTPATIENT)
Dept: CARDIOLOGY | Facility: CLINIC | Age: 78
End: 2018-06-06

## 2018-06-06 VITALS
OXYGEN SATURATION: 96 % | WEIGHT: 177.8 LBS | HEART RATE: 64 BPM | BODY MASS INDEX: 31.5 KG/M2 | TEMPERATURE: 97.7 F | RESPIRATION RATE: 16 BRPM | SYSTOLIC BLOOD PRESSURE: 100 MMHG | HEIGHT: 63 IN | DIASTOLIC BLOOD PRESSURE: 60 MMHG

## 2018-06-06 DIAGNOSIS — I25.10 CORONARY ARTERY DISEASE INVOLVING NATIVE CORONARY ARTERY WITHOUT ANGINA PECTORIS, UNSPECIFIED WHETHER NATIVE OR TRANSPLANTED HEART: Primary | ICD-10-CM

## 2018-06-06 PROCEDURE — 99024 POSTOP FOLLOW-UP VISIT: CPT | Performed by: NURSE PRACTITIONER

## 2018-06-06 NOTE — TELEPHONE ENCOUNTER
Nohemy pt - Pt seen in office today.  Madelin RN  From [facility] in [place] calling to verify why pt is taking amiodorone  Reviewed discharge and hospital notes instructed  pt had post op afib. Madelin asking if pt needs to remain on it. Madelin can be reached at [phone]

## 2018-06-06 NOTE — LETTER
June 11, 2018     Wilfredo Mahoney, DO  [address] [facility] [place] [address]    Patient: Maximus Daniel   YOB: 1940   Date of Visit: 6/6/2018       Dear Dr. Mahoney:    Thank you for referring Maximus Daniel to me for evaluation. Below are my notes for this consultation.    If you have questions, please do not hesitate to call me. I look forward to following your patient along with you.         Sincerely,        [facility]        CC: MD Yessenia Kim CRNP  6/11/2018 10:57 AM  Sign at close encounter  Subjective       Maximus Daniel is a 78 y.o. male seen in two-week follow-up status post CABG ×4 with LIMA to LAD, SVG to diagonal, SVG to OM1 and SVG to OM 2.  This was performed on 5/20/2018.  The patient has recovered well.  He denies chest pain, shortness of breath, palpitations and syncope.  He is here today for staple removal.  He is slowly increasing his activity.  His appetite is good.    1940  Past Medical History:   Diagnosis Date   • Coronary artery disease    • Diabetes mellitus type I (CMS/HCC) (HCC)    • Hypertension    • Hypothyroidism    • Incarcerated hernia    • Lipid disorder    • Myocardial infarction    • Nephrolithiasis      Social History     Social History   • Marital status:      Spouse name: N/A   • Number of children: N/A   • Years of education: N/A     Occupational History   • retired     • retired [employer]       Social History Main Topics   • Smoking status: Former Smoker     Packs/day: 0.25     Years: 65.00     Types: Cigarettes, Other (see comments)   • Smokeless tobacco: Never Used   • Alcohol use No   • Drug use: No   • Sexual activity: No     Other Topics Concern   • Not on file     Social History Narrative    Lives alone in an apartment/efficiency.  Does have elevator.  Daughter is local and is power of      Current Outpatient Prescriptions   Medication Sig Dispense Refill   • acetaminophen (TYLENOL) 325 mg tablet Take 2  tablets (650 mg total) by mouth every 6 (six) hours as needed for mild pain. 1 Bottle 0   • aspirin 81 mg enteric coated tablet Take 1 tablet (81 mg total) by mouth daily. 30 tablet 2   • atorvastatin (LIPITOR) 80 mg tablet Take 1 tablet (80 mg total) by mouth nightly. 30 tablet 2   • insulin aspart U-100 (NovoLOG) 100 unit/mL subcutaneous pen Inject 6 Units under the skin 3 (three) times a day with meals. 5 pen 5   • insulin aspart U-100 (NovoLOG) 100 unit/mL subcutaneous pen Inject 3-5 Units under the skin 4 (four) times a day (with meals and nightly). 5 pen 5   • insulin glargine (LANTUS SOLOSTAR) 100 unit/mL (3 mL) subcutaneous pen Inject 20 Units under the skin nightly. 5 pen 5   • levothyroxine (SYNTHROID) 150 mcg tablet Take 1 tablet (150 mcg total) by mouth daily. (Patient taking differently: Take 200 mcg by mouth daily.  ) 30 tablet 2   • potassium chloride 20 mEq packet Take 20 mEq by mouth daily. 30 packet 1   • amiodarone (PACERONE) 200 mg tablet Take 1 tablet (200 mg total) by mouth daily. 30 tablet 2   • furosemide (LASIX) 40 mg tablet Take 1 tablet (40 mg total) by mouth daily. 30 tablet 2   • metoprolol succinate XL (TOPROL-XL) 25 mg 24 hr tablet Take 1 tablet (25 mg total) by mouth nightly. 30 tablet 2     No current facility-administered medications for this visit.        The following have been reviewed and updated as appropriate in this visit:  Meds  Problems       Review of Systems   Constitutional: Negative for activity change, appetite change, fatigue and fever.   HENT: Negative.    Eyes: Negative.    Respiratory: Negative for cough, chest tightness, shortness of breath and wheezing.    Cardiovascular: Negative for chest pain, palpitations and leg swelling.   Gastrointestinal: Negative for abdominal pain, constipation, diarrhea and nausea.   Endocrine: Negative.    Genitourinary: Negative for difficulty urinating, frequency and urgency.   Musculoskeletal: Negative for arthralgias, back pain  and joint swelling.   Skin: Negative for rash and wound.   Neurological: Negative for dizziness, tremors, weakness and headaches.   Hematological: Negative for adenopathy. Does not bruise/bleed easily.   Psychiatric/Behavioral: Negative.        Objective   Vitals:    06/06/18 1112   BP: 100/60   Pulse: 64   Resp: 16   Temp: 36.5 °C (97.7 °F)   SpO2: 96%     Physical Exam   Constitutional: He is oriented to person, place, and time. He appears well-developed and well-nourished. No distress.   HENT:   Head: Normocephalic and atraumatic.   Cardiovascular: Normal rate, regular rhythm, normal heart sounds and normal pulses.  Exam reveals no gallop and no friction rub.    No murmur heard.  Pulmonary/Chest: Effort normal and breath sounds normal. No respiratory distress.   Abdominal: Soft. He exhibits no distension. There is no tenderness.   Musculoskeletal: Normal range of motion.   Neurological: He is alert and oriented to person, place, and time.   Skin: Skin is warm and dry. No rash noted.        Psychiatric: He has a normal mood and affect. His behavior is normal.   Vitals reviewed.      Assessment/Plan :    Maximus Daniel is a 78 y.o. male seen in two-week follow-up status post CABG ×4 with LIMA to LAD, SVG to diagonal, SVG to OM1 and SVG to OM 2.  This was performed on 5/20/2018.  His sternotomy incision is healing nicely and shows no signs of infection.  I removed his staples without incident.    I am pleased with Mr. Daniel's progress.  I reiterated lifting restrictions of no more than 5 pounds at this time.  I encouraged him to increase his activity and and walking.  I would like to see him in 2 weeks to track his progress at that time.  I encouraged him to call in the meantime with any questions or concerns.

## 2018-06-08 LAB
ISBT CODE: 600
PRODUCT CODE: NORMAL
PRODUCT STATUS: NORMAL
SPECIMEN EXP DATE BLD: NORMAL
UNIT ABO: NORMAL
UNIT ID: NORMAL
UNIT RH: NEGATIVE

## 2018-06-11 PROBLEM — I35.0 NONRHEUMATIC AORTIC VALVE STENOSIS: Status: ACTIVE | Noted: 2018-06-11

## 2018-06-11 PROBLEM — I25.2 CORONARY ARTERY DISEASE WITH HISTORY OF MYOCARDIAL INFARCTION WITHOUT HISTORY OF CABG: Status: ACTIVE | Noted: 2018-06-11

## 2018-06-11 PROBLEM — I25.10 CORONARY ARTERY DISEASE WITH HISTORY OF MYOCARDIAL INFARCTION WITHOUT HISTORY OF CABG: Status: ACTIVE | Noted: 2018-06-11

## 2018-06-11 PROBLEM — I21.4 NSTEMI (NON-ST ELEVATED MYOCARDIAL INFARCTION) (CMS/HCC): Status: RESOLVED | Noted: 2018-05-18 | Resolved: 2018-06-11

## 2018-06-11 PROBLEM — R07.9 CHEST PAIN: Status: RESOLVED | Noted: 2018-05-18 | Resolved: 2018-06-11

## 2018-06-11 PROBLEM — Z95.1 HISTORY OF CORONARY ARTERY BYPASS GRAFT: Status: ACTIVE | Noted: 2018-06-11

## 2018-06-11 ASSESSMENT — ENCOUNTER SYMPTOMS
ARTHRALGIAS: 0
CHEST TIGHTNESS: 0
SHORTNESS OF BREATH: 0
TREMORS: 0
WHEEZING: 0
BRUISES/BLEEDS EASILY: 0
ABDOMINAL PAIN: 0
FEVER: 0
DIZZINESS: 0
CONSTIPATION: 0
BACK PAIN: 0
HEADACHES: 0
NAUSEA: 0
ADENOPATHY: 0
DIFFICULTY URINATING: 0
FREQUENCY: 0
EYES NEGATIVE: 1
DIARRHEA: 0
ENDOCRINE NEGATIVE: 1
ACTIVITY CHANGE: 0
WOUND: 0
WEAKNESS: 0
COUGH: 0
JOINT SWELLING: 0
FATIGUE: 0
PALPITATIONS: 0
PSYCHIATRIC NEGATIVE: 1
APPETITE CHANGE: 0

## 2018-06-11 NOTE — PROGRESS NOTES
Subjective       Maximus Daniel is a 78 y.o. male seen in two-week follow-up status post CABG ×4 with LIMA to LAD, SVG to diagonal, SVG to OM1 and SVG to OM 2.  This was performed on 5/20/2018.  The patient has recovered well.  He denies chest pain, shortness of breath, palpitations and syncope.  He is here today for staple removal.  He is slowly increasing his activity.  His appetite is good.    1940  Past Medical History:   Diagnosis Date   • Coronary artery disease    • Diabetes mellitus type I (CMS/HCC) (HCC)    • Hypertension    • Hypothyroidism    • Incarcerated hernia    • Lipid disorder    • Myocardial infarction    • Nephrolithiasis      Social History     Social History   • Marital status:      Spouse name: N/A   • Number of children: N/A   • Years of education: N/A     Occupational History   • retired     • retired [employer]       Social History Main Topics   • Smoking status: Former Smoker     Packs/day: 0.25     Years: 65.00     Types: Cigarettes, Other (see comments)   • Smokeless tobacco: Never Used   • Alcohol use No   • Drug use: No   • Sexual activity: No     Other Topics Concern   • Not on file     Social History Narrative    Lives alone in an apartment/efficiency.  Does have elevator.  Daughter is local and is power of      Current Outpatient Prescriptions   Medication Sig Dispense Refill   • acetaminophen (TYLENOL) 325 mg tablet Take 2 tablets (650 mg total) by mouth every 6 (six) hours as needed for mild pain. 1 Bottle 0   • aspirin 81 mg enteric coated tablet Take 1 tablet (81 mg total) by mouth daily. 30 tablet 2   • atorvastatin (LIPITOR) 80 mg tablet Take 1 tablet (80 mg total) by mouth nightly. 30 tablet 2   • insulin aspart U-100 (NovoLOG) 100 unit/mL subcutaneous pen Inject 6 Units under the skin 3 (three) times a day with meals. 5 pen 5   • insulin aspart U-100 (NovoLOG) 100 unit/mL subcutaneous pen Inject 3-5 Units under the skin 4 (four) times a day (with  meals and nightly). 5 pen 5   • insulin glargine (LANTUS SOLOSTAR) 100 unit/mL (3 mL) subcutaneous pen Inject 20 Units under the skin nightly. 5 pen 5   • levothyroxine (SYNTHROID) 150 mcg tablet Take 1 tablet (150 mcg total) by mouth daily. (Patient taking differently: Take 200 mcg by mouth daily.  ) 30 tablet 2   • potassium chloride 20 mEq packet Take 20 mEq by mouth daily. 30 packet 1   • amiodarone (PACERONE) 200 mg tablet Take 1 tablet (200 mg total) by mouth daily. 30 tablet 2   • furosemide (LASIX) 40 mg tablet Take 1 tablet (40 mg total) by mouth daily. 30 tablet 2   • metoprolol succinate XL (TOPROL-XL) 25 mg 24 hr tablet Take 1 tablet (25 mg total) by mouth nightly. 30 tablet 2     No current facility-administered medications for this visit.        The following have been reviewed and updated as appropriate in this visit:  Meds  Problems       Review of Systems   Constitutional: Negative for activity change, appetite change, fatigue and fever.   HENT: Negative.    Eyes: Negative.    Respiratory: Negative for cough, chest tightness, shortness of breath and wheezing.    Cardiovascular: Negative for chest pain, palpitations and leg swelling.   Gastrointestinal: Negative for abdominal pain, constipation, diarrhea and nausea.   Endocrine: Negative.    Genitourinary: Negative for difficulty urinating, frequency and urgency.   Musculoskeletal: Negative for arthralgias, back pain and joint swelling.   Skin: Negative for rash and wound.   Neurological: Negative for dizziness, tremors, weakness and headaches.   Hematological: Negative for adenopathy. Does not bruise/bleed easily.   Psychiatric/Behavioral: Negative.        Objective   Vitals:    06/06/18 1112   BP: 100/60   Pulse: 64   Resp: 16   Temp: 36.5 °C (97.7 °F)   SpO2: 96%     Physical Exam   Constitutional: He is oriented to person, place, and time. He appears well-developed and well-nourished. No distress.   HENT:   Head: Normocephalic and atraumatic.    Cardiovascular: Normal rate, regular rhythm, normal heart sounds and normal pulses.  Exam reveals no gallop and no friction rub.    No murmur heard.  Pulmonary/Chest: Effort normal and breath sounds normal. No respiratory distress.   Abdominal: Soft. He exhibits no distension. There is no tenderness.   Musculoskeletal: Normal range of motion.   Neurological: He is alert and oriented to person, place, and time.   Skin: Skin is warm and dry. No rash noted.        Psychiatric: He has a normal mood and affect. His behavior is normal.   Vitals reviewed.      Assessment/Plan :    Maximus Daniel is a 78 y.o. male seen in two-week follow-up status post CABG ×4 with LIMA to LAD, SVG to diagonal, SVG to OM1 and SVG to OM 2.  This was performed on 5/20/2018.  His sternotomy incision is healing nicely and shows no signs of infection.  I removed his staples without incident.    I am pleased with Mr. Daniel's progress.  I reiterated lifting restrictions of no more than 5 pounds at this time.  I encouraged him to increase his activity and and walking.  I would like to see him in 2 weeks to track his progress at that time.  I encouraged him to call in the meantime with any questions or concerns.

## 2018-06-29 ENCOUNTER — OFFICE VISIT (OUTPATIENT)
Dept: CARDIOTHORACIC SURGERY | Facility: CLINIC | Age: 78
End: 2018-06-29
Payer: MEDICARE

## 2018-06-29 VITALS
WEIGHT: 173.5 LBS | HEIGHT: 63 IN | DIASTOLIC BLOOD PRESSURE: 70 MMHG | OXYGEN SATURATION: 98 % | HEART RATE: 65 BPM | RESPIRATION RATE: 18 BRPM | BODY MASS INDEX: 30.74 KG/M2 | SYSTOLIC BLOOD PRESSURE: 118 MMHG

## 2018-06-29 DIAGNOSIS — Z95.1 STATUS POST FOUR VESSEL CORONARY ARTERY BYPASS: Primary | ICD-10-CM

## 2018-06-29 PROCEDURE — 99024 POSTOP FOLLOW-UP VISIT: CPT | Performed by: THORACIC SURGERY (CARDIOTHORACIC VASCULAR SURGERY)

## 2018-06-29 RX ORDER — METFORMIN HYDROCHLORIDE 500 MG/1
TABLET ORAL
Refills: 3 | COMMUNITY
Start: 2018-03-29 | End: 2018-08-02 | Stop reason: ENTERED-IN-ERROR

## 2018-06-29 RX ORDER — ATORVASTATIN CALCIUM 80 MG/1
80 TABLET, FILM COATED ORAL DAILY
COMMUNITY
End: 2019-08-05

## 2018-06-29 RX ORDER — FUROSEMIDE 40 MG/1
40 TABLET ORAL DAILY
COMMUNITY
End: 2018-06-29 | Stop reason: HOSPADM

## 2018-06-29 RX ORDER — CANAGLIFLOZIN 300 MG/1
TABLET, FILM COATED ORAL
Refills: 3 | COMMUNITY
Start: 2018-03-31 | End: 2018-08-02 | Stop reason: ENTERED-IN-ERROR

## 2018-06-29 RX ORDER — ASPIRIN 325 MG
325 TABLET ORAL DAILY
COMMUNITY
End: 2018-08-02

## 2018-06-29 RX ORDER — LEVOTHYROXINE SODIUM 175 UG/1
175 TABLET ORAL
Refills: 3 | COMMUNITY
Start: 2018-05-11 | End: 2018-11-06

## 2018-06-29 RX ORDER — COLCHICINE 0.6 MG/1
0.6 TABLET ORAL DAILY
COMMUNITY
End: 2018-08-02 | Stop reason: ENTERED-IN-ERROR

## 2018-06-29 NOTE — LETTER
"July 3, 2018     Wilfredo Mahoney, DO  [address] [address]    Patient: Maximus Daniel   YOB: 1940   Date of Visit: 6/29/2018       Dear Dr. Mahoney:    Thank you for referring Maximus Daniel to me for evaluation. Below are my notes for this consultation.    If you have questions, please do not hesitate to call me. I look forward to following your patient along with you.         Sincerely,        Larisa Slater MD        CC: MD Amrita Kim PA C  7/2/2018 11:46 AM  Signed  CARDIOVASCULAR SURGERY FOLLOW-UP PROGRESS NOTE    Subjective   Patient is a 78 y.o. male who presents for follow up s/p CABG ×4 with LIMA to LAD, SVG to diagonal, SVG to OM1 and SVG to OM 2.  This was performed on 5/20/2018.  He comes in today complaining of minimal residual fatigue.  His activity level has been progressively improving.  He denies chest pain, shortness of breath, palpitations or lightheadedness.  His appetite is near baseline.  He denies any fevers or night sweats.       Objective   /70   Pulse 65   Resp 18   Ht 1.6 m (5' 3\")   Wt 78.7 kg (173 lb 8 oz)   SpO2 98%   BMI 30.73 kg/m²    Heart:  regular rate and rhythm, S1, S2 normal, no murmur, click, rub or gallop  Lungs:  clear to auscultation bilaterally  Extremities:  no edema  Incision(s): Sternotomy incision is well approximated and clean.  There is no sternal laxity, erythema or warmth noted on examination.    Assessment/Plan     Mr. Lorenzo is an 78-year-old male who presents for follow-up status post CABG ×4 with LIMA to LAD, SVG to diagonal, SVG to OM1 and SVG to OM 2, performed on 5/20/2018.  He is now at home and recovering as expected and denies any complaints of chest pain, shortness of breath, palpitations or lightheadedness.  His vital signs of remained stable and his blood pressure today was 118/70.  He does not have any signs of peripheral edema or difficulty breathing and therefore the Lasix " and potassium were discontinued during today's visit.  He was advised to contact me should he notice a 2-3 pound weight gain in the next couple of days or if he has noticed any peripheral swelling or difficulty breathing.  At this point we have recommended that he follow regularly with his cardiologist, Dr. Fernie Rodriguez.  He is surgically cleared.  He was advised to contact us with any questions or concerns in the meantime.

## 2018-07-02 NOTE — PROGRESS NOTES
"CARDIOVASCULAR SURGERY FOLLOW-UP PROGRESS NOTE    Subjective   Patient is a 78 y.o. male who presents for follow up s/p CABG ×4 with LIMA to LAD, SVG to diagonal, SVG to OM1 and SVG to OM 2.  This was performed on 5/20/2018.  He comes in today complaining of minimal residual fatigue.  His activity level has been progressively improving.  He denies chest pain, shortness of breath, palpitations or lightheadedness.  His appetite is near baseline.  He denies any fevers or night sweats.       Objective   /70   Pulse 65   Resp 18   Ht 1.6 m (5' 3\")   Wt 78.7 kg (173 lb 8 oz)   SpO2 98%   BMI 30.73 kg/m²   Heart:  regular rate and rhythm, S1, S2 normal, no murmur, click, rub or gallop  Lungs:  clear to auscultation bilaterally  Extremities:  no edema  Incision(s): Sternotomy incision is well approximated and clean.  There is no sternal laxity, erythema or warmth noted on examination.    Assessment/Plan     Mr. Lorenzo is an 78-year-old male who presents for follow-up status post CABG ×4 with LIMA to LAD, SVG to diagonal, SVG to OM1 and SVG to OM 2, performed on 5/20/2018.  He is now at home and recovering as expected and denies any complaints of chest pain, shortness of breath, palpitations or lightheadedness.  His vital signs of remained stable and his blood pressure today was 118/70.  He does not have any signs of peripheral edema or difficulty breathing and therefore the Lasix and potassium were discontinued during today's visit.  He was advised to contact me should he notice a 2-3 pound weight gain in the next couple of days or if he has noticed any peripheral swelling or difficulty breathing.  At this point we have recommended that he follow regularly with his cardiologist, Dr. Fernie Rodriguez.  He is surgically cleared.  He was advised to contact us with any questions or concerns in the meantime.  "

## 2018-07-26 NOTE — PROGRESS NOTES
Cardiology Note    French RING [facility],           Maximus Daniel is a 78 y.o. male 1940      He is here to establish cardiac care    In May 2018 [facility] had acute inferior infarction    [facility] and had bypass surgery with left internal mammary LAD vein diagonal vein to OM1 vein to OM 2 this is complicated by emma-op atrial fibrillation  Echocardiogram today for wall hypokinetic repeat her carotid 50% mild MR mild aortic stenosis  He is still at [facility] for rehab and care  He has mild aortic stenosis with mean gradient of 10 and ejection fraction 50% by echo in May he is treated for hypothyroidism and has a history of hernia with intermittent obstruction  He is also treated for diabetes hyperlipidemia and hypothyroidism    Prior to discharge he had his inguinal  hernia was evaluated by Dr. Wells felt it was a chronic issue, incarerated fat  and no urgent intervention was needed at this time    A new  Problem is  iron def anemia will be having  GI work up withDr French White    Patient Active Problem List    Diagnosis Date Noted   • Iron deficiency anemia 08/02/2018   • PAF (paroxysmal atrial fibrillation) (CMS/[facility]) ([facility]) 08/02/2018   • History of coronary artery bypass graft 06/11/2018   • Nonrheumatic aortic valve stenosis 06/11/2018   • Acute ST elevation myocardial infarction (STEMI) (CMS/[facility]) ([facility]) 05/19/2018   • Type 2 diabetes mellitus (CMS/[facility]) ([facility]) 05/18/2018   • Incarcerated hernia 05/18/2018   • Hypothyroidism 05/18/2018   • Hyperlipidemia 05/18/2018   • Nephrolithiasis 05/18/2018     Problem List     Type 2 diabetes mellitus (CMS/[facility]) ([facility])    Overview Addendum 5/19/2018  5:58 AM by Patricia Lucero MD     DM 2 on invokana, Lantus and metformin therapies at home         Incarcerated hernia    Hypothyroidism    Hyperlipidemia    Nephrolithiasis    Overview Signed 5/18/2018 11:37 PM by SLY Bernstein     History of kidney stone without requiring surgical  removal         Acute ST elevation myocardial infarction (STEMI) (CMS/[facility]) (HCC)    Overview Signed 6/11/2018  1:13 PM by Fernie Rodriguez MD     May 2018 acute inf mi om2 culprit lesion  cabg LIMA lad VEIN diat VEIn om1 vein om2  periop afib         History of coronary artery bypass graft    Overview Addendum 6/11/2018  1:09 PM by Fernie Rodriguez MD     may2018 after nonstemi   LIMA lad, SVG to diag, SVG om1, SVG om2  periop afib          Nonrheumatic aortic valve stenosis    Overview Signed 6/11/2018  1:06 PM by Fernie Rodriguez MD     Echo 5/18 ef 50% mild as mean grad 10               Allergy    Patient has no known allergies.          MED LIST       Current Outpatient Prescriptions   Medication Sig Dispense Refill   • amiodarone (PACERONE) 200 mg tablet Take 1 tablet (200 mg total) by mouth daily. 30 tablet 2   • aspirin 81 mg enteric coated tablet Take 81 mg by mouth daily.     • atorvastatin (LIPITOR) 80 mg tablet Take 80 mg by mouth daily.     • ferrous sulfate 325 mg (65 mg iron) tablet Take 65 mg by mouth daily with breakfast.     • insulin aspart U-100 (NovoLOG) 100 unit/mL injection Inject under the skin 3 (three) times a day before meals.     • insulin glargine (LANTUS SOLOSTAR) 100 unit/mL (3 mL) subcutaneous pen Inject under the skin nightly.     • levothyroxine (SYNTHROID) 175 mcg tablet Take 175 mcg by mouth once daily.    3   • metoprolol succinate XL (TOPROL-XL) 25 mg 24 hr tablet Take 25 mg by mouth daily.       No current facility-administered medications for this visit.                 Review of Systems   Constitution: Positive for weakness. Negative for malaise/fatigue, weight gain and weight loss.   HENT: Negative for hearing loss and hoarse voice.    Eyes: Negative for visual disturbance.   Cardiovascular: Negative for chest pain, claudication, cyanosis, dyspnea on exertion, irregular heartbeat, leg swelling, near-syncope, orthopnea, palpitations, paroxysmal nocturnal dyspnea and syncope.  "  Respiratory: Negative for cough, hemoptysis, shortness of breath, sleep disturbances due to breathing, snoring, sputum production and wheezing.    Endocrine: Negative for cold intolerance and heat intolerance.   Hematologic/Lymphatic: Negative.  Negative for bleeding problem. Does not bruise/bleed easily.   Skin: Negative.  Negative for rash.   Musculoskeletal: Negative for arthritis, falls, joint pain, muscle cramps and myalgias.   Gastrointestinal: Negative for abdominal pain, anorexia, change in bowel habit, constipation, diarrhea, dysphagia, heartburn, jaundice and nausea.   Genitourinary: Negative for frequency and nocturia.   Neurological: Negative for dizziness, focal weakness, headaches, light-headedness, numbness, tremors and vertigo.   Psychiatric/Behavioral: Negative for memory loss. The patient does not have insomnia and is not nervous/anxious.    Allergic/Immunologic: Negative for hives.       Labs   Lab Results   Component Value Date    WBC 7.81 05/26/2018    HGB 9.8 (L) 05/26/2018    HCT 31.3 (L) 05/26/2018     (H) 05/26/2018    CHOL 152 05/19/2018    TRIG 101 05/19/2018    HDL 29 (L) 05/19/2018    LDLCALC 103 (H) 05/19/2018    ALT 53 05/18/2018     (H) 05/18/2018     05/26/2018    K 3.8 05/26/2018    CL 99 05/26/2018    CREATININE 1.3 05/26/2018    BUN 18 05/26/2018    CO2 30 05/26/2018    INR 1.6 05/20/2018    HGBA1C 10.5 (H) 05/19/2018       Lab Results   Component Value Date    GLUCOSE 94 05/26/2018    CALCIUM 8.2 (L) 05/26/2018     05/26/2018    K 3.8 05/26/2018    CO2 30 05/26/2018    CL 99 05/26/2018    BUN 18 05/26/2018    CREATININE 1.3 05/26/2018         Objective   Vitals:    08/02/18 1317   BP: 102/60   BP Location: Left upper arm   Patient Position: Sitting   Pulse: 61   SpO2: 97%   Weight: 78.5 kg (173 lb)   Height: 1.6 m (5' 3\")     Physical Exam   Constitutional: He is oriented to person, place, and time. He appears well-developed and well-nourished. He is " active.  Non-toxic appearance. He does not have a sickly appearance. He does not appear ill. No distress. He is not intubated.   HENT:   Head: Normocephalic. Not microcephalic. Head is without raccoon's eyes, without abrasion and without contusion.   Nose: Nose normal.   Eyes: EOM are normal. Left eye exhibits no discharge and no exudate. Right conjunctiva is not injected. Left conjunctiva is not injected. Left conjunctiva has no hemorrhage. No scleral icterus. Pupils are equal.   Neck: Normal range of motion. Neck supple. Normal carotid pulses and no hepatojugular reflux present. Carotid bruit is not present.   Cardiovascular: Normal rate, regular rhythm, normal heart sounds, intact distal pulses and normal pulses.  PMI is not displaced.  Exam reveals no gallop and no friction rub.    No murmur heard.  1/6 systolic murmur   Pulmonary/Chest: Effort normal and breath sounds normal. No stridor. No apnea, no tachypnea and no bradypnea. He is not intubated. No respiratory distress. He has no wheezes. He has no rales. He exhibits no tenderness.   Abdominal: Soft. Normal appearance, normal aorta and bowel sounds are normal. He exhibits no distension. There is no tenderness.   Musculoskeletal: Normal range of motion. He exhibits no edema or deformity.   Walks with walker   Neurological: He is alert and oriented to person, place, and time.   Skin: No abrasion, no bruising, no ecchymosis and no rash noted. He is not diaphoretic. No erythema. No pallor.   Psychiatric: He has a normal mood and affect. His mood appears not anxious. His affect is not angry, not blunt, not labile and not inappropriate. His speech is not slurred. He is not agitated, not aggressive, not withdrawn and not combative. He does not exhibit a depressed mood. He is communicative.         Cardiac Procedures      CATH  5/18 90% PROX LAD MID 70%, Om1 80%, INF BRANCH 95% RCA NON OBSTRUCTIVE DISEASE      CV SURGERY     CABG x 4 5/18 AFTER NONSTEM limA LAD v TO  DIAG, v Om1, v Om1   PERIO AFIB         ELECTROPHYSIOLOGY      paf perio op cabg 5/18    ECHO    5/18 ef 50% mild as mean grad 10    7/18 ef 50% inf apical akinetic ef 50% mild mr mild as      EKG  nsr on spec st seg flattening    Assessment/Plan:      Acute ST elevation myocardial infarction (STEMI) (CMS/[facility]) ([facility])  Beginning of May he had acute inferior infarction catheterization revealed multivessel disease he underwent bypass surgery left internal mammary LAD, vein to diagonal, vein OM1, vein OM 2.  He had perioperative atrial fibrillation remains on amiodarone at present clinically sinus rhythm no further angina  Stable ejection fraction low normal 50% mildly as mild MR inferoapical hypokinesis    Nonrheumatic aortic valve stenosis  Mild aortic stenosis mean gradient 10    Incarcerated hernia  Not causing pain at the moment felt to be chronically incarcerated with past that is felt to be chronically incarcerated was evaluated by Dr. Wells no need for urgent surgery    Type 2 diabetes mellitus (CMS/[facility]) ([facility])  On insulin followed by you    Iron deficiency anemia  Going to have creatinine GI workup with Dr. French White at [facility] from a cardiac standpoint he is stable to have this done with fresh myocardial infarction and recent bypasses would recommend not interrupting aspirin therapy if possible prior to GI workup    PAF (paroxysmal atrial fibrillation) (CMS/[facility]) ([facility])  Had emma-op atrial fibrillation presently sinus rhythm continue amiodarone for now may stop in a few months         From a cardiac stable to undergo endoscopy studies for his ironr deficiency anemia  If possible would recommend not interrupting aspirin therapy with his history of myocardial infarction in May and recent bypass surgery  I will see her back in 3 months with full lab work prior to  He will begin cardiac outpatient rehab probably at [facility]  At his next Visit we will probably stop amiodarone at that time    Thank you for allowing  me to participate in the care of this patient.  I hope this information is helpful.    Addendum    Cardiac status stable to undergo laparoscopic colectomy with Dr. Hurt    This letter was generated using speech recognition software.  Please excuse any typographical errors.  Fernie Rodriguez MD [facility]   8/2/2018  Cc French Garcia, DO

## 2018-08-02 ENCOUNTER — HOSPITAL ENCOUNTER (OUTPATIENT)
Dept: CARDIOLOGY | Facility: CLINIC | Age: 78
Discharge: HOME | End: 2018-08-02
Payer: MEDICARE

## 2018-08-02 ENCOUNTER — TELEPHONE (OUTPATIENT)
Dept: SCHEDULING | Facility: CLINIC | Age: 78
End: 2018-08-02

## 2018-08-02 ENCOUNTER — OFFICE VISIT (OUTPATIENT)
Dept: CARDIOLOGY | Facility: CLINIC | Age: 78
End: 2018-08-02
Payer: MEDICARE

## 2018-08-02 VITALS
HEIGHT: 63 IN | DIASTOLIC BLOOD PRESSURE: 60 MMHG | HEART RATE: 61 BPM | WEIGHT: 164 LBS | SYSTOLIC BLOOD PRESSURE: 102 MMHG | BODY MASS INDEX: 29.06 KG/M2 | OXYGEN SATURATION: 97 %

## 2018-08-02 VITALS — HEIGHT: 63 IN | BODY MASS INDEX: 30.65 KG/M2 | WEIGHT: 173 LBS

## 2018-08-02 DIAGNOSIS — E11.8 TYPE 2 DIABETES MELLITUS WITH COMPLICATION, WITHOUT LONG-TERM CURRENT USE OF INSULIN (CMS/HCC): ICD-10-CM

## 2018-08-02 DIAGNOSIS — I35.0 NONRHEUMATIC AORTIC VALVE STENOSIS: ICD-10-CM

## 2018-08-02 DIAGNOSIS — I21.21 ACUTE ST ELEVATION MYOCARDIAL INFARCTION (STEMI) INVOLVING LEFT CIRCUMFLEX CORONARY ARTERY (CMS/HCC): Primary | ICD-10-CM

## 2018-08-02 DIAGNOSIS — E03.9 HYPOTHYROIDISM, UNSPECIFIED TYPE: ICD-10-CM

## 2018-08-02 DIAGNOSIS — E78.5 HYPERLIPIDEMIA, UNSPECIFIED HYPERLIPIDEMIA TYPE: ICD-10-CM

## 2018-08-02 DIAGNOSIS — I21.21 ACUTE ST ELEVATION MYOCARDIAL INFARCTION (STEMI) INVOLVING LEFT CIRCUMFLEX CORONARY ARTERY (CMS/HCC): ICD-10-CM

## 2018-08-02 PROBLEM — I48.0 PAF (PAROXYSMAL ATRIAL FIBRILLATION) (CMS/HCC): Status: ACTIVE | Noted: 2018-08-02

## 2018-08-02 PROBLEM — D50.9 IRON DEFICIENCY ANEMIA: Status: ACTIVE | Noted: 2018-08-02

## 2018-08-02 LAB
AORTIC ROOT ANNULUS: 3.28 CM
AORTIC VALVE AREA: 2.1 CM2
AORTIC VALVE MEAN VELOCITY: 1.39 M/S
AORTIC VALVE VELOCITY TIME INTEGRAL: 47.28 CM
ASCENDING AORTA: 3.15 CM
AV MEAN GRADIENT: 8.8 MMHG
AV PEAK GRADIENT: 15.63 MMHG
AV PEAK VELOCITY-S: 1.98 M/S
AV VALVE AREA: 2.1 CM2
BSA FOR ECHO PROCEDURE: 1.87 M2
CUSP SEPARATION: 1.46 CM
DOP CALC LVOT STROKE VOLUME: 91.58 ML
DOP CALC RVOT VTI: 17.67 CM
E WAVE DECELERATION TIME: 283.89 MS
E/A RATIO: 0.73
E/E' RATIO: 11.97
EDV (BP): 63.67 ML
EF (A4C): 60.48 %
EF A2C: 67.88 %
EJECTION FRACTION: 55 %
ESV (BP): 21.94 ML
INTERVENTRICULAR SEPTUM: 1.01 CM
LA ESV (BP): 28.54 ML
LA ESV INDEX (A2C): 12.92 ML/M2
LA ESV INDEX (BP): 15.26 ML/M2
LA/AORTA RATIO: 1.03
LAD 2D - M-MODE: 3.06 CM
LAV-S: 24.16 ML
LEFT ATRIUM VOLUME INDEX: 15.17 ML/M2
LEFT ATRIUM VOLUME: 28.37 ML
LEFT INTERNAL DIMENSION IN SYSTOLE: 2.55 CM (ref 2.53–3.83)
LEFT VENTRICLE DIASTOLIC VOLUME INDEX: 30.98 ML/M2
LEFT VENTRICLE DIASTOLIC VOLUME: 57.93 ML
LEFT VENTRICLE SYSTOLIC VOLUME INDEX: 12.24 ML/M2
LEFT VENTRICLE SYSTOLIC VOLUME: 22.89 ML
LEFT VENTRICULAR INTERNAL DIMENSION IN DIASTOLE: 4.03 CM (ref 4.27–5.93)
LV DIASTOLIC VOLUME: 65 ML
LV ESV (APICAL 2 CHAMBER): 25 ML
LVEDVI(A2C): 34.76 ML/M2
LVEDVI(BP): 34.05 ML/M2
LVESVI(A2C): 13.37 ML/M2
LVESVI(BP): 11.73 ML/M2
LVOT 2D: 2.26 CM
LVOT MG: 2.21 MMHG
LVOT MV: 0.68 M/S
LVOT PEAK VELOCITY: 1.03 M/S
LVOT PG: 4.28 MMHG
LVOT VTI: 22.84 CM
MV E'TISSUE VEL-LAT: 0.13 M/S
MV E'TISSUE VEL-MED: 0.08 M/S
MV PEAK A VEL: 1.25 M/S
MV PEAK E VEL: 0.91 M/S
MV STENOSIS PRESSURE HALF TIME: 82.33 MS
MV VALVE AREA P 1/2 METHOD: 2.67 CM2
POSTERIOR WALL: 1 CM
RVOT VMAX: 0.83 M/S
Z-SCORE OF LEFT VENTRICULAR DIMENSION IN END DIASTOLE: -2.22
Z-SCORE OF LEFT VENTRICULAR DIMENSION IN END SYSTOLE: -1.57

## 2018-08-02 PROCEDURE — 99215 OFFICE O/P EST HI 40 MIN: CPT | Performed by: INTERNAL MEDICINE

## 2018-08-02 PROCEDURE — 93306 TTE W/DOPPLER COMPLETE: CPT | Performed by: INTERNAL MEDICINE

## 2018-08-02 RX ORDER — METOPROLOL SUCCINATE 25 MG/1
25 TABLET, EXTENDED RELEASE ORAL DAILY
COMMUNITY
End: 2019-08-05 | Stop reason: SDUPTHER

## 2018-08-02 RX ORDER — INSULIN GLARGINE 100 [IU]/ML
INJECTION, SOLUTION SUBCUTANEOUS NIGHTLY
COMMUNITY
End: 2020-08-10

## 2018-08-02 RX ORDER — ASPIRIN 81 MG/1
81 TABLET ORAL DAILY
COMMUNITY

## 2018-08-02 RX ORDER — FERROUS SULFATE 325(65) MG
65 TABLET ORAL
COMMUNITY
End: 2020-08-10

## 2018-08-02 RX ORDER — INSULIN ASPART 100 [IU]/ML
INJECTION, SOLUTION INTRAVENOUS; SUBCUTANEOUS
COMMUNITY
End: 2018-11-06

## 2018-08-02 ASSESSMENT — ENCOUNTER SYMPTOMS
INSOMNIA: 0
MYALGIAS: 0
NERVOUS/ANXIOUS: 0
HEARTBURN: 0
MEMORY LOSS: 0
TREMORS: 0
CHANGE IN BOWEL HABIT: 0
PALPITATIONS: 0
HEMATOLOGIC/LYMPHATIC NEGATIVE: 1
DIARRHEA: 0
ANOREXIA: 0
WHEEZING: 0
ABDOMINAL PAIN: 0
SHORTNESS OF BREATH: 0
DIZZINESS: 0
WEAKNESS: 1
BRUISES/BLEEDS EASILY: 0
DYSPNEA ON EXERTION: 0
JAUNDICE: 0
NUMBNESS: 0
FREQUENCY: 0
FOCAL WEAKNESS: 0
HOARSE VOICE: 0
HEADACHES: 0
SYNCOPE: 0
IRREGULAR HEARTBEAT: 0
MUSCLE CRAMPS: 0
SNORING: 0
WEIGHT LOSS: 0
SPUTUM PRODUCTION: 0
SLEEP DISTURBANCES DUE TO BREATHING: 0
CONSTIPATION: 0
COUGH: 0
PND: 0
FALLS: 0
CLAUDICATION: 0
NAUSEA: 0
ORTHOPNEA: 0
HEMOPTYSIS: 0
LIGHT-HEADEDNESS: 0
WEIGHT GAIN: 0
VERTIGO: 0
NEAR-SYNCOPE: 0

## 2018-08-02 NOTE — ASSESSMENT & PLAN NOTE
Beginning of May he had acute inferior infarction catheterization revealed multivessel disease he underwent bypass surgery left internal mammary LAD, vein to diagonal, vein OM1, vein OM 2.  He had perioperative atrial fibrillation remains on amiodarone at present clinically sinus rhythm no further angina  Stable ejection fraction low normal 50% mildly as mild MR inferoapical hypokinesis

## 2018-08-02 NOTE — TELEPHONE ENCOUNTER
Pt's daughter called to have lab slip for Cardiac Rehab Orderables mailed to pt's home address on file.     thank you.   Order mailed to patient home as requested.ty

## 2018-08-02 NOTE — ASSESSMENT & PLAN NOTE
Going to have creatinine GI workup with Dr. French White at [facility] from a cardiac standpoint he is stable to have this done with fresh myocardial infarction and recent bypasses would recommend not interrupting aspirin therapy if possible prior to GI workup

## 2018-08-02 NOTE — LETTER
August 2, 2018     French MEI DO Radha  [address] [facility] [place] [address]    Patient: Maximus Daniel   YOB: 1940   Date of Visit: 8/2/2018       Dear Luis    Thank you for referring Maximus Daniel to me for evaluation. Below are my notes for this consultation.    If you have questions, please do not hesitate to call me. I look forward to following your patient along with you.         Sincerely,        Fernie Rodriguez MD        CC: MD Fernie Zuniga MD  8/2/2018  2:37 PM  Sign at close encounter      Cardiology Note    French RING DO Radha          Maximus Daniel is a 78 y.o. male 1940      He is here to establish cardiac care    In May 2018 [facility] had [facility] inferior [facility]    [facility] and had bypass surgery with left internal mammary LAD vein diagonal vein to OM1 vein to OM 2 this is complicated by emma-op atrial fibrillation  Echocardiogram today for wall hypokinetic repeat her carotid 50% mild MR mild aortic stenosis  He is still at [facility] for rehab and care  He has mild aortic stenosis with mean gradient of 10 and ejection fraction 50% by echo in May he is treated for hypothyroidism and has a history of hernia with intermittent obstruction  He is also treated for diabetes hyperlipidemia and hypothyroidism    Prior to discharge he had his inguinal  hernia was evaluated by Dr. Wells felt it was a chronic issue, incarerated fat  and no urgent intervention was needed at this time    A new  Problem is  iron def anemia will be having  GI work up withDr French White    Patient Active Problem List    Diagnosis Date Noted   • Iron deficiency anemia 08/02/2018   • PAF (paroxysmal atrial fibrillation) (CMS/[facility]) ([facility]) 08/02/2018   • History of coronary artery bypass graft 06/11/2018   • Nonrheumatic aortic valve stenosis 06/11/2018   • Acute ST elevation myocardial infarction (STEMI) (CMS/[facility]) ([facility]) 05/19/2018   • Type 2 diabetes  mellitus (CMS/[facility]) ([facility]) 05/18/2018   • Incarcerated hernia 05/18/2018   • Hypothyroidism 05/18/2018   • Hyperlipidemia 05/18/2018   • Nephrolithiasis 05/18/2018     Problem List     Type 2 diabetes mellitus (CMS/[facility]) ([facility])    Overview Addendum 5/19/2018  5:58 AM by Patricia Lucero MD     DM 2 on invokana, Lantus and metformin therapies at home         Incarcerated hernia    Hypothyroidism    Hyperlipidemia    Nephrolithiasis    Overview Signed 5/18/2018 11:37 PM by SLY Bernstein     History of kidney stone without requiring surgical removal         Acute ST elevation myocardial infarction (STEMI) (CMS/[facility]) ([facility])    Overview Signed 6/11/2018  1:13 PM by Fernie Rodriguez MD     May 2018 acute inf mi om2 culprit lesion  cabg LIMA lad VEIN diat VEIn om1 vein om2  periop afib         History of coronary artery bypass graft    Overview Addendum 6/11/2018  1:09 PM by Fernie Rodriguez MD     may2018 after nonstemi   LIMA lad, SVG to diag, SVG om1, SVG om2  periop afib          Nonrheumatic aortic valve stenosis    Overview Signed 6/11/2018  1:06 PM by Fernie Rodriguez MD     Echo 5/18 ef 50% mild as mean grad 10               Allergy    Patient has no known allergies.          MED LIST       Current Outpatient Prescriptions   Medication Sig Dispense Refill   • amiodarone (PACERONE) 200 mg tablet Take 1 tablet (200 mg total) by mouth daily. 30 tablet 2   • aspirin 81 mg enteric coated tablet Take 81 mg by mouth daily.     • atorvastatin (LIPITOR) 80 mg tablet Take 80 mg by mouth daily.     • ferrous sulfate 325 mg (65 mg iron) tablet Take 65 mg by mouth daily with breakfast.     • insulin aspart U-100 (NovoLOG) 100 unit/mL injection Inject under the skin 3 (three) times a day before meals.     • insulin glargine (LANTUS SOLOSTAR) 100 unit/mL (3 mL) subcutaneous pen Inject under the skin nightly.     • levothyroxine (SYNTHROID) 175 mcg tablet Take 175 mcg by mouth once daily.    3   • metoprolol succinate XL  (TOPROL-XL) 25 mg 24 hr tablet Take 25 mg by mouth daily.       No current facility-administered medications for this visit.                 Review of Systems   Constitution: Positive for weakness. Negative for malaise/fatigue, weight gain and weight loss.   HENT: Negative for hearing loss and hoarse voice.    Eyes: Negative for visual disturbance.   Cardiovascular: Negative for chest pain, claudication, cyanosis, dyspnea on exertion, irregular heartbeat, leg swelling, near-syncope, orthopnea, palpitations, paroxysmal nocturnal dyspnea and syncope.   Respiratory: Negative for cough, hemoptysis, shortness of breath, sleep disturbances due to breathing, snoring, sputum production and wheezing.    Endocrine: Negative for cold intolerance and heat intolerance.   Hematologic/Lymphatic: Negative.  Negative for bleeding problem. Does not bruise/bleed easily.   Skin: Negative.  Negative for rash.   Musculoskeletal: Negative for arthritis, falls, joint pain, muscle cramps and myalgias.   Gastrointestinal: Negative for abdominal pain, anorexia, change in bowel habit, constipation, diarrhea, dysphagia, heartburn, jaundice and nausea.   Genitourinary: Negative for frequency and nocturia.   Neurological: Negative for dizziness, focal weakness, headaches, light-headedness, numbness, tremors and vertigo.   Psychiatric/Behavioral: Negative for memory loss. The patient does not have insomnia and is not nervous/anxious.    Allergic/Immunologic: Negative for hives.       Labs   Lab Results   Component Value Date    WBC 7.81 05/26/2018    HGB 9.8 (L) 05/26/2018    HCT 31.3 (L) 05/26/2018     (H) 05/26/2018    CHOL 152 05/19/2018    TRIG 101 05/19/2018    HDL 29 (L) 05/19/2018    LDLCALC 103 (H) 05/19/2018    ALT 53 05/18/2018     (H) 05/18/2018     05/26/2018    K 3.8 05/26/2018    CL 99 05/26/2018    CREATININE 1.3 05/26/2018    BUN 18 05/26/2018    CO2 30 05/26/2018    INR 1.6 05/20/2018    HGBA1C 10.5 (H)  "05/19/2018       Lab Results   Component Value Date    GLUCOSE 94 05/26/2018    CALCIUM 8.2 (L) 05/26/2018     05/26/2018    K 3.8 05/26/2018    CO2 30 05/26/2018    CL 99 05/26/2018    BUN 18 05/26/2018    CREATININE 1.3 05/26/2018         Objective   Vitals:    08/02/18 1317   BP: 102/60   BP Location: Left upper arm   Patient Position: Sitting   Pulse: 61   SpO2: 97%   Weight: 78.5 kg (173 lb)   Height: 1.6 m (5' 3\")     Physical Exam   Constitutional: He is oriented to person, place, and time. He appears well-developed and well-nourished. He is active.  Non-toxic appearance. He does not have a sickly appearance. He does not appear ill. No distress. He is not intubated.   HENT:   Head: Normocephalic. Not microcephalic. Head is without raccoon's eyes, without abrasion and without contusion.   Nose: Nose normal.   Eyes: EOM are normal. Left eye exhibits no discharge and no exudate. Right conjunctiva is not injected. Left conjunctiva is not injected. Left conjunctiva has no hemorrhage. No scleral icterus. Pupils are equal.   Neck: Normal range of motion. Neck supple. Normal carotid pulses and no hepatojugular reflux present. Carotid bruit is not present.   Cardiovascular: Normal rate, regular rhythm, normal heart sounds, intact distal pulses and normal pulses.  PMI is not displaced.  Exam reveals no gallop and no friction rub.    No murmur heard.  1/6 systolic murmur   Pulmonary/Chest: Effort normal and breath sounds normal. No stridor. No apnea, no tachypnea and no bradypnea. He is not intubated. No respiratory distress. He has no wheezes. He has no rales. He exhibits no tenderness.   Abdominal: Soft. Normal appearance, normal aorta and bowel sounds are normal. He exhibits no distension. There is no tenderness.   Musculoskeletal: Normal range of motion. He exhibits no edema or deformity.   Walks with walker   Neurological: He is alert and oriented to person, place, and time.   Skin: No abrasion, no bruising, " no ecchymosis and no rash noted. He is not diaphoretic. No erythema. No pallor.   Psychiatric: He has a normal mood and affect. His mood appears not anxious. His affect is not angry, not blunt, not labile and not inappropriate. His speech is not slurred. He is not agitated, not aggressive, not withdrawn and not combative. He does not exhibit a depressed mood. He is communicative.         Cardiac Procedures      CATH  5/18 90% PROX LAD MID 70%, Om1 80%, INF BRANCH 95% RCA NON OBSTRUCTIVE DISEASE      CV SURGERY     CABG x 4 5/18 AFTER NONSTEM limA LAD v TO DIAG, v Om1, v Om1   PERIO AFIB         ELECTROPHYSIOLOGY      paf perio op cabg 5/18    ECHO    5/18 ef 50% mild as mean grad 10  7/18 ef 50% inf apical akinetic ef 50% mild mr mild as  EKG  nsr on spec st seg flattening    Assessment/Plan:      Acute ST elevation myocardial infarction (STEMI) (CMS/[facility]) ([facility])  Beginning of May he had acute inferior infarction catheterization revealed multivessel disease he underwent bypass surgery left internal mammary LAD, vein to diagonal, vein OM1, vein OM 2.  He had perioperative atrial fibrillation remains on amiodarone at present clinically sinus rhythm no further angina  Stable ejection fraction low normal 50% mildly as mild MR inferoapical hypokinesis    Nonrheumatic aortic valve stenosis  Mild aortic stenosis mean gradient 10    Incarcerated hernia  Not causing pain at the moment felt to be chronically incarcerated with past that is felt to be chronically incarcerated was evaluated by Dr. Wells no need for urgent surgery    Type 2 diabetes mellitus (CMS/[facility]) ([facility])  On insulin followed by you    Iron deficiency anemia  Going to have creatinine GI workup with Dr. French White at [facility] from a cardiac standpoint he is stable to have this done with fresh myocardial infarction and recent bypasses would recommend not interrupting aspirin therapy if possible prior to GI workup    PAF (paroxysmal atrial fibrillation) (CMS/[facility])  (HCC)  Had emma-op atrial fibrillation presently sinus rhythm continue amiodarone for now may stop in a few months         From a cardiac stable to undergo endoscopy studies for his ironr deficiency anemia  If possible would recommend not interrupting aspirin therapy with his history of myocardial infarction in May and recent bypass surgery  I will see her back in 3 months with full lab work prior to  He will begin cardiac outpatient rehab probably at [facility]  At his next Visit we will probably stop amiodarone at that time    Thank you for allowing me to participate in the care of this patient.  I hope this information is helpful.        This letter was generated using speech recognition software.  Please excuse any typographical errors.  Fernie Rodriguez MD [facility]   8/2/2018  Cc French Garcia, DO

## 2018-08-02 NOTE — ASSESSMENT & PLAN NOTE
Had emma-op atrial fibrillation presently sinus rhythm continue amiodarone for now may stop in a few months

## 2018-08-02 NOTE — ASSESSMENT & PLAN NOTE
Not causing pain at the moment felt to be chronically incarcerated with past that is felt to be chronically incarcerated was evaluated by Dr. Wells no need for urgent surgery

## 2018-08-07 ENCOUNTER — TELEPHONE (OUTPATIENT)
Dept: SCHEDULING | Facility: CLINIC | Age: 78
End: 2018-08-07

## 2018-08-07 NOTE — TELEPHONE ENCOUNTER
Gi catherine from [facility], requesting a CCV Letter and ekg for pt will have a colonoscopy on 8/10/18 by Dr. French White.

## 2018-08-07 NOTE — LETTER
August 7, 2018        Dear Dr. French White,     Maximus Daniel (1940) is cleared for scheduled coloscopy on 8/10/2018. There are no cardiac contraindications for colonoscopy.Please refer to my last visit note and do not hesitate to contact our office with further needs. Aspirin therapy should not be interrupted unless deemed medically necessary.      Sincerely,           Fernie Rodriguez

## 2018-08-10 ENCOUNTER — TELEPHONE (OUTPATIENT)
Dept: CARDIOLOGY | Facility: CLINIC | Age: 78
End: 2018-08-10

## 2018-08-10 NOTE — TELEPHONE ENCOUNTER
Cardiac clearance needed...   Dr. Jason Fong  Sandra date - 9/7   Surgery - laproscopic partial right colectomy   (F) [phone]

## 2018-08-13 ENCOUNTER — TELEPHONE (OUTPATIENT)
Dept: CARDIOLOGY | Facility: CLINIC | Age: 78
End: 2018-08-13

## 2018-08-13 NOTE — TELEPHONE ENCOUNTER
Rec'd request for Cardiac clearance letter for 9/7 lap colectomy. Dr. Rodriguez is requesting last GI Letter from Dr. Fong. Called the surgical office and it is closed for the day. Will fax letter request to [phone].

## 2018-08-30 ENCOUNTER — TELEPHONE (OUTPATIENT)
Dept: SCHEDULING | Facility: CLINIC | Age: 78
End: 2018-08-30

## 2018-08-30 NOTE — TELEPHONE ENCOUNTER
[facility] anesthesiologist is calling to obtain most recent EKG  Please Fax [phone]

## 2018-09-21 ENCOUNTER — TELEPHONE (OUTPATIENT)
Dept: SCHEDULING | Facility: CLINIC | Age: 78
End: 2018-09-21

## 2018-09-21 NOTE — TELEPHONE ENCOUNTER
Spoke to Pt Daughter Gina regarding a request for records from her Brother's PCP(Maximus MEI Marcosmaximo 60).     He was upset that this was not authorized and declines the record request. Per Gina's report.     She offered that her father Maximus Daniel was needing surgical clearance, and thinks that perhaps the records were mixed up. She would like the middle initials to be entered in the charts so that there is no chance of a mix up in the future. I will communicate this information to the appropriate source.     I still had the faxed form for her father's clearance from the 18 . I verified it was only faxed to Dr. Fong, the appropriate Surgeon. Only the correct patient and  were on the transmission. Fax request scanned into chart.   She states no further action is needed at this time.

## 2018-09-21 NOTE — TELEPHONE ENCOUNTER
Pt's daughter, Gina, would like to speak to someone re: records being released in the wrong chart. She states office sent request to her brother's PCP instead of pt's pcp. Pt's son has the same name as pt.

## 2018-10-30 NOTE — PROGRESS NOTES
Cardiology Note    Travis Drummond MD          Maximus Daniel is a 78 y.o. male 1940     Since I last saw him colon resection sept for cancer [facility] by Dr. Aguirre I will have to obtain his records  He told the lymph nodes were negative he needs no chemotherapy or radiation  He is presently at [facility] recovering and feeling well  He returns for cardiac follow-up and review his lab work.  In May 2018 had acute inferior infarction cardiac cath revealed multivessel disease underwent bypass surgery  Left internal mammary LAD, vein diagonal, vein OM1, vein OM 2  He had emma-op A. fib remains on amiodarone  Ejection fraction at that time low normal 50%  Is mild aortic stenosis with a mean gradient of 10  He has an incarcerated hernia was evaluated by Dr. Wells without urgent surgery was not indicated he was having no symptoms  He has type 2 diabetes  I  His lab work is stable cholesterol at goal BNP mildly elevated to 40 he has an EF of 50% no clinical signs of heart failure, I did not add diuretic therapy           Patient Active Problem List    Diagnosis Date Noted   • Malignant neoplasm of sigmoid colon (CMS/[facility]) 11/06/2018   • Iron deficiency anemia 08/02/2018   • PAF (paroxysmal atrial fibrillation) (CMS/[facility]) ([facility]) 08/02/2018   • History of coronary artery bypass graft 06/11/2018   • Nonrheumatic aortic valve stenosis 06/11/2018   • Acute ST elevation myocardial infarction (STEMI) (CMS/[facility]) ([facility]) 05/19/2018   • Type 2 diabetes mellitus (CMS/[facility]) ([facility]) 05/18/2018   • Incarcerated hernia 05/18/2018   • Hypothyroidism 05/18/2018   • Hyperlipidemia 05/18/2018   • Nephrolithiasis 05/18/2018       Allergy    Patient has no known allergies.          MED LIST       Current Outpatient Prescriptions   Medication Sig Dispense Refill   • aspirin 81 mg enteric coated tablet Take 81 mg by mouth daily.     • atorvastatin (LIPITOR) 80 mg tablet Take 80 mg by mouth daily.     • ferrous sulfate 325 mg (65 mg iron)  tablet Take 65 mg by mouth daily with breakfast.     • insulin lispro (HumaLOG U-100 Insulin) 100 unit/mL vial Inject under the skin once daily.     • metoprolol succinate XL (TOPROL-XL) 25 mg 24 hr tablet Take 25 mg by mouth daily.     • insulin glargine (LANTUS SOLOSTAR) 100 unit/mL (3 mL) subcutaneous pen Inject under the skin nightly.       No current facility-administered medications for this visit.                 Review of Systems   Constitution: Negative for malaise/fatigue, weight gain and weight loss.   HENT: Negative for hearing loss and hoarse voice.    Eyes: Negative for visual disturbance.   Cardiovascular: Negative for chest pain, claudication, cyanosis, dyspnea on exertion, irregular heartbeat, leg swelling, near-syncope, orthopnea, palpitations, paroxysmal nocturnal dyspnea and syncope.   Respiratory: Negative for cough, hemoptysis, shortness of breath, sleep disturbances due to breathing, snoring, sputum production and wheezing.    Endocrine: Negative for cold intolerance and heat intolerance.   Hematologic/Lymphatic: Negative.  Negative for bleeding problem. Does not bruise/bleed easily.   Skin: Negative.  Negative for rash.   Musculoskeletal: Positive for joint pain. Negative for arthritis, falls, muscle cramps and myalgias.   Gastrointestinal: Negative for abdominal pain, anorexia, change in bowel habit, constipation, diarrhea, dysphagia, heartburn, jaundice and nausea.   Genitourinary: Negative for frequency and nocturia.   Neurological: Negative for dizziness, focal weakness, headaches, light-headedness, numbness, tremors and vertigo.   Psychiatric/Behavioral: Negative for memory loss. The patient does not have insomnia and is not nervous/anxious.    Allergic/Immunologic: Negative for hives.       Labs   Lab Results   Component Value Date    WBC 7.81 05/26/2018    HGB 9.8 (L) 05/26/2018    HCT 31.3 (L) 05/26/2018     (H) 05/26/2018    CHOL 152 05/19/2018    TRIG 101 05/19/2018    HDL 29  "(L) 05/19/2018    LDLCALC 103 (H) 05/19/2018    ALT 53 05/18/2018     (H) 05/18/2018     05/26/2018    K 3.8 05/26/2018    CL 99 05/26/2018    CREATININE 1.3 05/26/2018    BUN 18 05/26/2018    CO2 30 05/26/2018    INR 1.6 05/20/2018    HGBA1C 10.5 (H) 05/19/2018       Lab Results   Component Value Date    GLUCOSE 94 05/26/2018    CALCIUM 8.2 (L) 05/26/2018     05/26/2018    K 3.8 05/26/2018    CO2 30 05/26/2018    CL 99 05/26/2018    BUN 18 05/26/2018    CREATININE 1.3 05/26/2018         Objective      bnp 240        Vitals:    11/06/18 1324   BP: (!) 100/54   Pulse: (!) 55   SpO2: 94%   Weight: 86.2 kg (190 lb)   Height: 1.6 m (5' 3\")     Physical Exam   Constitutional: He is oriented to person, place, and time. He appears well-developed and well-nourished. He is active.  Non-toxic appearance. He does not have a sickly appearance. He does not appear ill. No distress. He is not intubated.   HENT:   Head: Normocephalic. Not microcephalic. Head is without raccoon's eyes, without abrasion and without contusion.   Nose: Nose normal.   Eyes: EOM are normal. Left eye exhibits no discharge and no exudate. Right conjunctiva is not injected. Left conjunctiva is not injected. Left conjunctiva has no hemorrhage. No scleral icterus. Pupils are equal.   Neck: Normal range of motion. Neck supple. Normal carotid pulses and no hepatojugular reflux present. Carotid bruit is not present.   Cardiovascular: Normal rate, regular rhythm, normal heart sounds, intact distal pulses and normal pulses.  PMI is not displaced.  Exam reveals no gallop and no friction rub.    No murmur heard.  1/6 systolic murmur   Pulmonary/Chest: Effort normal and breath sounds normal. No stridor. No apnea, no tachypnea and no bradypnea. He is not intubated. No respiratory distress. He has no wheezes. He has no rales. He exhibits no tenderness.   Abdominal: Soft. Normal appearance, normal aorta and bowel sounds are normal. He exhibits no " distension. There is no tenderness.   Musculoskeletal: Normal range of motion. He exhibits no edema or deformity.   Neurological: He is alert and oriented to person, place, and time.   Skin: No abrasion, no bruising, no ecchymosis and no rash noted. He is not diaphoretic. No erythema. No pallor.   Psychiatric: He has a normal mood and affect. His mood appears not anxious. His affect is not angry, not blunt, not labile and not inappropriate. His speech is not slurred. He is not agitated, not aggressive, not withdrawn and not combative. He does not exhibit a depressed mood. He is communicative.         Cardiac Procedures  Cardiac Procedures        CATH  5/18 90% PROX LAD MID 70%, Om1 80%, INF BRANCH 95% RCA NON OBSTRUCTIVE DISEASE        CV SURGERY     CABG x 4 5/18 AFTER NONSTEM limA LAD v TO DIAG, v Om1, v Om1   PERIO AFIB          ELECTROPHYSIOLOGY       paf perio op cabg 5/18     ECHO       7/18 ef 50% inf apical akinetic ef 50% mild mr mild as        EKG  nsr on spec st seg flattening    EKGsinus alysia 54 non spec st flattening ant lat t wave inversions no change      Assessment/Plan:      History of coronary artery bypass graft  May 2018 nonstemi  Followed by bypass surgery  Left internal mammary LAD, vein diagonal, vein OM1, vein OM 2  Echocardiogram July 2018 ejection fraction 50% mild aortic stenosis with mean gradient of 10      PAF (paroxysmal atrial fibrillation) (CMS/HCC) ([facility])  This occurred perioperatively in May has maintained sinus rhythm, I stopped amiodarone today continue beta-blocker    Type 2 diabetes mellitus (CMS/HCC) ([facility])  On insulin and oral therapies    Hyperlipidemia  On high-dose atorvastatin LDL cholesterol 60 I made no changes more is concerned about intolerance with Lipitor 80 but he is doing well    Nonrheumatic aortic valve stenosis  Echo in July inferoapical akinesis ejection fraction 50% mean gradient 10 4 echo with next visit, mild    Malignant neoplasm of sigmoid colon  (CMS/HCC)  Workup for anemia picked up colon cancer successfully resected and [facility] by Dr. Aguirre September 2018         He is in [facility] after recovering from colon resection for cancer in [facility] he looks terrific  From a cardiac standpoint and stopping amiodarone he had perioperative atrial fibrillation in May no recurrence  Follow-up in 3 months with lab work and resting echocardiogram    Thank you for allowing me to participate in the care of this patient.  I hope this information is helpful.    Fernie Rodriguez MD [facility]   11/6/2018  Travis Griffith MD   Abdomen soft, non-tender, no guarding.

## 2018-11-01 ENCOUNTER — TELEPHONE (OUTPATIENT)
Dept: CARDIOLOGY | Facility: CLINIC | Age: 78
End: 2018-11-01

## 2018-11-01 NOTE — TELEPHONE ENCOUNTER
I called pt LMOM for him to call me back.    I want to confirm OV 11/6/18.    Also, Did pt have labs, where?

## 2018-11-06 ENCOUNTER — OFFICE VISIT (OUTPATIENT)
Dept: CARDIOLOGY | Facility: CLINIC | Age: 78
End: 2018-11-06
Payer: MEDICARE

## 2018-11-06 VITALS
HEART RATE: 55 BPM | SYSTOLIC BLOOD PRESSURE: 100 MMHG | DIASTOLIC BLOOD PRESSURE: 54 MMHG | HEIGHT: 63 IN | BODY MASS INDEX: 33.66 KG/M2 | WEIGHT: 190 LBS | OXYGEN SATURATION: 94 %

## 2018-11-06 DIAGNOSIS — I48.0 PAF (PAROXYSMAL ATRIAL FIBRILLATION) (CMS/HCC): ICD-10-CM

## 2018-11-06 DIAGNOSIS — E78.5 HYPERLIPIDEMIA, UNSPECIFIED HYPERLIPIDEMIA TYPE: ICD-10-CM

## 2018-11-06 DIAGNOSIS — I35.0 NONRHEUMATIC AORTIC VALVE STENOSIS: ICD-10-CM

## 2018-11-06 DIAGNOSIS — E03.9 HYPOTHYROIDISM, UNSPECIFIED TYPE: ICD-10-CM

## 2018-11-06 DIAGNOSIS — I21.21 ACUTE ST ELEVATION MYOCARDIAL INFARCTION (STEMI) INVOLVING LEFT CIRCUMFLEX CORONARY ARTERY (CMS/HCC): Primary | ICD-10-CM

## 2018-11-06 DIAGNOSIS — E11.8 TYPE 2 DIABETES MELLITUS WITH COMPLICATION, WITHOUT LONG-TERM CURRENT USE OF INSULIN (CMS/HCC): ICD-10-CM

## 2018-11-06 PROBLEM — C18.7 MALIGNANT NEOPLASM OF SIGMOID COLON (CMS/HCC): Status: ACTIVE | Noted: 2018-11-06

## 2018-11-06 PROCEDURE — 99214 OFFICE O/P EST MOD 30 MIN: CPT | Performed by: INTERNAL MEDICINE

## 2018-11-06 PROCEDURE — 93000 ELECTROCARDIOGRAM COMPLETE: CPT | Performed by: INTERNAL MEDICINE

## 2018-11-06 RX ORDER — LEVOTHYROXINE SODIUM 175 UG/1
175 TABLET ORAL
Qty: 90 TABLET | Refills: 3
Start: 2018-11-06 | End: 2020-02-03

## 2018-11-06 RX ORDER — INSULIN LISPRO 100 [IU]/ML
INJECTION, SOLUTION INTRAVENOUS; SUBCUTANEOUS DAILY
COMMUNITY
End: 2020-08-10

## 2018-11-06 ASSESSMENT — ENCOUNTER SYMPTOMS
MYALGIAS: 0
HEMOPTYSIS: 0
NEAR-SYNCOPE: 0
NERVOUS/ANXIOUS: 0
DIZZINESS: 0
FOCAL WEAKNESS: 0
INSOMNIA: 0
WHEEZING: 0
HEADACHES: 0
WEIGHT LOSS: 0
JAUNDICE: 0
NAUSEA: 0
MEMORY LOSS: 0
TREMORS: 0
PALPITATIONS: 0
COUGH: 0
FREQUENCY: 0
PND: 0
WEIGHT GAIN: 0
VERTIGO: 0
IRREGULAR HEARTBEAT: 0
MUSCLE CRAMPS: 0
HEARTBURN: 0
CONSTIPATION: 0
BRUISES/BLEEDS EASILY: 0
ABDOMINAL PAIN: 0
LIGHT-HEADEDNESS: 0
SHORTNESS OF BREATH: 0
DYSPNEA ON EXERTION: 0
CLAUDICATION: 0
FALLS: 0
DIARRHEA: 0
HOARSE VOICE: 0
SPUTUM PRODUCTION: 0
HEMATOLOGIC/LYMPHATIC NEGATIVE: 1
SNORING: 0
ANOREXIA: 0
NUMBNESS: 0
ORTHOPNEA: 0
CHANGE IN BOWEL HABIT: 0
SYNCOPE: 0
SLEEP DISTURBANCES DUE TO BREATHING: 0

## 2018-11-06 NOTE — ASSESSMENT & PLAN NOTE
On high-dose atorvastatin LDL cholesterol 60 I made no changes more is concerned about intolerance with Lipitor 80 but he is doing well

## 2018-11-06 NOTE — ASSESSMENT & PLAN NOTE
This occurred perioperatively in May has maintained sinus rhythm, I stopped amiodarone today continue beta-blocker

## 2018-11-06 NOTE — ASSESSMENT & PLAN NOTE
Workup for anemia picked up colon cancer successfully resected and [facility] by Dr. Aguirre September 2018

## 2018-11-06 NOTE — LETTER
November 6, 2018     Travis Drummond MD  [address]  [address]  [place] [id number]    Patient: Maximus Daniel   YOB: 1940   Date of Visit: 11/6/2018       Dear Dr. Drummond:    Thank you for referring Maximus Daniel to me for evaluation. Below are my notes for this consultation.    If you have questions, please do not hesitate to call me. I look forward to following your patient along with you.         Sincerely,        Fernie Rodriguez MD        CC: No Recipients  Fernie Rodriguez MD  11/6/2018  2:54 PM  Sign at close encounter      Cardiology Note    Travis Drummond MD          Maximus Daniel is a 78 y.o. male 1940     Since I last saw him colon resection sept for cancer and [facility] by Dr. Aguirre I will have to obtain his records  He told the lymph nodes were negative he needs no chemotherapy or radiation  He is presently at [facility] and feeling well  He returns for cardiac follow-up and review his lab work.  In May 2018 had acute inferior infarction cardiac cath revealed multivessel disease underwent bypass surgery  Left internal mammary LAD, vein diagonal, vein OM1, vein OM 2  He had emma-op A. fib remains on amiodarone  Ejection fraction at that time low normal 50%  Is mild aortic stenosis with a mean gradient of 10  He has an incarcerated hernia was evaluated by Dr. Wells without urgent surgery was not indicated he was having no symptoms  He has type 2 diabetes  I  His lab work is stable cholesterol at goal BNP mildly elevated to 40 he has an EF of 50% no clinical signs of heart failure, I did not add diuretic therapy           Patient Active Problem List    Diagnosis Date Noted   • Malignant neoplasm of sigmoid colon (CMS/HCC) 11/06/2018   • Iron deficiency anemia 08/02/2018   • PAF (paroxysmal atrial fibrillation) (CMS/HCC) ([facility]) 08/02/2018   • History of coronary artery bypass graft 06/11/2018   • Nonrheumatic aortic valve stenosis 06/11/2018   •  Acute ST elevation myocardial infarction (STEMI) (CMS/[facility]) ([facility]) 05/19/2018   • Type 2 diabetes mellitus (CMS/[facility]) ([facility]) 05/18/2018   • Incarcerated hernia 05/18/2018   • Hypothyroidism 05/18/2018   • Hyperlipidemia 05/18/2018   • Nephrolithiasis 05/18/2018       Allergy    Patient has no known allergies.          MED LIST       Current Outpatient Prescriptions   Medication Sig Dispense Refill   • aspirin 81 mg enteric coated tablet Take 81 mg by mouth daily.     • atorvastatin (LIPITOR) 80 mg tablet Take 80 mg by mouth daily.     • ferrous sulfate 325 mg (65 mg iron) tablet Take 65 mg by mouth daily with breakfast.     • insulin lispro (HumaLOG U-100 Insulin) 100 unit/mL vial Inject under the skin once daily.     • metoprolol succinate XL (TOPROL-XL) 25 mg 24 hr tablet Take 25 mg by mouth daily.     • insulin glargine (LANTUS SOLOSTAR) 100 unit/mL (3 mL) subcutaneous pen Inject under the skin nightly.       No current facility-administered medications for this visit.                 Review of Systems   Constitution: Negative for malaise/fatigue, weight gain and weight loss.   HENT: Negative for hearing loss and hoarse voice.    Eyes: Negative for visual disturbance.   Cardiovascular: Negative for chest pain, claudication, cyanosis, dyspnea on exertion, irregular heartbeat, leg swelling, near-syncope, orthopnea, palpitations, paroxysmal nocturnal dyspnea and syncope.   Respiratory: Negative for cough, hemoptysis, shortness of breath, sleep disturbances due to breathing, snoring, sputum production and wheezing.    Endocrine: Negative for cold intolerance and heat intolerance.   Hematologic/Lymphatic: Negative.  Negative for bleeding problem. Does not bruise/bleed easily.   Skin: Negative.  Negative for rash.   Musculoskeletal: Positive for joint pain. Negative for arthritis, falls, muscle cramps and myalgias.   Gastrointestinal: Negative for abdominal pain, anorexia, change in bowel habit, constipation, diarrhea,  "dysphagia, heartburn, jaundice and nausea.   Genitourinary: Negative for frequency and nocturia.   Neurological: Negative for dizziness, focal weakness, headaches, light-headedness, numbness, tremors and vertigo.   Psychiatric/Behavioral: Negative for memory loss. The patient does not have insomnia and is not nervous/anxious.    Allergic/Immunologic: Negative for hives.       Labs   Lab Results   Component Value Date    WBC 7.81 05/26/2018    HGB 9.8 (L) 05/26/2018    HCT 31.3 (L) 05/26/2018     (H) 05/26/2018    CHOL 152 05/19/2018    TRIG 101 05/19/2018    HDL 29 (L) 05/19/2018    LDLCALC 103 (H) 05/19/2018    ALT 53 05/18/2018     (H) 05/18/2018     05/26/2018    K 3.8 05/26/2018    CL 99 05/26/2018    CREATININE 1.3 05/26/2018    BUN 18 05/26/2018    CO2 30 05/26/2018    INR 1.6 05/20/2018    HGBA1C 10.5 (H) 05/19/2018       Lab Results   Component Value Date    GLUCOSE 94 05/26/2018    CALCIUM 8.2 (L) 05/26/2018     05/26/2018    K 3.8 05/26/2018    CO2 30 05/26/2018    CL 99 05/26/2018    BUN 18 05/26/2018    CREATININE 1.3 05/26/2018         Objective      bnp 240        Vitals:    11/06/18 1324   BP: (!) 100/54   Pulse: (!) 55   SpO2: 94%   Weight: 86.2 kg (190 lb)   Height: 1.6 m (5' 3\")     Physical Exam   Constitutional: He is oriented to person, place, and time. He appears well-developed and well-nourished. He is active.  Non-toxic appearance. He does not have a sickly appearance. He does not appear ill. No distress. He is not intubated.   HENT:   Head: Normocephalic. Not microcephalic. Head is without raccoon's eyes, without abrasion and without contusion.   Nose: Nose normal.   Eyes: EOM are normal. Left eye exhibits no discharge and no exudate. Right conjunctiva is not injected. Left conjunctiva is not injected. Left conjunctiva has no hemorrhage. No scleral icterus. Pupils are equal.   Neck: Normal range of motion. Neck supple. Normal carotid pulses and no hepatojugular " reflux present. Carotid bruit is not present.   Cardiovascular: Normal rate, regular rhythm, normal heart sounds, intact distal pulses and normal pulses.  PMI is not displaced.  Exam reveals no gallop and no friction rub.    No murmur heard.  1/6 systolic murmur   Pulmonary/Chest: Effort normal and breath sounds normal. No stridor. No apnea, no tachypnea and no bradypnea. He is not intubated. No respiratory distress. He has no wheezes. He has no rales. He exhibits no tenderness.   Abdominal: Soft. Normal appearance, normal aorta and bowel sounds are normal. He exhibits no distension. There is no tenderness.   Musculoskeletal: Normal range of motion. He exhibits no edema or deformity.   Neurological: He is alert and oriented to person, place, and time.   Skin: No abrasion, no bruising, no ecchymosis and no rash noted. He is not diaphoretic. No erythema. No pallor.   Psychiatric: He has a normal mood and affect. His mood appears not anxious. His affect is not angry, not blunt, not labile and not inappropriate. His speech is not slurred. He is not agitated, not aggressive, not withdrawn and not combative. He does not exhibit a depressed mood. He is communicative.         Cardiac Procedures  Cardiac Procedures        CATH  5/18 90% PROX LAD MID 70%, Om1 80%, INF BRANCH 95% RCA NON OBSTRUCTIVE DISEASE        CV SURGERY     CABG x 4 5/18 AFTER NONSTEM limA LAD v TO DIAG, v Om1, v Om1   PERIO AFIB          ELECTROPHYSIOLOGY       paf perio op cabg 5/18     ECHO       7/18 ef 50% inf apical akinetic ef 50% mild mr mild as        EKG  nsr on spec st seg flattening    EKGsinus alysia 54 non spec st flattening ant lat t wave inversions no change      Assessment/Plan:      History of coronary artery bypass graft  May 2018 nonstemi  Followed by bypass surgery  Left internal mammary LAD, vein diagonal, vein OM1, vein OM 2  Echocardiogram July 2018 ejection fraction 50% mild aortic stenosis with mean gradient of 10      PAF  (paroxysmal atrial fibrillation) (CMS/HCC) (HCC)  This occurred perioperatively in May has maintained sinus rhythm, I stopped amiodarone today continue beta-blocker    Type 2 diabetes mellitus (CMS/HCC) (HCC)  On insulin and oral therapies    Hyperlipidemia  On high-dose atorvastatin LDL cholesterol 60 I made no changes more is concerned about intolerance with Lipitor 80 but he is doing well    Nonrheumatic aortic valve stenosis  Echo in July inferoapical akinesis ejection fraction 50% mean gradient 10 4 echo with next visit, mild    Malignant neoplasm of sigmoid colon (CMS/HCC)  Workup for anemia picked up colon cancer successfully resected and [facility] by Dr. Aguirre September 2018         He is in [place] after recovering from colon resection for cancer in [facility] he looks terrific  From a cardiac standpoint and stopping amiodarone he had perioperative atrial fibrillation in May no recurrence  Follow-up in 3 months with lab work and resting echocardiogram    Thank you for allowing me to participate in the care of this patient.  I hope this information is helpful.    Fernie Rodriguez MD [facility]   11/6/2018  Travis Griffith MD

## 2018-11-06 NOTE — ASSESSMENT & PLAN NOTE
Echo in July inferoapical akinesis ejection fraction 50% mean gradient 10 4 echo with next visit, mild

## 2019-01-03 ENCOUNTER — TELEPHONE (OUTPATIENT)
Dept: SCHEDULING | Facility: CLINIC | Age: 79
End: 2019-01-03

## 2019-01-03 NOTE — TELEPHONE ENCOUNTER
FYI:    Pt scheduled for ECHO/OV on 2/11.   Pt needs lab perform prior to appt. Pt have appt w/ PCP later this month and will call back with additional lab work needed.

## 2019-02-04 NOTE — PROGRESS NOTES
Cardiology Note    French Garcia DO          Maximus Daniel is a 78 y.o. male 1940   He returns for cardiac follow-up to review lab work and echocardiogram images personally reviewed  He has a history of bypass surgery in 2018 after non-STEMI  Left internal mammary LAD, vein diagonal, vein OM1, vein OM 2  He had emma-op A. fib in May 2018 after bypass surgery was on amiodarone briefly, now on beta-blocker alone  Echocardiogram in July 2018 showed mild aortic stenosis mean gradient 10 ejection fraction 50%        H\Treated for type 2 diabetes hyperlipidemia and sigmoid cancer  Sigmoid resection September 2018 at [facility]  Recent lab work showed a markedly elevated TSH of 50              Electronically signed by Fernie Rodriguez MD at 11/6/2018  2:55 PM        Patient Active Problem List    Diagnosis Date Noted   • Malignant neoplasm of sigmoid colon (CMS/[facility]) 11/06/2018   • Iron deficiency anemia 08/02/2018   • PAF (paroxysmal atrial fibrillation) (CMS/[facility]) ([facility]) 08/02/2018   • History of coronary artery bypass graft 06/11/2018   • Nonrheumatic aortic valve stenosis 06/11/2018   • Acute ST elevation myocardial infarction (STEMI) (CMS/[facility]) ([facility]) 05/19/2018   • Type 2 diabetes mellitus (CMS/[facility]) ([facility]) 05/18/2018   • Incarcerated hernia 05/18/2018   • Hypothyroidism 05/18/2018   • Hyperlipidemia 05/18/2018   • Nephrolithiasis 05/18/2018       Allergy  Patient has no known allergies.    MED LIST     Current Outpatient Prescriptions   Medication Sig Dispense Refill   • aspirin 81 mg enteric coated tablet Take 81 mg by mouth daily.     • atorvastatin (LIPITOR) 80 mg tablet Take 80 mg by mouth daily.     • ferrous sulfate 325 mg (65 mg iron) tablet Take 65 mg by mouth daily with breakfast.     • insulin aspart U-100 (NovoLOG) 100 unit/mL injection Inject under the skin 3 (three) times a day before meals.     • insulin detemir (LEVEMIR FLEXPEN SUBQ) Inject under the skin. 32 units BID     • levothyroxine  (SYNTHROID) 175 mcg tablet Take 1 tablet (175 mcg total) by mouth daily. (Patient taking differently: Take 200 mcg by mouth daily.  ) 90 tablet 3   • metoprolol succinate XL (TOPROL-XL) 25 mg 24 hr tablet Take 25 mg by mouth daily.     • insulin glargine (LANTUS SOLOSTAR) 100 unit/mL (3 mL) subcutaneous pen Inject under the skin nightly.     • insulin lispro (HumaLOG U-100 Insulin) 100 unit/mL vial Inject under the skin once daily.       No current facility-administered medications for this visit.         Review of Systems   Constitution: Positive for malaise/fatigue. Negative for weight gain and weight loss.   HENT: Negative for hearing loss and hoarse voice.    Eyes: Negative for visual disturbance.   Cardiovascular: Negative for chest pain, claudication, cyanosis, dyspnea on exertion, irregular heartbeat, leg swelling, near-syncope, orthopnea, palpitations, paroxysmal nocturnal dyspnea and syncope.   Respiratory: Negative for cough, hemoptysis, shortness of breath, sleep disturbances due to breathing, snoring, sputum production and wheezing.    Endocrine: Negative for cold intolerance and heat intolerance.   Hematologic/Lymphatic: Negative.  Negative for bleeding problem. Does not bruise/bleed easily.   Skin: Negative.  Negative for rash.   Musculoskeletal: Positive for joint pain. Negative for arthritis, falls, muscle cramps and myalgias.   Gastrointestinal: Negative for abdominal pain, anorexia, change in bowel habit, constipation, diarrhea, dysphagia, heartburn, jaundice and nausea.   Genitourinary: Negative for frequency and nocturia.   Neurological: Negative for dizziness, focal weakness, headaches, light-headedness, numbness, tremors and vertigo.   Psychiatric/Behavioral: Negative for memory loss. The patient does not have insomnia and is not nervous/anxious.    Allergic/Immunologic: Negative for hives.       Labs   Lab Results   Component Value Date    WBC 7.81 05/26/2018    HGB 9.8 (L) 05/26/2018    HCT  "31.3 (L) 05/26/2018     (H) 05/26/2018    CHOL 152 05/19/2018    TRIG 101 05/19/2018    HDL 29 (L) 05/19/2018    LDLCALC 103 (H) 05/19/2018    ALT 53 05/18/2018     (H) 05/18/2018     05/26/2018    K 3.8 05/26/2018    CL 99 05/26/2018    CREATININE 1.3 05/26/2018    BUN 18 05/26/2018    CO2 30 05/26/2018    INR 1.6 05/20/2018    HGBA1C 10.5 (H) 05/19/2018       Lab Results   Component Value Date    GLUCOSE 94 05/26/2018    CALCIUM 8.2 (L) 05/26/2018     05/26/2018    K 3.8 05/26/2018    CO2 30 05/26/2018    CL 99 05/26/2018    BUN 18 05/26/2018    CREATININE 1.3 05/26/2018           LABS January 2019    Cholesterol 136  HDL 43 triglycerides 113 LDL 73  Glucose 211  Creatinine 1.3  Potassium 4.4  Hemoglobin A1c 10  TSH 50        Objective   Vitals:    02/11/19 1559 02/11/19 1615   BP: 102/80 130/80   Pulse: (!) 57    SpO2: 98%    Height: 1.6 m (5' 3\")      Physical Exam   Constitutional: He is oriented to person, place, and time. He appears well-developed and well-nourished. He is active.  Non-toxic appearance. He does not have a sickly appearance. He does not appear ill. No distress. He is not intubated.   HENT:   Head: Normocephalic. Not microcephalic. Head is without raccoon's eyes, without abrasion and without contusion.   Nose: Nose normal.   Eyes: EOM are normal. Left eye exhibits no discharge and no exudate. Right conjunctiva is not injected. Left conjunctiva is not injected. Left conjunctiva has no hemorrhage. No scleral icterus. Pupils are equal.   Neck: Normal range of motion. Neck supple. Normal carotid pulses and no hepatojugular reflux present. Carotid bruit is not present.   Cardiovascular: Normal rate, regular rhythm, normal heart sounds, intact distal pulses and normal pulses.  PMI is not displaced.  Exam reveals no gallop and no friction rub.    No murmur heard.  1/6 systolic murmur   Pulmonary/Chest: Effort normal and breath sounds normal. No stridor. No apnea, no tachypnea " and no bradypnea. He is not intubated. No respiratory distress. He has no wheezes. He has no rales. He exhibits no tenderness.   Abdominal: Soft. Normal appearance, normal aorta and bowel sounds are normal. He exhibits no distension. There is no tenderness.   Musculoskeletal: Normal range of motion. He exhibits no edema or deformity.   Neurological: He is alert and oriented to person, place, and time.   Skin: No abrasion, no bruising, no ecchymosis and no rash noted. He is not diaphoretic. No erythema. No pallor.   Psychiatric: He has a normal mood and affect. His mood appears not anxious. His affect is not angry, not blunt, not labile and not inappropriate. His speech is not slurred. He is not agitated, not aggressive, not withdrawn and not combative. He does not exhibit a depressed mood. He is communicative.       Cardiac Procedures  Cardiac Procedures  Cardiac Procedures        CATH  5/18 90% PROX LAD MID 70%, Om1 80%, INF BRANCH 95% RCA NON OBSTRUCTIVE DISEASE        CV SURGERY     CABG x 4 5/18 AFTER NONSTEM limA LAD v TO DIAG, v Om1, v Om1   PERIO AFIB        ELECTROPHYSIOLOGY       paf perio op cabg 5/18     ECHO       7/18 ef 50% inf apical akinetic ef 50% mild mr mild as    February 2019 infer lateral hypokinetic ejection fraction 45% mild left ear mild MR mild aortic stenosis peak gradient 2 m/s       EKG  nsr on spec st seg flattening     EKGsinus alysia 54 non spec st flattening ant lat t wave inversions no change  EKG    Assessment/Plan:  History of coronary artery bypass graft  In May 2018 he had NONSTEMI  With bypass surgery  Left internal mammary LAD, vein diagonal vein marginal vein in 1 to he had emma-op atrial fibrillation was on amiodarone for a brief period of time resolved now on beta-blocker alone  Today suggest mild increase of LV systolic function from previous echo 6 months ago spent 45% he is extremely hypothyroid which may be contributing to this, he also has mild aortic stenosis  inferolateral hypokinetic  We will repeat echocardiogram in 6 months after he is euthyroid LV systolic function is still down we will add losartan    Hypothyroidism  Lab work from January showed markedly elevated TSH of 50    Type 2 diabetes mellitus (CMS/HCC) (HCC)  On insulin hemoglobin A1c 10    PAF (paroxysmal atrial fibrillation) (CMS/HCC) (HCC)  Had paroxysmal atrial fibrillation around time of bypass surgery May 2018 no clinical recurrence is on amiodarone briefly    Hyperlipidemia  On atorvastatin 80 LDL cholesterol 70    Malignant neoplasm of sigmoid colon (CMS/HCC)  Resection by Dr. 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  I hope this information is helpful.    Fernie Rodriguez MD [facility]   2/11/2019  French Han DO

## 2019-02-08 NOTE — TELEPHONE ENCOUNTER
I called pt's dgt Gina to confirm Echo/OV -- 2/11/19- He will be here.    He had labs done @ PCP.    I faxed PCP for labs.

## 2019-02-11 ENCOUNTER — OFFICE VISIT (OUTPATIENT)
Dept: CARDIOLOGY | Facility: CLINIC | Age: 79
End: 2019-02-11
Payer: MEDICARE

## 2019-02-11 ENCOUNTER — HOSPITAL ENCOUNTER (OUTPATIENT)
Dept: CARDIOLOGY | Facility: CLINIC | Age: 79
Discharge: HOME | End: 2019-02-11
Attending: INTERNAL MEDICINE
Payer: MEDICARE

## 2019-02-11 ENCOUNTER — TELEPHONE (OUTPATIENT)
Dept: CARDIOLOGY | Facility: CLINIC | Age: 79
End: 2019-02-11

## 2019-02-11 VITALS
HEIGHT: 63 IN | SYSTOLIC BLOOD PRESSURE: 130 MMHG | HEART RATE: 57 BPM | DIASTOLIC BLOOD PRESSURE: 80 MMHG | BODY MASS INDEX: 33.66 KG/M2 | OXYGEN SATURATION: 98 %

## 2019-02-11 VITALS
SYSTOLIC BLOOD PRESSURE: 100 MMHG | BODY MASS INDEX: 33.66 KG/M2 | DIASTOLIC BLOOD PRESSURE: 54 MMHG | WEIGHT: 190 LBS | HEIGHT: 63 IN

## 2019-02-11 DIAGNOSIS — E03.9 HYPOTHYROIDISM, UNSPECIFIED TYPE: ICD-10-CM

## 2019-02-11 DIAGNOSIS — I35.0 NONRHEUMATIC AORTIC VALVE STENOSIS: ICD-10-CM

## 2019-02-11 DIAGNOSIS — I21.21 ACUTE ST ELEVATION MYOCARDIAL INFARCTION (STEMI) INVOLVING LEFT CIRCUMFLEX CORONARY ARTERY (CMS/HCC): ICD-10-CM

## 2019-02-11 DIAGNOSIS — I21.21 ACUTE ST ELEVATION MYOCARDIAL INFARCTION (STEMI) INVOLVING LEFT CIRCUMFLEX CORONARY ARTERY (CMS/HCC): Primary | ICD-10-CM

## 2019-02-11 DIAGNOSIS — E11.8 TYPE 2 DIABETES MELLITUS WITH COMPLICATION, WITHOUT LONG-TERM CURRENT USE OF INSULIN (CMS/HCC): ICD-10-CM

## 2019-02-11 DIAGNOSIS — E78.5 HYPERLIPIDEMIA, UNSPECIFIED HYPERLIPIDEMIA TYPE: ICD-10-CM

## 2019-02-11 DIAGNOSIS — I48.0 PAF (PAROXYSMAL ATRIAL FIBRILLATION) (CMS/HCC): ICD-10-CM

## 2019-02-11 LAB
AORTIC ROOT ANNULUS: 3.3 CM
AORTIC VALVE MEAN VELOCITY: 1.37 M/S
AORTIC VALVE VELOCITY TIME INTEGRAL: 45.7 CM
ASCENDING AORTA: 3.4 CM
AV MEAN GRADIENT: 8 MMHG
AV PEAK GRADIENT: 13 MMHG
AV PEAK VELOCITY-S: 1.78 M/S
AV VALVE AREA: 1.36 CM2
BSA FOR ECHO PROCEDURE: 1.96 M2
CUSP SEPARATION: 1.5 CM
DOP CALC LVOT STROKE VOLUME: 62.17 ML
E WAVE DECELERATION TIME: 257 MS
E/A RATIO: 0.7
E/E' RATIO: 16.7
E/LAT E' RATIO: 10.3
EDV (BP): 82.5 CM3
EF (A4C): 44.4 %
EF A2C: 48.1 %
EJECTION FRACTION: 46.5 %
ESV (BP): 44.1 CM3
FRACTIONAL SHORTENING: 9.5 %
INTERVENTRICULAR SEPTUM: 1.1 CM
LA ESV (BP): 46.9 CM3
LA ESV INDEX (A2C): 20.36 CM3/M2
LA ESV INDEX (BP): 23.93 CM3/M2
LA/AORTA RATIO: 1.18
LAAS-AP2: 17.1 CM2
LAAS-AP4: 20.2 CM2
LAD 2D: 3.9 CM
LALD A4C: 5.69 CM
LALD A4C: 5.71 CM
LAV-S: 39.9 CM3
LEFT ATRIUM VOLUME INDEX: 28.47 CM3/M2
LEFT ATRIUM VOLUME: 55.8 CM3
LEFT INTERNAL DIMENSION IN SYSTOLE: 3.24 CM (ref 2.66–4.02)
LEFT VENTRICLE DIASTOLIC VOLUME INDEX: 42.19 CM3/M2
LEFT VENTRICLE DIASTOLIC VOLUME: 82.7 CM3
LEFT VENTRICLE SYSTOLIC VOLUME INDEX: 23.52 CM3/M2
LEFT VENTRICLE SYSTOLIC VOLUME: 46.1 CM3
LEFT VENTRICULAR INTERNAL DIMENSION IN DIASTOLE: 3.58 CM (ref 4.5–6.24)
LEFT VENTRICULAR POSTERIOR WALL IN END DIASTOLE: 1.13 CM (ref 0.59–1.1)
LV DIASTOLIC VOLUME: 82.3 CM3
LV ESV (APICAL 2 CHAMBER): 42.6 CM3
LVAD-AP2: 26 CM2
LVAD-AP4: 26.1 CM2
LVAS-AP2: 17.5 CM2
LVAS-AP4: 17.8 CM2
LVEDVI(A2C): 41.99 CM3/M2
LVEDVI(BP): 42.09 CM3/M2
LVESVI(A2C): 21.73 CM3/M2
LVESVI(BP): 22.5 CM3/M2
LVLD-AP2: 6.99 CM
LVLD-AP4: 6.95 CM
LVLS-AP2: 6.28 CM
LVLS-AP4: 6.2 CM
LVOT 2D: 2 CM
LVOT A: 3.14 CM2
LVOT MG: 2 MMHG
LVOT MV: 0.63 M/S
LVOT PEAK VELOCITY: 0.89 M/S
LVOT VTI: 19.8 CM
MV E'TISSUE VEL-LAT: 0.07 M/S
MV E'TISSUE VEL-MED: 0.04 M/S
MV PEAK A VEL: 1.04 M/S
MV PEAK E VEL: 0.7 M/S
POSTERIOR WALL: 1.13 CM
PV PEAK GRADIENT: 4 MMHG
PV PV: 0.98 M/S
RVOT VMAX: 0.7 M/S
SEPTAL TISSUE DOPPLER FREE WALL LATE DIA VELOCITY (APICAL 4 CHAMBER VIEW): 0.8 M/S
Z-SCORE OF LEFT VENTRICULAR DIMENSION IN END DIASTOLE: -3.92
Z-SCORE OF LEFT VENTRICULAR DIMENSION IN END SYSTOLE: -0.06
Z-SCORE OF LEFT VENTRICULAR POSTERIOR WALL IN END DIASTOLE: 1.78

## 2019-02-11 PROCEDURE — 93000 ELECTROCARDIOGRAM COMPLETE: CPT | Performed by: INTERNAL MEDICINE

## 2019-02-11 PROCEDURE — 99214 OFFICE O/P EST MOD 30 MIN: CPT | Performed by: INTERNAL MEDICINE

## 2019-02-11 PROCEDURE — 93306 TTE W/DOPPLER COMPLETE: CPT | Performed by: INTERNAL MEDICINE

## 2019-02-11 RX ORDER — INSULIN ASPART 100 [IU]/ML
INJECTION, SOLUTION INTRAVENOUS; SUBCUTANEOUS
COMMUNITY

## 2019-02-11 ASSESSMENT — ENCOUNTER SYMPTOMS
HOARSE VOICE: 0
WHEEZING: 0
INSOMNIA: 0
NEAR-SYNCOPE: 0
CONSTIPATION: 0
ABDOMINAL PAIN: 0
BRUISES/BLEEDS EASILY: 0
LIGHT-HEADEDNESS: 0
WEIGHT GAIN: 0
FOCAL WEAKNESS: 0
ANOREXIA: 0
CHANGE IN BOWEL HABIT: 0
ORTHOPNEA: 0
NUMBNESS: 0
TREMORS: 0
HEADACHES: 0
SPUTUM PRODUCTION: 0
MYALGIAS: 0
COUGH: 0
PALPITATIONS: 0
DYSPNEA ON EXERTION: 0
WEIGHT LOSS: 0
PND: 0
SNORING: 0
FALLS: 0
DIZZINESS: 0
HEARTBURN: 0
SYNCOPE: 0
IRREGULAR HEARTBEAT: 0
HEMOPTYSIS: 0
SHORTNESS OF BREATH: 0
HEMATOLOGIC/LYMPHATIC NEGATIVE: 1
DIARRHEA: 0
MUSCLE CRAMPS: 0
CLAUDICATION: 0
JAUNDICE: 0
NERVOUS/ANXIOUS: 0
VERTIGO: 0
FREQUENCY: 0
NAUSEA: 0
SLEEP DISTURBANCES DUE TO BREATHING: 0
MEMORY LOSS: 0

## 2019-02-11 NOTE — TELEPHONE ENCOUNTER
Please precert for a transthoracic echo.  [facility]

## 2019-02-11 NOTE — ASSESSMENT & PLAN NOTE
Had paroxysmal atrial fibrillation around time of bypass surgery May 2018 no clinical recurrence is on amiodarone briefly

## 2019-02-11 NOTE — LETTER
February 11, 2019     French Garcia DO  [address]  [place] [address]    Patient: Maximus Daniel   YOB: 1940   Date of Visit: 2/11/2019       Dear Luis  Thank you for referring Maximus Daniel to me for evaluation. Below are my notes for this consultation.    If you have questions, please do not hesitate to call me. I look forward to following your patient along with you.         Sincerely,        Fernie Rodriguez MD        CC: No Recipients  Fernie Rodriguez MD  2/11/2019  4:40 PM  Sign at close encounter      Cardiology Note    French Garcia DO          Maximus Daniel is a 78 y.o. male 1940   He returns for cardiac follow-up to review lab work and echocardiogram images personally reviewed  He has a history of bypass surgery in 2018 after non-STEMI  Left internal mammary LAD, vein diagonal, vein OM1, vein OM 2  He had emma-op A. fib in May 2018 after bypass surgery was on amiodarone briefly, now on beta-blocker alone  Echocardiogram in July 2018 showed mild aortic stenosis mean gradient 10 ejection fraction 50%        H\Treated for type 2 diabetes hyperlipidemia and sigmoid cancer  Sigmoid resection September 2018 at [facility]  Recent lab work showed a markedly elevated TSH of 50              Electronically signed by Fernie Rodriguez MD at 11/6/2018  2:55 PM        Patient Active Problem List    Diagnosis Date Noted   • Malignant neoplasm of sigmoid colon (CMS/HCC) 11/06/2018   • Iron deficiency anemia 08/02/2018   • PAF (paroxysmal atrial fibrillation) (CMS/HCC) ([facility]) 08/02/2018   • History of coronary artery bypass graft 06/11/2018   • Nonrheumatic aortic valve stenosis 06/11/2018   • Acute ST elevation myocardial infarction (STEMI) (CMS/HCC) ([facility]) 05/19/2018   • Type 2 diabetes mellitus (CMS/HCC) ([facility]) 05/18/2018   • Incarcerated hernia 05/18/2018   • Hypothyroidism 05/18/2018   • Hyperlipidemia 05/18/2018   • Nephrolithiasis 05/18/2018       Allergy  Patient has no  known allergies.    MED LIST     Current Outpatient Prescriptions   Medication Sig Dispense Refill   • aspirin 81 mg enteric coated tablet Take 81 mg by mouth daily.     • atorvastatin (LIPITOR) 80 mg tablet Take 80 mg by mouth daily.     • ferrous sulfate 325 mg (65 mg iron) tablet Take 65 mg by mouth daily with breakfast.     • insulin aspart U-100 (NovoLOG) 100 unit/mL injection Inject under the skin 3 (three) times a day before meals.     • insulin detemir (LEVEMIR FLEXPEN SUBQ) Inject under the skin. 32 units BID     • levothyroxine (SYNTHROID) 175 mcg tablet Take 1 tablet (175 mcg total) by mouth daily. (Patient taking differently: Take 200 mcg by mouth daily.  ) 90 tablet 3   • metoprolol succinate XL (TOPROL-XL) 25 mg 24 hr tablet Take 25 mg by mouth daily.     • insulin glargine (LANTUS SOLOSTAR) 100 unit/mL (3 mL) subcutaneous pen Inject under the skin nightly.     • insulin lispro (HumaLOG U-100 Insulin) 100 unit/mL vial Inject under the skin once daily.       No current facility-administered medications for this visit.         Review of Systems   Constitution: Positive for malaise/fatigue. Negative for weight gain and weight loss.   HENT: Negative for hearing loss and hoarse voice.    Eyes: Negative for visual disturbance.   Cardiovascular: Negative for chest pain, claudication, cyanosis, dyspnea on exertion, irregular heartbeat, leg swelling, near-syncope, orthopnea, palpitations, paroxysmal nocturnal dyspnea and syncope.   Respiratory: Negative for cough, hemoptysis, shortness of breath, sleep disturbances due to breathing, snoring, sputum production and wheezing.    Endocrine: Negative for cold intolerance and heat intolerance.   Hematologic/Lymphatic: Negative.  Negative for bleeding problem. Does not bruise/bleed easily.   Skin: Negative.  Negative for rash.   Musculoskeletal: Positive for joint pain. Negative for arthritis, falls, muscle cramps and myalgias.   Gastrointestinal: Negative for  "abdominal pain, anorexia, change in bowel habit, constipation, diarrhea, dysphagia, heartburn, jaundice and nausea.   Genitourinary: Negative for frequency and nocturia.   Neurological: Negative for dizziness, focal weakness, headaches, light-headedness, numbness, tremors and vertigo.   Psychiatric/Behavioral: Negative for memory loss. The patient does not have insomnia and is not nervous/anxious.    Allergic/Immunologic: Negative for hives.       Labs   Lab Results   Component Value Date    WBC 7.81 05/26/2018    HGB 9.8 (L) 05/26/2018    HCT 31.3 (L) 05/26/2018     (H) 05/26/2018    CHOL 152 05/19/2018    TRIG 101 05/19/2018    HDL 29 (L) 05/19/2018    LDLCALC 103 (H) 05/19/2018    ALT 53 05/18/2018     (H) 05/18/2018     05/26/2018    K 3.8 05/26/2018    CL 99 05/26/2018    CREATININE 1.3 05/26/2018    BUN 18 05/26/2018    CO2 30 05/26/2018    INR 1.6 05/20/2018    HGBA1C 10.5 (H) 05/19/2018       Lab Results   Component Value Date    GLUCOSE 94 05/26/2018    CALCIUM 8.2 (L) 05/26/2018     05/26/2018    K 3.8 05/26/2018    CO2 30 05/26/2018    CL 99 05/26/2018    BUN 18 05/26/2018    CREATININE 1.3 05/26/2018           LABS January 2019    Cholesterol 136  HDL 43 triglycerides 113 LDL 73  Glucose 211  Creatinine 1.3  Potassium 4.4  Hemoglobin A1c 10  TSH 50        Objective   Vitals:    02/11/19 1559 02/11/19 1615   BP: 102/80 130/80   Pulse: (!) 57    SpO2: 98%    Height: 1.6 m (5' 3\")      Physical Exam   Constitutional: He is oriented to person, place, and time. He appears well-developed and well-nourished. He is active.  Non-toxic appearance. He does not have a sickly appearance. He does not appear ill. No distress. He is not intubated.   HENT:   Head: Normocephalic. Not microcephalic. Head is without raccoon's eyes, without abrasion and without contusion.   Nose: Nose normal.   Eyes: EOM are normal. Left eye exhibits no discharge and no exudate. Right conjunctiva is not injected. " Left conjunctiva is not injected. Left conjunctiva has no hemorrhage. No scleral icterus. Pupils are equal.   Neck: Normal range of motion. Neck supple. Normal carotid pulses and no hepatojugular reflux present. Carotid bruit is not present.   Cardiovascular: Normal rate, regular rhythm, normal heart sounds, intact distal pulses and normal pulses.  PMI is not displaced.  Exam reveals no gallop and no friction rub.    No murmur heard.  1/6 systolic murmur   Pulmonary/Chest: Effort normal and breath sounds normal. No stridor. No apnea, no tachypnea and no bradypnea. He is not intubated. No respiratory distress. He has no wheezes. He has no rales. He exhibits no tenderness.   Abdominal: Soft. Normal appearance, normal aorta and bowel sounds are normal. He exhibits no distension. There is no tenderness.   Musculoskeletal: Normal range of motion. He exhibits no edema or deformity.   Neurological: He is alert and oriented to person, place, and time.   Skin: No abrasion, no bruising, no ecchymosis and no rash noted. He is not diaphoretic. No erythema. No pallor.   Psychiatric: He has a normal mood and affect. His mood appears not anxious. His affect is not angry, not blunt, not labile and not inappropriate. His speech is not slurred. He is not agitated, not aggressive, not withdrawn and not combative. He does not exhibit a depressed mood. He is communicative.       Cardiac Procedures  Cardiac Procedures  Cardiac Procedures        CATH  5/18 90% PROX LAD MID 70%, Om1 80%, INF BRANCH 95% RCA NON OBSTRUCTIVE DISEASE        CV SURGERY     CABG x 4 5/18 AFTER NONSTEM limA LAD v TO DIAG, v Om1, v Om1   PERIO AFIB        ELECTROPHYSIOLOGY       paf perio op cabg 5/18     ECHO       7/18 ef 50% inf apical akinetic ef 50% mild mr mild as    February 2019 infer lateral hypokinetic ejection fraction 45% mild left ear mild MR mild aortic stenosis peak gradient 2 m/s       EKG  nsr on spec st seg flattening     EKGsinus alysia 54 non  spec st flattening ant lat t wave inversions no change  EKG    Assessment/Plan:  History of coronary artery bypass graft  In May 2018 he had NONSTEMI  With bypass surgery  Left internal mammary LAD, vein diagonal vein marginal vein in 1 to he had emma-op atrial fibrillation was on amiodarone for a brief period of time resolved now on beta-blocker alone  Today suggest mild increase of LV systolic function from previous echo 6 months ago spent 45% he is extremely hypothyroid which may be contributing to this, he also has mild aortic stenosis inferolateral hypokinetic  We will repeat echocardiogram in 6 months after he is euthyroid LV systolic function is still down we will add losartan    Hypothyroidism  Lab work from January showed markedly elevated TSH of 50    Type 2 diabetes mellitus (CMS/HCC) (HCC)  On insulin hemoglobin A1c 10    PAF (paroxysmal atrial fibrillation) (CMS/HCC) (HCC)  Had paroxysmal atrial fibrillation around time of bypass surgery May 2018 no clinical recurrence is on amiodarone briefly    Hyperlipidemia  On atorvastatin 80 LDL cholesterol 70    Malignant neoplasm of sigmoid colon (CMS/HCC)  Resection by Dr. 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  I hope this information is helpful.    Fernie Rodriguez MD [facility]   2/11/2019  Cc French Garcia, DO

## 2019-02-11 NOTE — ASSESSMENT & PLAN NOTE
Echo today mild left ear inferolateral hypokinesis ejection fraction 45% peak gradient 2 m/s mild to moderate mitral regurgitation

## 2019-02-11 NOTE — ASSESSMENT & PLAN NOTE
In May 2018 he had NONSTEMI  With bypass surgery  Left internal mammary LAD, vein diagonal vein marginal vein in 1 to he had emma-op atrial fibrillation was on amiodarone for a brief period of time resolved now on beta-blocker alone  Today suggest mild increase of LV systolic function from previous echo 6 months ago spent 45% he is extremely hypothyroid which may be contributing to this, he also has mild aortic stenosis inferolateral hypokinetic  We will repeat echocardiogram in 6 months after he is euthyroid LV systolic function is still down we will add losartan

## 2019-07-31 ASSESSMENT — ENCOUNTER SYMPTOMS
HEADACHES: 0
PALPITATIONS: 0
MUSCLE CRAMPS: 0
WHEEZING: 0
SPUTUM PRODUCTION: 0
ABDOMINAL PAIN: 0
SHORTNESS OF BREATH: 0
BRUISES/BLEEDS EASILY: 0
NEAR-SYNCOPE: 0
CHANGE IN BOWEL HABIT: 0
FALLS: 0
HEMOPTYSIS: 0
ANOREXIA: 0
WEIGHT LOSS: 0
DIZZINESS: 0
VERTIGO: 0
SYNCOPE: 0
NERVOUS/ANXIOUS: 0
NAUSEA: 0
JAUNDICE: 0
MYALGIAS: 0
ORTHOPNEA: 0
FOCAL WEAKNESS: 0
HEMATOLOGIC/LYMPHATIC NEGATIVE: 1
FREQUENCY: 0
DIARRHEA: 0
IRREGULAR HEARTBEAT: 0
PND: 0
TREMORS: 0
CLAUDICATION: 0
HOARSE VOICE: 0
NUMBNESS: 0
CONSTIPATION: 0
SNORING: 0
WEIGHT GAIN: 0
INSOMNIA: 0
MEMORY LOSS: 0
DYSPNEA ON EXERTION: 0
COUGH: 0
SLEEP DISTURBANCES DUE TO BREATHING: 0
HEARTBURN: 0
LIGHT-HEADEDNESS: 0

## 2019-07-31 NOTE — PROGRESS NOTES
Cardiology Note    French Garcia DO William H Fredy is a 79 y.o. 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  Echo cardiogram today stable inferior apical akinesis mild aortic stenosis mean gradient 9 ejection fraction 50% moderate mitral regurgitation    He feels tired no chest pain  Lab work stools show significant hypothyroidism with a TSH of 16 but this is improved  Hemoglobin A1c is 11.4  He tells me he will be establishing care with endocrinology at [facility]        Patient Active Problem List    Diagnosis Date Noted   • Malignant neoplasm of sigmoid colon (CMS/[facility]) 11/06/2018   • Iron deficiency anemia 08/02/2018   • PAF (paroxysmal atrial fibrillation) (CMS/[facility]) ([facility]) 08/02/2018   • History of coronary artery bypass graft 06/11/2018   • Nonrheumatic aortic valve stenosis 06/11/2018   • Acute ST elevation myocardial infarction (STEMI) (CMS/[facility]) ([facility]) 05/19/2018   • Type 2 diabetes mellitus (CMS/[facility]) ([facility]) 05/18/2018   • Incarcerated hernia 05/18/2018   • Hypothyroidism 05/18/2018   • Hyperlipidemia 05/18/2018   • Nephrolithiasis 05/18/2018       Allergy  Patient has no known allergies.    MED LIST     Current Outpatient Prescriptions   Medication Sig Dispense Refill   • aspirin 81 mg enteric coated tablet Take 81 mg by mouth daily.     • ferrous sulfate 325 mg (65 mg iron) tablet Take 65 mg by mouth daily with breakfast.     • insulin aspart U-100 (NovoLOG) 100 unit/mL injection Inject under the skin 3 (three) times a day before meals. Pt takes 12 units TID, and  an additional 400units as directed      • insulin detemir (LEVEMIR FLEXPEN SUBQ) Inject under the skin. 50 units BID      • levothyroxine (SYNTHROID) 175 mcg tablet Take 1 tablet (175 mcg total) by mouth daily. (Patient taking differently: Take 225 mcg by mouth daily.  ) 90 tablet 3   • metoprolol succinate XL (TOPROL-XL) 25 mg 24 hr tablet Take 1 tablet (25 mg total) by mouth daily. 90 tablet 6   • insulin glargine (LANTUS SOLOSTAR) 100 unit/mL (3 mL) subcutaneous pen Inject under the skin nightly.     • insulin lispro (HumaLOG U-100 Insulin) 100 unit/mL vial Inject under the skin once daily.     • rosuvastatin (CRESTOR) 40 mg tablet Take 1 tablet (40 mg total) by mouth daily. 90 tablet 1     No current facility-administered medications for this visit.         Review of Systems   Constitution: Positive for malaise/fatigue. Negative for weight gain and weight loss.   HENT: Negative for hearing loss and hoarse voice.    Eyes: Negative for visual disturbance.   Cardiovascular: Negative for chest pain, claudication, cyanosis, dyspnea on exertion, irregular heartbeat, leg swelling, near-syncope, orthopnea, palpitations, paroxysmal nocturnal dyspnea and syncope.   Respiratory: Negative for cough, hemoptysis, shortness of breath, sleep disturbances due to breathing, snoring, sputum production and wheezing.    Endocrine: Negative for cold intolerance and heat intolerance.   Hematologic/Lymphatic: Negative.  Negative for bleeding problem. Does not bruise/bleed easily.   Skin: Negative.  Negative for rash.   Musculoskeletal: Positive for joint pain. Negative for arthritis, falls, muscle cramps and myalgias.   Gastrointestinal: Negative for abdominal pain, anorexia, change in bowel habit, constipation, diarrhea, dysphagia, heartburn, jaundice and nausea.   Genitourinary: Negative for frequency and nocturia.   Neurological: Negative for dizziness, focal weakness, headaches, light-headedness, numbness, tremors and vertigo.  "  Psychiatric/Behavioral: Negative for memory loss. The patient does not have insomnia and is not nervous/anxious.    Allergic/Immunologic: Negative for hives.       Labs 7/19                                      Lab Results   Component Value Date    WBC 7.81 05/26/2018    HGB 9.8 (L) 05/26/2018    HCT 31.3 (L) 05/26/2018     (H) 05/26/2018    CHOL 152 05/19/2018    TRIG 101 05/19/2018    HDL 29 (L) 05/19/2018    LDLCALC 103 (H) 05/19/2018    ALT 53 05/18/2018     (H) 05/18/2018     05/26/2018    K 3.8 05/26/2018    CL 99 05/26/2018    CREATININE 1.3 05/26/2018    BUN 18 05/26/2018    CO2 30 05/26/2018    INR 1.6 05/20/2018    HGBA1C 10.5 (H) 05/19/2018       Lab Results   Component Value Date    GLUCOSE 94 05/26/2018    CALCIUM 8.2 (L) 05/26/2018     05/26/2018    K 3.8 05/26/2018    CO2 30 05/26/2018    CL 99 05/26/2018    BUN 18 05/26/2018    CREATININE 1.3 05/26/2018           LABS January 2019    Cholesterol 136  HDL 43 triglycerides 113 LDL 73  Glucose 211  Creatinine 1.3  Potassium 4.4  Hemoglobin A1c 10  TSH 50        Objective   Vitals:    08/05/19 1302   BP: 122/74   Pulse: 60   SpO2: 96%   Weight: 86.2 kg (190 lb)   Height: 1.6 m (5' 3\")     Physical Exam   Constitutional: He is oriented to person, place, and time. He appears well-developed and well-nourished. He is active.  Non-toxic appearance. He does not have a sickly appearance. He does not appear ill. No distress. He is not intubated.   HENT:   Head: Normocephalic. Not microcephalic. Head is without raccoon's eyes, without abrasion and without contusion.   Nose: Nose normal.   Eyes: EOM are normal. Left eye exhibits no discharge and no exudate. Right conjunctiva is not injected. Left conjunctiva is not injected. Left conjunctiva has no hemorrhage. No scleral icterus. Pupils are equal.   Neck: Normal range of motion. Neck supple. Normal carotid pulses and no hepatojugular reflux present. Carotid bruit is not present. "   Cardiovascular: Normal rate, regular rhythm, normal heart sounds, intact distal pulses and normal pulses.  PMI is not displaced.  Exam reveals no gallop and no friction rub.    No murmur heard.  1/6 systolic murmur   Pulmonary/Chest: Effort normal and breath sounds normal. No stridor. No apnea, no tachypnea and no bradypnea. He is not intubated. No respiratory distress. He has no wheezes. He has no rales. He exhibits no tenderness.   Abdominal: Soft. Normal appearance, normal aorta and bowel sounds are normal. He exhibits no distension. There is no tenderness.   Musculoskeletal: Normal range of motion. He exhibits no edema or deformity.   Neurological: He is alert and oriented to person, place, and time.   Skin: No abrasion, no bruising, no ecchymosis and no rash noted. He is not diaphoretic. No erythema. No pallor.   Psychiatric: He has a normal mood and affect. His mood appears not anxious. His affect is not angry, not blunt, not labile and not inappropriate. His speech is not slurred. He is not agitated, not aggressive, not withdrawn and not combative. He does not exhibit a depressed mood. He is communicative.       Cardiac Procedures  Cardiac Procedures  Cardiac Procedures        CATH  5/18 90% PROX LAD MID 70%, Om1 80%, INF BRANCH 95% RCA NON OBSTRUCTIVE DISEASE        CV SURGERY     CABG x 4 5/18 AFTER NONSTEM limA LAD v TO DIAG, v Om1, v Om1   PERIO AFIB        ELECTROPHYSIOLOGY       paf perio op cabg 5/18     ECHO       July 2019 ef 50% inf apical akinetic ef 50% mild mr mild as mean gradient 9 February 2019 infer lateral hypokinetic ejection fraction 45% mild left ear mild MR mild aortic stenosis peak gradient 2 m/s       EKG  nsr on spec st seg inf lat t wave inversions flattening     EKGsinus alysia 54 non spec st flattening ant lat t wave inversions no change  EKG    Assessment/Plan:  Acute ST elevation myocardial infarction (STEMI) (CMS/[facility]) ([facility])  May 2018 acute inferior infarction  Bypass surgery  left internal mammary LAD vein to diagonal vein OM1 vein OM 2  He had perioperative atrial fibrillation  Echo today stable regional wall motion abnormality ejection fraction 50% mild aortic stenosis mean gradient 9  We will order pharmacologic nuclear stress test at next visit    Nonrheumatic aortic valve stenosis  With echo today inferior apical akinesis EF 50% peak gradient 2.2 m/s mean gradient 9 across aortic valve mild to moderate mitral regurgitation    Hyperlipidemia  On atorvastatin 80 LDL cholesterol 69 potential for drug interaction without high-dose statin will change to rosuvastatin 40 no other changes made    History of coronary artery bypass graft  As above May 2018 left internal mammary LAD, vein diagonal, vein OM1, vein OM 2 acute closure OM 2 with myocardial infarction    Hypothyroidism  Moving in the right direction but still has elevated TSH of 11 have been 50 in the past, following up with endocrinology at [place]    Type 2 diabetes mellitus (CMS/[facility]) ([facility])  On insulin hemoglobin A1c 11.4 again will establish care with endocrinology pain at [facility]    Malignant neoplasm of sigmoid colon (CMS/[facility])  [facility] September 2018    PAF (paroxysmal atrial fibrillation) (CMS/[facility]) ([facility])  Brief episode perioperatively with bypass surgery May 2018 no clinical recurrence         Main lab work abnormalities are significant hypothyroidism hyperglycemia  He is going to establish care with endocrinology at [facility]  Cardiac wise things are stable echo is unchanged mild aortic stenosis and regional wall motion abnormality compatible with his old myocardial infarction  The only change I made was going from atorvastatin 80 to rosuvastatin 40 less drug drug interactions with this medication  I will see him in 6 months with lab work  We will probably order pharmacologic nuclear stress test at that visit    Thank you for allowing me to participate in the care of this  patient.  I hope this information is helpful.  [[''    This letter was generated using speech recognition software.  Please excuse any typographical errors.  Fernie Rodriguez MD [facility]   8/5/2019  Cc French Garcia, DO

## 2019-08-05 ENCOUNTER — OFFICE VISIT (OUTPATIENT)
Dept: CARDIOLOGY | Facility: CLINIC | Age: 79
End: 2019-08-05
Payer: MEDICARE

## 2019-08-05 ENCOUNTER — HOSPITAL ENCOUNTER (OUTPATIENT)
Dept: CARDIOLOGY | Facility: CLINIC | Age: 79
Discharge: HOME | End: 2019-08-05
Attending: INTERNAL MEDICINE
Payer: MEDICARE

## 2019-08-05 VITALS
HEIGHT: 63 IN | BODY MASS INDEX: 33.66 KG/M2 | SYSTOLIC BLOOD PRESSURE: 130 MMHG | DIASTOLIC BLOOD PRESSURE: 80 MMHG | WEIGHT: 190 LBS

## 2019-08-05 VITALS
HEART RATE: 60 BPM | DIASTOLIC BLOOD PRESSURE: 74 MMHG | WEIGHT: 190 LBS | BODY MASS INDEX: 33.66 KG/M2 | OXYGEN SATURATION: 96 % | SYSTOLIC BLOOD PRESSURE: 122 MMHG | HEIGHT: 63 IN

## 2019-08-05 DIAGNOSIS — I21.21 ACUTE ST ELEVATION MYOCARDIAL INFARCTION (STEMI) INVOLVING LEFT CIRCUMFLEX CORONARY ARTERY (CMS/HCC): Primary | ICD-10-CM

## 2019-08-05 DIAGNOSIS — Z95.1 HISTORY OF CORONARY ARTERY BYPASS GRAFT: ICD-10-CM

## 2019-08-05 DIAGNOSIS — I21.21 ACUTE ST ELEVATION MYOCARDIAL INFARCTION (STEMI) INVOLVING LEFT CIRCUMFLEX CORONARY ARTERY (CMS/HCC): ICD-10-CM

## 2019-08-05 DIAGNOSIS — I48.0 PAF (PAROXYSMAL ATRIAL FIBRILLATION) (CMS/HCC): ICD-10-CM

## 2019-08-05 DIAGNOSIS — I35.0 NONRHEUMATIC AORTIC VALVE STENOSIS: ICD-10-CM

## 2019-08-05 DIAGNOSIS — E03.9 HYPOTHYROIDISM, UNSPECIFIED TYPE: ICD-10-CM

## 2019-08-05 DIAGNOSIS — E11.8 TYPE 2 DIABETES MELLITUS WITH COMPLICATION, WITHOUT LONG-TERM CURRENT USE OF INSULIN (CMS/HCC): ICD-10-CM

## 2019-08-05 LAB
AORTIC ROOT ANNULUS: 3.3 CM
AORTIC VALVE MEAN VELOCITY: 1.43 M/S
AORTIC VALVE VELOCITY TIME INTEGRAL: 46.2 CM
ASCENDING AORTA: 2.9 CM
AV MEAN GRADIENT: 9 MMHG
AV PEAK GRADIENT: 16 MMHG
AV PEAK VELOCITY-S: 2.03 M/S
AV VALVE AREA: 1.69 CM2
BSA FOR ECHO PROCEDURE: 1.96 M2
CUSP SEPARATION: 1.6 CM
DOP CALC LVOT STROKE VOLUME: 77.87 ML
E WAVE DECELERATION TIME: 303 MS
E/A RATIO: 0.6
E/E' RATIO: 12.3
E/LAT E' RATIO: 6.8
EDV (BP): 81.7 CM3
EF (A4C): 53.9 %
EF A2C: 64.9 %
EJECTION FRACTION: 61.9 %
EST RIGHT VENT SYSTOLIC PRESSURE BY TRICUSPID REGURGITATION JET: 22 MMHG
ESV (BP): 31.1 CM3
FRACTIONAL SHORTENING: 18.2 %
INTERVENTRICULAR SEPTUM: 1.2 CM
LA ESV (BP): 42 CM3
LA ESV INDEX (A2C): 23.98 CM3/M2
LA ESV INDEX (BP): 21.43 CM3/M2
LA/AORTA RATIO: 0.91
LAAS-AP2: 18.1 CM2
LAAS-AP4: 15.5 CM2
LAD 2D: 3 CM
LALD A4C: 5.3 CM
LALD A4C: 5.55 CM
LAV-S: 47 CM3
LEFT ATRIUM VOLUME INDEX: 18.37 CM3/M2
LEFT ATRIUM VOLUME: 36 CM3
LEFT INTERNAL DIMENSION IN SYSTOLE: 2.47 CM (ref 2.66–4.02)
LEFT VENTRICLE DIASTOLIC VOLUME INDEX: 44.03 CM3/M2
LEFT VENTRICLE DIASTOLIC VOLUME: 86.3 CM3
LEFT VENTRICLE SYSTOLIC VOLUME INDEX: 20.26 CM3/M2
LEFT VENTRICLE SYSTOLIC VOLUME: 39.7 CM3
LEFT VENTRICULAR INTERNAL DIMENSION IN DIASTOLE: 3.02 CM (ref 4.5–6.24)
LEFT VENTRICULAR POSTERIOR WALL IN END DIASTOLE: 1.21 CM (ref 0.59–1.1)
LV DIASTOLIC VOLUME: 70.6 CM3
LV ESV (APICAL 2 CHAMBER): 24.8 CM3
LVAD-AP2: 22.9 CM2
LVAD-AP4: 26.8 CM2
LVAS-AP2: 12.6 CM2
LVAS-AP4: 16.5 CM2
LVEDVI(A2C): 36.02 CM3/M2
LVEDVI(BP): 41.68 CM3/M2
LVESVI(A2C): 12.65 CM3/M2
LVESVI(BP): 15.87 CM3/M2
LVLD-AP2: 6.45 CM
LVLD-AP4: 7.09 CM
LVLS-AP2: 5.71 CM
LVLS-AP4: 5.59 CM
LVOT 2D: 2 CM
LVOT A: 3.14 CM2
LVOT MG: 3 MMHG
LVOT MV: 0.79 M/S
LVOT PEAK VELOCITY: 1.19 M/S
LVOT VTI: 24.8 CM
MV E'TISSUE VEL-LAT: 0.11 M/S
MV E'TISSUE VEL-MED: 0.06 M/S
MV PEAK A VEL: 1.18 M/S
MV PEAK E VEL: 0.76 M/S
POSTERIOR WALL: 1.21 CM
PV PEAK GRADIENT: 4 MMHG
PV PV: 1.06 M/S
RAP: 5 MMHG
RVOT VMAX: 0.85 M/S
SEPTAL TISSUE DOPPLER FREE WALL LATE DIA VELOCITY (APICAL 4 CHAMBER VIEW): 0.8 M/S
TAPSE: 1.5 CM
TR MAX PG: 17 MMHG
TRICUSPID VALVE PEAK REGURGITATION VELOCITY: 2.04 M/S
Z-SCORE OF LEFT VENTRICULAR DIMENSION IN END DIASTOLE: -5.62
Z-SCORE OF LEFT VENTRICULAR DIMENSION IN END SYSTOLE: -2.21
Z-SCORE OF LEFT VENTRICULAR POSTERIOR WALL IN END DIASTOLE: 2.14

## 2019-08-05 PROCEDURE — 93306 TTE W/DOPPLER COMPLETE: CPT | Performed by: INTERNAL MEDICINE

## 2019-08-05 PROCEDURE — 93000 ELECTROCARDIOGRAM COMPLETE: CPT | Performed by: INTERNAL MEDICINE

## 2019-08-05 PROCEDURE — 99214 OFFICE O/P EST MOD 30 MIN: CPT | Performed by: INTERNAL MEDICINE

## 2019-08-05 RX ORDER — METOPROLOL SUCCINATE 25 MG/1
25 TABLET, EXTENDED RELEASE ORAL DAILY
Qty: 90 TABLET | Refills: 6 | Status: SHIPPED | OUTPATIENT
Start: 2019-08-05 | End: 2020-08-10 | Stop reason: SDUPTHER

## 2019-08-05 RX ORDER — ROSUVASTATIN CALCIUM 40 MG/1
40 TABLET, COATED ORAL DAILY
Qty: 90 TABLET | Refills: 1 | Status: SHIPPED | OUTPATIENT
Start: 2019-08-05 | End: 2019-08-05 | Stop reason: SDUPTHER

## 2019-08-05 RX ORDER — ROSUVASTATIN CALCIUM 40 MG/1
40 TABLET, COATED ORAL DAILY
Qty: 90 TABLET | Refills: 1 | Status: SHIPPED | OUTPATIENT
Start: 2019-08-05 | End: 2020-02-03 | Stop reason: SDUPTHER

## 2019-08-05 NOTE — ASSESSMENT & PLAN NOTE
On atorvastatin 80 LDL cholesterol 69 potential for drug interaction without high-dose statin will change to rosuvastatin 40 no other changes made

## 2019-08-05 NOTE — LETTER
August 5, 2019     French Garcia DO  [address]  [place] [address]    Patient: Maximus Daniel  YOB: 1940  Date of Visit: 8/5/2019      Dear Luis    Thank you for referring Maximus Daniel to me for evaluation. Below are my notes for this consultation.    If you have questions, please do not hesitate to call me. I look forward to following your patient along with you.         Sincerely,        Fernie Rodriguez MD        CC: No Recipients  Fernie Rodriguez MD  8/5/2019  1:58 PM  Sign at close encounter      Cardiology Note    French Garcia DO          Maximus Daniel is a 79 y.o. 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  Echo cardiogram today stable inferior apical akinesis mild aortic stenosis mean gradient 9 ejection fraction 50% moderate mitral regurgitation    He feels tired no chest pain  Lab work stools show significant hypothyroidism with a TSH of 16 but this is improved  Hemoglobin A1c is 11.4  He tells me he will be establishing care with endocrinology at [facility]        Patient Active Problem List    Diagnosis Date Noted   • Malignant neoplasm of sigmoid colon (CMS/[facility]) 11/06/2018   • Iron deficiency anemia 08/02/2018   • PAF (paroxysmal atrial fibrillation) (CMS/[facility]) ([facility]) 08/02/2018   • History of coronary artery bypass graft 06/11/2018   • Nonrheumatic aortic valve stenosis 06/11/2018   • Acute ST elevation myocardial infarction (STEMI) (CMS/[facility]) ([facility]) 05/19/2018   • Type 2 diabetes mellitus (CMS/[facility]) ([facility]) 05/18/2018   •  Incarcerated hernia 05/18/2018   • Hypothyroidism 05/18/2018   • Hyperlipidemia 05/18/2018   • Nephrolithiasis 05/18/2018       Allergy  Patient has no known allergies.    MED LIST     Current Outpatient Prescriptions   Medication Sig Dispense Refill   • aspirin 81 mg enteric coated tablet Take 81 mg by mouth daily.     • ferrous sulfate 325 mg (65 mg iron) tablet Take 65 mg by mouth daily with breakfast.     • insulin aspart U-100 (NovoLOG) 100 unit/mL injection Inject under the skin 3 (three) times a day before meals. Pt takes 12 units TID, and an additional 400units as directed      • insulin detemir (LEVEMIR FLEXPEN SUBQ) Inject under the skin. 50 units BID      • levothyroxine (SYNTHROID) 175 mcg tablet Take 1 tablet (175 mcg total) by mouth daily. (Patient taking differently: Take 225 mcg by mouth daily.  ) 90 tablet 3   • metoprolol succinate XL (TOPROL-XL) 25 mg 24 hr tablet Take 1 tablet (25 mg total) by mouth daily. 90 tablet 6   • insulin glargine (LANTUS SOLOSTAR) 100 unit/mL (3 mL) subcutaneous pen Inject under the skin nightly.     • insulin lispro (HumaLOG U-100 Insulin) 100 unit/mL vial Inject under the skin once daily.     • rosuvastatin (CRESTOR) 40 mg tablet Take 1 tablet (40 mg total) by mouth daily. 90 tablet 1     No current facility-administered medications for this visit.         Review of Systems   Constitution: Positive for malaise/fatigue. Negative for weight gain and weight loss.   HENT: Negative for hearing loss and hoarse voice.    Eyes: Negative for visual disturbance.   Cardiovascular: Negative for chest pain, claudication, cyanosis, dyspnea on exertion, irregular heartbeat, leg swelling, near-syncope, orthopnea, palpitations, paroxysmal nocturnal dyspnea and syncope.   Respiratory: Negative for cough, hemoptysis, shortness of breath, sleep disturbances due to breathing, snoring, sputum production and wheezing.    Endocrine: Negative for cold intolerance and heat intolerance.  "  Hematologic/Lymphatic: Negative.  Negative for bleeding problem. Does not bruise/bleed easily.   Skin: Negative.  Negative for rash.   Musculoskeletal: Positive for joint pain. Negative for arthritis, falls, muscle cramps and myalgias.   Gastrointestinal: Negative for abdominal pain, anorexia, change in bowel habit, constipation, diarrhea, dysphagia, heartburn, jaundice and nausea.   Genitourinary: Negative for frequency and nocturia.   Neurological: Negative for dizziness, focal weakness, headaches, light-headedness, numbness, tremors and vertigo.   Psychiatric/Behavioral: Negative for memory loss. The patient does not have insomnia and is not nervous/anxious.    Allergic/Immunologic: Negative for hives.       Labs 7/19                                      Lab Results   Component Value Date    WBC 7.81 05/26/2018    HGB 9.8 (L) 05/26/2018    HCT 31.3 (L) 05/26/2018     (H) 05/26/2018    CHOL 152 05/19/2018    TRIG 101 05/19/2018    HDL 29 (L) 05/19/2018    LDLCALC 103 (H) 05/19/2018    ALT 53 05/18/2018     (H) 05/18/2018     05/26/2018    K 3.8 05/26/2018    CL 99 05/26/2018    CREATININE 1.3 05/26/2018    BUN 18 05/26/2018    CO2 30 05/26/2018    INR 1.6 05/20/2018    HGBA1C 10.5 (H) 05/19/2018       Lab Results   Component Value Date    GLUCOSE 94 05/26/2018    CALCIUM 8.2 (L) 05/26/2018     05/26/2018    K 3.8 05/26/2018    CO2 30 05/26/2018    CL 99 05/26/2018    BUN 18 05/26/2018    CREATININE 1.3 05/26/2018           LABS January 2019    Cholesterol 136  HDL 43 triglycerides 113 LDL 73  Glucose 211  Creatinine 1.3  Potassium 4.4  Hemoglobin A1c 10  TSH 50        Objective   Vitals:    08/05/19 1302   BP: 122/74   Pulse: 60   SpO2: 96%   Weight: 86.2 kg (190 lb)   Height: 1.6 m (5' 3\")     Physical Exam   Constitutional: He is oriented to person, place, and time. He appears well-developed and well-nourished. He is active.  Non-toxic appearance. He does not have a sickly appearance. " He does not appear ill. No distress. He is not intubated.   HENT:   Head: Normocephalic. Not microcephalic. Head is without raccoon's eyes, without abrasion and without contusion.   Nose: Nose normal.   Eyes: EOM are normal. Left eye exhibits no discharge and no exudate. Right conjunctiva is not injected. Left conjunctiva is not injected. Left conjunctiva has no hemorrhage. No scleral icterus. Pupils are equal.   Neck: Normal range of motion. Neck supple. Normal carotid pulses and no hepatojugular reflux present. Carotid bruit is not present.   Cardiovascular: Normal rate, regular rhythm, normal heart sounds, intact distal pulses and normal pulses.  PMI is not displaced.  Exam reveals no gallop and no friction rub.    No murmur heard.  1/6 systolic murmur   Pulmonary/Chest: Effort normal and breath sounds normal. No stridor. No apnea, no tachypnea and no bradypnea. He is not intubated. No respiratory distress. He has no wheezes. He has no rales. He exhibits no tenderness.   Abdominal: Soft. Normal appearance, normal aorta and bowel sounds are normal. He exhibits no distension. There is no tenderness.   Musculoskeletal: Normal range of motion. He exhibits no edema or deformity.   Neurological: He is alert and oriented to person, place, and time.   Skin: No abrasion, no bruising, no ecchymosis and no rash noted. He is not diaphoretic. No erythema. No pallor.   Psychiatric: He has a normal mood and affect. His mood appears not anxious. His affect is not angry, not blunt, not labile and not inappropriate. His speech is not slurred. He is not agitated, not aggressive, not withdrawn and not combative. He does not exhibit a depressed mood. He is communicative.       Cardiac Procedures  Cardiac Procedures  Cardiac Procedures        CATH  5/18 90% PROX LAD MID 70%, Om1 80%, INF BRANCH 95% RCA NON OBSTRUCTIVE DISEASE        CV SURGERY     CABG x 4 5/18 AFTER NONSTEM limA LAD v TO DIAG, v Om1, v Om1   PERIO  AFIB        ELECTROPHYSIOLOGY       paf perio op cabg 5/18     ECHO       July 2019 ef 50% inf apical akinetic ef 50% mild mr mild as mean gradient 9    February 2019 infer lateral hypokinetic ejection fraction 45% mild left ear mild MR mild aortic stenosis peak gradient 2 m/s       EKG  nsr on spec st seg inf lat t wave inversions flattening     EKGsinus alysia 54 non spec st flattening ant lat t wave inversions no change  EKG    Assessment/Plan:  Acute ST elevation myocardial infarction (STEMI) (CMS/[facility]) ([facility])  May 2018 acute inferior infarction  Bypass surgery left internal mammary LAD vein to diagonal vein OM1 vein OM 2  He had perioperative atrial fibrillation  Echo today stable regional wall motion abnormality ejection fraction 50% mild aortic stenosis mean gradient 9  We will order pharmacologic nuclear stress test at next visit    Nonrheumatic aortic valve stenosis  With echo today inferior apical akinesis EF 50% peak gradient 2.2 m/s mean gradient 9 across aortic valve mild to moderate mitral regurgitation    Hyperlipidemia  On atorvastatin 80 LDL cholesterol 69 potential for drug interaction without high-dose statin will change to rosuvastatin 40 no other changes made    History of coronary artery bypass graft  As above May 2018 left internal mammary LAD, vein diagonal, vein OM1, vein OM 2 acute closure OM 2 with myocardial infarction    Hypothyroidism  Moving in the right direction but still has elevated TSH of 11 have been 50 in the past, following up with endocrinology at [place]    Type 2 diabetes mellitus (CMS/[facility]) ([facility])  On insulin hemoglobin A1c 11.4 again will establish care with endocrinology pain at [facility]    Malignant neoplasm of sigmoid colon (CMS/[facility])  [facility] September 2018    PAF (paroxysmal atrial fibrillation) (CMS/[facility]) ([facility])  Brief episode perioperatively with bypass surgery May 2018 no clinical recurrence         Main lab work abnormalities are significant  hypothyroidism hyperglycemia  He is going to establish care with endocrinology at [facility]  Cardiac wise things are stable echo is unchanged mild aortic stenosis and regional wall motion abnormality compatible with his old myocardial infarction  The only change I made was going from atorvastatin 80 to rosuvastatin 40 less drug drug interactions with this medication  I will see him in 6 months with lab work  We will probably order pharmacologic nuclear stress test at that visit    Thank you for allowing me to participate in the care of this patient.  I hope this information is helpful.  [[''    This letter was generated using speech recognition software.  Please excuse any typographical errors.  Fernie Rodriguez MD [facility]   8/5/2019  Cc French Garcia, DO

## 2019-08-05 NOTE — ASSESSMENT & PLAN NOTE
May 2018 acute inferior infarction  Bypass surgery left internal mammary LAD vein to diagonal vein OM1 vein OM 2  He had perioperative atrial fibrillation  Echo today stable regional wall motion abnormality ejection fraction 50% mild aortic stenosis mean gradient 9  We will order pharmacologic nuclear stress test at next visit

## 2019-08-05 NOTE — ASSESSMENT & PLAN NOTE
With echo today inferior apical akinesis EF 50% peak gradient 2.2 m/s mean gradient 9 across aortic valve mild to moderate mitral regurgitation

## 2019-08-05 NOTE — ASSESSMENT & PLAN NOTE
Moving in the right direction but still has elevated TSH of 11 have been 50 in the past, [facility]

## 2019-08-05 NOTE — ASSESSMENT & PLAN NOTE
As above May 2018 left internal mammary LAD, vein diagonal, vein OM1, vein OM 2 acute closure OM 2 with myocardial infarction

## 2019-09-13 ENCOUNTER — TRANSCRIBE ORDERS (OUTPATIENT)
Dept: LAB | Facility: HOSPITAL | Age: 79
End: 2019-09-13

## 2019-09-13 ENCOUNTER — APPOINTMENT (OUTPATIENT)
Dept: LAB | Facility: HOSPITAL | Age: 79
End: 2019-09-13
Attending: INTERNAL MEDICINE
Payer: MEDICARE

## 2019-09-13 DIAGNOSIS — E06.3 AUTOIMMUNE THYROIDITIS: ICD-10-CM

## 2019-09-13 DIAGNOSIS — E06.3 AUTOIMMUNE THYROIDITIS: Primary | ICD-10-CM

## 2019-09-13 LAB
T4 FREE SERPL-MCNC: 1.02 NG/DL (ref 0.58–1.64)
TSH SERPL DL<=0.05 MIU/L-ACNC: 15.64 MIU/L (ref 0.34–5.6)

## 2019-09-13 PROCEDURE — 84439 ASSAY OF FREE THYROXINE: CPT

## 2019-09-13 PROCEDURE — 84443 ASSAY THYROID STIM HORMONE: CPT

## 2019-09-13 PROCEDURE — 36415 COLL VENOUS BLD VENIPUNCTURE: CPT

## 2019-11-08 ENCOUNTER — TELEPHONE (OUTPATIENT)
Dept: SCHEDULING | Facility: CLINIC | Age: 79
End: 2019-11-08

## 2019-11-08 NOTE — TELEPHONE ENCOUNTER
Pt's son, Davis calling to request a statement for pt to get home care. Pt's insurance wont approve of home care unless a doctor letter is provided. Pt is unable to function normal ADL without assistant. Davis can be reach at [phone]

## 2019-11-08 NOTE — TELEPHONE ENCOUNTER
Spoke to pt's son, per Dr Rodriguez the letter should be written by PCP. No further actions needed.

## 2020-01-28 PROBLEM — E11.8 CONTROLLED TYPE 2 DIABETES MELLITUS WITH COMPLICATION, WITHOUT LONG-TERM CURRENT USE OF INSULIN (CMS/HCC): Status: ACTIVE | Noted: 2018-05-18

## 2020-01-28 PROBLEM — E11.9 TYPE 2 DIABETES MELLITUS (CMS/HCC): Status: RESOLVED | Noted: 2018-05-18 | Resolved: 2020-01-28

## 2020-01-28 ASSESSMENT — ENCOUNTER SYMPTOMS
HEARTBURN: 0
FREQUENCY: 0
BRUISES/BLEEDS EASILY: 0
COUGH: 0
CONSTIPATION: 0
SNORING: 0
HOARSE VOICE: 0
JAUNDICE: 0
FALLS: 0
MUSCLE CRAMPS: 0
NAUSEA: 0
NUMBNESS: 0
SPUTUM PRODUCTION: 0
TREMORS: 0
WEIGHT LOSS: 0
FOCAL WEAKNESS: 0
HEADACHES: 0
MYALGIAS: 0
ABDOMINAL PAIN: 0
ORTHOPNEA: 0
HEMATOLOGIC/LYMPHATIC NEGATIVE: 1
IRREGULAR HEARTBEAT: 0
PALPITATIONS: 0
INSOMNIA: 0
DIZZINESS: 0
HEMOPTYSIS: 0
SLEEP DISTURBANCES DUE TO BREATHING: 0
NERVOUS/ANXIOUS: 0
SYNCOPE: 0
PND: 0
LIGHT-HEADEDNESS: 0
DIARRHEA: 0
WHEEZING: 0
CLAUDICATION: 0
MEMORY LOSS: 0
VERTIGO: 0
WEIGHT GAIN: 0
SHORTNESS OF BREATH: 0
CHANGE IN BOWEL HABIT: 0
ANOREXIA: 0
NEAR-SYNCOPE: 0

## 2020-01-28 NOTE — PROGRESS NOTES
Cardiology Note    French Garcia DO William H Fredy is a 79 y.o. male 1940     He returns for follow-up and review his lab work  In May 2018 he had acute inferior infarction  He has substernal chest pain with his heart attack nothing since  He does feel tired when he walks  Underwent bypass surgery with left internal mammary LAD vein to diagonal vein to marginal vein to second marginal    Mild aortic stenosis   paroxysmal A. fib   echocardiogram done February 2019 EF 50% inferior wall akinetic  Mild aortic stenosis with peak gradient 2 m/s    He is treated for hyperlipidemia hypothyroidism and diabetes    History of sigmoid cancer resected 2018    He has been seen by endocrinology since my last visit lab work much better TSH is now normalized on Synthroid and hemoglobin A1c went from 11-8.8      Labs remarkable December 2019 hemoglobin A1c 8.8 and   increasing creatinine 1.34 from 1.2  Hemoglobin 17  Cholesterol LDL 60  HDL 33 triglycerides 150 total cholesterol 120  He had markedly elevated TSH repeat level normal              v        Patient Active Problem List    Diagnosis Date Noted   • CRI (chronic renal insufficiency) 02/03/2020   • Anemia 02/03/2020   • Nicotine abuse 02/03/2020   • Malignant neoplasm of sigmoid colon (CMS/[facility]) 11/06/2018   • Iron deficiency anemia 08/02/2018   • PAF (paroxysmal atrial fibrillation) (CMS/[facility]) 08/02/2018   • History of coronary artery bypass graft 06/11/2018   • Nonrheumatic aortic valve stenosis 06/11/2018   • Acute ST elevation myocardial infarction (STEMI) (CMS/[facility]) 05/19/2018   • Controlled type 2 diabetes mellitus with complication, without long-term current use of insulin (CMS/[facility]) 05/18/2018   • Incarcerated hernia 05/18/2018   • Hypothyroidism 05/18/2018   • Hyperlipidemia 05/18/2018   • Nephrolithiasis 05/18/2018       Allergy  Patient has no known allergies.    MED LIST     Current Outpatient Medications   Medication Sig Dispense Refill   •  aspirin 81 mg enteric coated tablet Take 81 mg by mouth daily.     • insulin aspart U-100 (NovoLOG) 100 unit/mL injection Inject under the skin 3 (three) times a day before meals. Pt takes 12 units TID, and an additional 400units as directed      • levothyroxine (SYNTHROID) 200 mcg tablet Take 200 mcg by mouth See admin instr. Along with 50mcg     • levothyroxine sodium (TIROSINT) 50 mcg capsule Take 50 mcg by mouth See admin instr. Along with 200mcg     • metoprolol succinate XL (TOPROL-XL) 25 mg 24 hr tablet Take 1 tablet (25 mg total) by mouth daily. 90 tablet 6   • ferrous sulfate 325 mg (65 mg iron) tablet Take 65 mg by mouth daily with breakfast.     • insulin detemir (LEVEMIR FLEXPEN SUBQ) Inject under the skin. 50 units BID      • insulin glargine (LANTUS SOLOSTAR) 100 unit/mL (3 mL) subcutaneous pen Inject under the skin nightly.     • insulin lispro (HumaLOG U-100 Insulin) 100 unit/mL vial Inject under the skin once daily.     • levothyroxine (SYNTHROID) 175 mcg tablet Take 1 tablet (175 mcg total) by mouth daily. (Patient not taking: Reported on 2/3/2020 ) 90 tablet 3   • rosuvastatin (CRESTOR) 40 mg tablet Take 1 tablet (40 mg total) by mouth daily. 90 tablet 3     No current facility-administered medications for this visit.         Review of Systems   Constitution: Negative for malaise/fatigue, weight gain and weight loss.   HENT: Negative for hearing loss and hoarse voice.    Eyes: Negative for visual disturbance.   Cardiovascular: Positive for dyspnea on exertion. Negative for chest pain, claudication, cyanosis, irregular heartbeat, leg swelling, near-syncope, orthopnea, palpitations, paroxysmal nocturnal dyspnea and syncope.   Respiratory: Negative for cough, hemoptysis, shortness of breath, sleep disturbances due to breathing, snoring, sputum production and wheezing.    Endocrine: Negative for cold intolerance and heat intolerance.   Hematologic/Lymphatic: Negative.  Negative for bleeding problem.  "Does not bruise/bleed easily.   Skin: Negative.  Negative for rash.   Musculoskeletal: Positive for joint pain. Negative for arthritis, falls, muscle cramps and myalgias.   Gastrointestinal: Negative for abdominal pain, anorexia, change in bowel habit, constipation, diarrhea, dysphagia, heartburn, jaundice and nausea.   Genitourinary: Negative for frequency and nocturia.   Neurological: Negative for dizziness, focal weakness, headaches, light-headedness, numbness, tremors and vertigo.   Psychiatric/Behavioral: Negative for memory loss. The patient does not have insomnia and is not nervous/anxious.    Allergic/Immunologic: Negative for hives.       Labs dec 2019  Hemoglobin A1c 8.8  Cholesterol LDL 68  Creatinine 1.34 potassium 5 hemoglobin 17  Thyroid studies normal                                                    Objective   Vitals:    02/03/20 1048   BP: 112/72   Pulse: 70   SpO2: 94%   Weight: 104 kg (230 lb)   Height: 1.6 m (5' 3\")     Physical Exam   Constitutional: He is oriented to person, place, and time. He appears well-developed and well-nourished. He is active.  Non-toxic appearance. He does not have a sickly appearance. He does not appear ill. No distress. He is not intubated.   HENT:   Head: Normocephalic. Not microcephalic. Head is without raccoon's eyes, without abrasion and without contusion.   Nose: Nose normal.   Eyes: EOM are normal. Left eye exhibits no discharge and no exudate. Right conjunctiva is not injected. Left conjunctiva is not injected. Left conjunctiva has no hemorrhage. No scleral icterus. Pupils are equal.   Neck: Normal range of motion. Neck supple. Normal carotid pulses and no hepatojugular reflux present. Carotid bruit is not present.   Cardiovascular: Normal rate, regular rhythm, normal heart sounds, intact distal pulses and normal pulses. PMI is not displaced. Exam reveals no gallop and no friction rub.   No murmur heard.  1/6 systolic murmur   Pulmonary/Chest: Effort normal " and breath sounds normal. No stridor. No apnea, no tachypnea and no bradypnea. He is not intubated. No respiratory distress. He has no wheezes. He has no rales. He exhibits no tenderness.   Abdominal: Soft. Normal appearance, normal aorta and bowel sounds are normal. He exhibits no distension. There is no tenderness.   Musculoskeletal: Normal range of motion. He exhibits no edema or deformity.   Neurological: He is alert and oriented to person, place, and time.   Skin: No abrasion, no bruising, no ecchymosis and no rash noted. He is not diaphoretic. No erythema. No pallor.   Psychiatric: He has a normal mood and affect. His mood appears not anxious. His affect is not angry, not blunt, not labile and not inappropriate. His speech is not slurred. He is not agitated, not aggressive, not withdrawn and not combative. He does not exhibit a depressed mood. He is communicative.       Cardiac Procedures  Cardiac Procedures  Cardiac Procedures        CATH  5/18/20 90% PROX LAD MID 70%, Om1 80%, INF BRANCH 95% RCA NON OBSTRUCTIVE DISEASE    STRESS  Pharm nuc 2/2020 inf lat scar per inf ischem mild 45%              CV SURGERY     CABG x 4 5/18 AFTER NONSTEM limA LAD v TO DIAG, v Om1, v Om1   PERIO AFIB        ELECTROPHYSIOLOGY       paf perio op cabg 5/18     ECHO       July 2019 ef 50% inf apical akinetic ef 50% mild mr mild as mean gradient 9    august 2019 infer lateral hypokinetic ejection fraction 45% mild left ear mild MR mild aortic stenosis peak gradient 2 m/s       EKG  nsr on spec st seg inf lat t wave inversions flatteni t wave inversions no changeng     EKGsinus alysia 54 non spec st flattening ant lat t wave inversions no change  EKG    Assessment/Plan:  History of coronary artery bypass graft  May 2018 presented with typical chest pain had inferior infarction with bypass surgery  Left internal mammary LAD, vein diagonal vein to marginal vein to marginal 2  A. Fib  Last echocardiogram August 2019 inferolateral  hypokinetic ejection fraction 45% mild aortic stenosis peak gradient 2 m/s echo with next visit pharmacologic nuclear stress test prior to visit he feels tired with ambulation no chest pain    PAF (paroxysmal atrial fibrillation) (CMS/HCC)  Current perioperative 2018 no clinical recurrence    Controlled type 2 diabetes mellitus with complication, without long-term current use of insulin (CMS/HCC)  Now following with endocrinology Dr. Swenson TSH has normalized was markedly increased in the past and A1c much improved    Hyperlipidemia  On rosuvastatin 40 LDL cholesterol at goal 60    Hypothyroidism  Levels now normal TSH have been markedly elevated 50 follows with Dr. Swenson    Malignant neoplasm of sigmoid colon (CMS/HCC)  Resected  [facility] September 2018         I will see him back in 6 months pharmacologic nuclear stress prior to visit echo at visit no change in cardiac medications  We will follow-up with you to see if the screening CAT scan for his cigarette smoking I am not sure what the criteria is I think he have to be under 75 but he will check with you          This letter was generated using speech recognition software.  Please excuse any typographical errors.  Fernie Rodriguez MD [facility]   2/3/2020  Cc French Garica,

## 2020-02-03 ENCOUNTER — OFFICE VISIT (OUTPATIENT)
Dept: CARDIOLOGY | Facility: CLINIC | Age: 80
End: 2020-02-03
Payer: MEDICARE

## 2020-02-03 VITALS
HEART RATE: 70 BPM | BODY MASS INDEX: 40.75 KG/M2 | OXYGEN SATURATION: 94 % | SYSTOLIC BLOOD PRESSURE: 112 MMHG | HEIGHT: 63 IN | DIASTOLIC BLOOD PRESSURE: 72 MMHG | WEIGHT: 230 LBS

## 2020-02-03 DIAGNOSIS — Z72.0 NICOTINE ABUSE: ICD-10-CM

## 2020-02-03 DIAGNOSIS — E11.8 CONTROLLED TYPE 2 DIABETES MELLITUS WITH COMPLICATION, WITHOUT LONG-TERM CURRENT USE OF INSULIN (CMS/HCC): ICD-10-CM

## 2020-02-03 DIAGNOSIS — I35.0 NONRHEUMATIC AORTIC VALVE STENOSIS: ICD-10-CM

## 2020-02-03 DIAGNOSIS — I21.21 ACUTE ST ELEVATION MYOCARDIAL INFARCTION (STEMI) INVOLVING LEFT CIRCUMFLEX CORONARY ARTERY (CMS/HCC): Primary | ICD-10-CM

## 2020-02-03 DIAGNOSIS — E03.9 HYPOTHYROIDISM, UNSPECIFIED TYPE: ICD-10-CM

## 2020-02-03 DIAGNOSIS — N18.9 CHRONIC RENAL IMPAIRMENT, UNSPECIFIED CKD STAGE: ICD-10-CM

## 2020-02-03 DIAGNOSIS — C18.7 MALIGNANT NEOPLASM OF SIGMOID COLON (CMS/HCC): ICD-10-CM

## 2020-02-03 DIAGNOSIS — E78.5 HYPERLIPIDEMIA, UNSPECIFIED HYPERLIPIDEMIA TYPE: ICD-10-CM

## 2020-02-03 DIAGNOSIS — N20.0 NEPHROLITHIASIS: ICD-10-CM

## 2020-02-03 PROBLEM — D64.9 ANEMIA: Status: ACTIVE | Noted: 2020-02-03

## 2020-02-03 PROCEDURE — 99214 OFFICE O/P EST MOD 30 MIN: CPT | Performed by: INTERNAL MEDICINE

## 2020-02-03 PROCEDURE — 93000 ELECTROCARDIOGRAM COMPLETE: CPT | Performed by: INTERNAL MEDICINE

## 2020-02-03 RX ORDER — LEVOTHYROXINE SODIUM 200 UG/1
200 TABLET ORAL SEE ADMIN INSTRUCTIONS
COMMUNITY

## 2020-02-03 RX ORDER — ROSUVASTATIN CALCIUM 40 MG/1
40 TABLET, COATED ORAL DAILY
Qty: 90 TABLET | Refills: 3 | Status: SHIPPED | OUTPATIENT
Start: 2020-02-03 | End: 2020-08-10 | Stop reason: SDUPTHER

## 2020-02-03 RX ORDER — LEVOTHYROXINE SODIUM 50 UG/1
50 CAPSULE ORAL SEE ADMIN INSTRUCTIONS
COMMUNITY

## 2020-02-03 RX ORDER — ROSUVASTATIN CALCIUM 40 MG/1
40 TABLET, COATED ORAL DAILY
Qty: 90 TABLET | Refills: 3 | Status: SHIPPED | OUTPATIENT
Start: 2020-02-03 | End: 2020-02-03 | Stop reason: SDUPTHER

## 2020-02-03 ASSESSMENT — ENCOUNTER SYMPTOMS: DYSPNEA ON EXERTION: 1

## 2020-02-03 NOTE — LETTER
February 3, 2020     French Garcia DO  [address] [facility] [place] [address]    Patient: Maximus Daniel  YOB: 1940  Date of Visit: 2/3/2020      Dear Dr. Garcia:    Thank you for referring Maximus Daniel to me for evaluation. Below are my notes for this consultation.    If you have questions, please do not hesitate to call me. I look forward to following your patient along with you.         Sincerely,        Fernie Rodriguez MD        CC: MD Michael Stevens, Fernie MEI MD  2/3/2020 11:37 AM  Sign at close encounter      Cardiology Note    French Garcia DO          Maximus Daniel is a 79 y.o. male 1940     He returns for follow-up and review his lab work  In May 2018 he had acute inferior infarction  He has substernal chest pain with his heart attack nothing since  He does feel tired when he walks  Underwent bypass surgery with left internal mammary LAD vein to diagonal vein to marginal vein to second marginal    Mild aortic stenosis   paroxysmal A. fib   echocardiogram done February 2019 EF 50% inferior wall akinetic  Mild aortic stenosis with peak gradient 2 m/s    He is treated for hyperlipidemia hypothyroidism and diabetes    History of sigmoid cancer resected 2018    He has been seen by endocrinology since my last visit lab work much better TSH is now normalized on Synthroid and hemoglobin A1c went from 11-8.8      Labs remarkable December 2019 hemoglobin A1c 8.8 and   increasing creatinine 1.34 from 1.2  Hemoglobin 17  Cholesterol LDL 60  HDL 33 triglycerides 150 total cholesterol 120  He had markedly elevated TSH repeat level normal              v        Patient Active Problem List    Diagnosis Date Noted   • CRI (chronic renal insufficiency) 02/03/2020   • Anemia 02/03/2020   • Nicotine abuse 02/03/2020   • Malignant neoplasm of sigmoid colon (CMS/HCC) 11/06/2018   • Iron deficiency anemia 08/02/2018   • PAF (paroxysmal atrial fibrillation) (CMS/[facility])  08/02/2018   • History of coronary artery bypass graft 06/11/2018   • Nonrheumatic aortic valve stenosis 06/11/2018   • Acute ST elevation myocardial infarction (STEMI) (CMS/[facility]) 05/19/2018   • Controlled type 2 diabetes mellitus with complication, without long-term current use of insulin (CMS/[facility]) 05/18/2018   • Incarcerated hernia 05/18/2018   • Hypothyroidism 05/18/2018   • Hyperlipidemia 05/18/2018   • Nephrolithiasis 05/18/2018       Allergy  Patient has no known allergies.    MED LIST     Current Outpatient Medications   Medication Sig Dispense Refill   • aspirin 81 mg enteric coated tablet Take 81 mg by mouth daily.     • insulin aspart U-100 (NovoLOG) 100 unit/mL injection Inject under the skin 3 (three) times a day before meals. Pt takes 12 units TID, and an additional 400units as directed      • levothyroxine (SYNTHROID) 200 mcg tablet Take 200 mcg by mouth See admin instr. Along with 50mcg     • levothyroxine sodium (TIROSINT) 50 mcg capsule Take 50 mcg by mouth See admin instr. Along with 200mcg     • metoprolol succinate XL (TOPROL-XL) 25 mg 24 hr tablet Take 1 tablet (25 mg total) by mouth daily. 90 tablet 6   • ferrous sulfate 325 mg (65 mg iron) tablet Take 65 mg by mouth daily with breakfast.     • insulin detemir (LEVEMIR FLEXPEN SUBQ) Inject under the skin. 50 units BID      • insulin glargine (LANTUS SOLOSTAR) 100 unit/mL (3 mL) subcutaneous pen Inject under the skin nightly.     • insulin lispro (HumaLOG U-100 Insulin) 100 unit/mL vial Inject under the skin once daily.     • levothyroxine (SYNTHROID) 175 mcg tablet Take 1 tablet (175 mcg total) by mouth daily. (Patient not taking: Reported on 2/3/2020 ) 90 tablet 3   • rosuvastatin (CRESTOR) 40 mg tablet Take 1 tablet (40 mg total) by mouth daily. 90 tablet 3     No current facility-administered medications for this visit.         Review of Systems   Constitution: Negative for malaise/fatigue, weight gain and weight loss.   HENT: Negative for  "hearing loss and hoarse voice.    Eyes: Negative for visual disturbance.   Cardiovascular: Positive for dyspnea on exertion. Negative for chest pain, claudication, cyanosis, irregular heartbeat, leg swelling, near-syncope, orthopnea, palpitations, paroxysmal nocturnal dyspnea and syncope.   Respiratory: Negative for cough, hemoptysis, shortness of breath, sleep disturbances due to breathing, snoring, sputum production and wheezing.    Endocrine: Negative for cold intolerance and heat intolerance.   Hematologic/Lymphatic: Negative.  Negative for bleeding problem. Does not bruise/bleed easily.   Skin: Negative.  Negative for rash.   Musculoskeletal: Positive for joint pain. Negative for arthritis, falls, muscle cramps and myalgias.   Gastrointestinal: Negative for abdominal pain, anorexia, change in bowel habit, constipation, diarrhea, dysphagia, heartburn, jaundice and nausea.   Genitourinary: Negative for frequency and nocturia.   Neurological: Negative for dizziness, focal weakness, headaches, light-headedness, numbness, tremors and vertigo.   Psychiatric/Behavioral: Negative for memory loss. The patient does not have insomnia and is not nervous/anxious.    Allergic/Immunologic: Negative for hives.       Labs dec 2019  Hemoglobin A1c 8.8  Cholesterol LDL 68  Creatinine 1.34 potassium 5 hemoglobin 17  Thyroid studies normal                                                    Objective   Vitals:    02/03/20 1048   BP: 112/72   Pulse: 70   SpO2: 94%   Weight: 104 kg (230 lb)   Height: 1.6 m (5' 3\")     Physical Exam   Constitutional: He is oriented to person, place, and time. He appears well-developed and well-nourished. He is active.  Non-toxic appearance. He does not have a sickly appearance. He does not appear ill. No distress. He is not intubated.   HENT:   Head: Normocephalic. Not microcephalic. Head is without raccoon's eyes, without abrasion and without contusion.   Nose: Nose normal.   Eyes: EOM are normal. " Left eye exhibits no discharge and no exudate. Right conjunctiva is not injected. Left conjunctiva is not injected. Left conjunctiva has no hemorrhage. No scleral icterus. Pupils are equal.   Neck: Normal range of motion. Neck supple. Normal carotid pulses and no hepatojugular reflux present. Carotid bruit is not present.   Cardiovascular: Normal rate, regular rhythm, normal heart sounds, intact distal pulses and normal pulses. PMI is not displaced. Exam reveals no gallop and no friction rub.   No murmur heard.  1/6 systolic murmur   Pulmonary/Chest: Effort normal and breath sounds normal. No stridor. No apnea, no tachypnea and no bradypnea. He is not intubated. No respiratory distress. He has no wheezes. He has no rales. He exhibits no tenderness.   Abdominal: Soft. Normal appearance, normal aorta and bowel sounds are normal. He exhibits no distension. There is no tenderness.   Musculoskeletal: Normal range of motion. He exhibits no edema or deformity.   Neurological: He is alert and oriented to person, place, and time.   Skin: No abrasion, no bruising, no ecchymosis and no rash noted. He is not diaphoretic. No erythema. No pallor.   Psychiatric: He has a normal mood and affect. His mood appears not anxious. His affect is not angry, not blunt, not labile and not inappropriate. His speech is not slurred. He is not agitated, not aggressive, not withdrawn and not combative. He does not exhibit a depressed mood. He is communicative.       Cardiac Procedures  Cardiac Procedures  Cardiac Procedures        CATH  5/18/20 90% PROX LAD MID 70%, Om1 80%, INF BRANCH 95% RCA NON OBSTRUCTIVE DISEASE        CV SURGERY     CABG x 4 5/18 AFTER NONSTEM limA LAD v TO DIAG, v Om1, v Om1   PERIO AFIB        ELECTROPHYSIOLOGY       paf perio op cabg 5/18     ECHO       July 2019 ef 50% inf apical akinetic ef 50% mild mr mild as mean gradient 9 august 2019 infer lateral hypokinetic ejection fraction 45% mild left ear mild MR mild  aortic stenosis peak gradient 2 m/s       EKG  nsr on spec st seg inf lat t wave inversions flatteni t wave inversions no changeng     EKGsinus alysia 54 non spec st flattening ant lat t wave inversions no change  EKG    Assessment/Plan:  History of coronary artery bypass graft  May 2018 presented with typical chest pain had inferior infarction with bypass surgery  Left internal mammary LAD, vein diagonal vein to marginal vein to marginal 2  A. Fib  Last echocardiogram August 2019 inferolateral hypokinetic ejection fraction 45% mild aortic stenosis peak gradient 2 m/s echo with next visit pharmacologic nuclear stress test prior to visit he feels tired with ambulation no chest pain    PAF (paroxysmal atrial fibrillation) (CMS/HCC)  Current perioperative 2018 no clinical recurrence    Controlled type 2 diabetes mellitus with complication, without long-term current use of insulin (CMS/HCC)  Now following with endocrinology Dr. Swenson TSH has normalized was markedly increased in the past and A1c much improved    Hyperlipidemia  On rosuvastatin 40 LDL cholesterol at goal 60    Hypothyroidism  Levels now normal TSH have been markedly elevated 50 follows with Dr. Swenson    Malignant neoplasm of sigmoid colon (CMS/HCC)  Resected  [facility] September 2018         I will see him back in 6 months pharmacologic nuclear stress prior to visit echo at visit no change in cardiac medications  We will follow-up with you to see if the screening CAT scan for his cigarette smoking I am not sure what the criteria is I think he have to be under 75 but he will check with you          This letter was generated using speech recognition software.  Please excuse any typographical errors.  Fernie Rodriguez MD [facility]   2/3/2020  French Han DO

## 2020-02-03 NOTE — ASSESSMENT & PLAN NOTE
May 2018 presented with typical chest pain had inferior infarction with bypass surgery  Left internal mammary LAD, vein diagonal vein to marginal vein to marginal 2  A. Fib  Last echocardiogram August 2019 inferolateral hypokinetic ejection fraction 45% mild aortic stenosis peak gradient 2 m/s echo with next visit pharmacologic nuclear stress test prior to visit he feels tired with ambulation no chest pain

## 2020-02-03 NOTE — ASSESSMENT & PLAN NOTE
Now following with endocrinology Dr. Swenson TSH has normalized was markedly increased in the past and A1c much improved

## 2020-04-23 ENCOUNTER — APPOINTMENT (RX ONLY)
Dept: URBAN - NONMETROPOLITAN AREA CLINIC 4 | Facility: CLINIC | Age: 80
Setting detail: DERMATOLOGY
End: 2020-04-23

## 2020-04-23 DIAGNOSIS — D485 NEOPLASM OF UNCERTAIN BEHAVIOR OF SKIN: ICD-10-CM

## 2020-04-23 PROBLEM — D48.5 NEOPLASM OF UNCERTAIN BEHAVIOR OF SKIN: Status: ACTIVE | Noted: 2020-04-23

## 2020-04-23 PROCEDURE — ? CONSENT FOR TELEMEDICINE VISIT OBTAINED

## 2020-04-23 PROCEDURE — 99212 OFFICE O/P EST SF 10 MIN: CPT | Mod: 95

## 2020-04-23 ASSESSMENT — PAIN INTENSITY VAS: HOW INTENSE IS YOUR PAIN 0 BEING NO PAIN, 10 BEING THE MOST SEVERE PAIN POSSIBLE?: NO PAIN

## 2020-04-23 ASSESSMENT — LOCATION DETAILED DESCRIPTION DERM: LOCATION DETAILED: RIGHT ANTIHELIX

## 2020-04-23 ASSESSMENT — LOCATION ZONE DERM: LOCATION ZONE: EAR

## 2020-04-23 ASSESSMENT — LOCATION SIMPLE DESCRIPTION DERM: LOCATION SIMPLE: RIGHT EAR

## 2020-06-24 ENCOUNTER — APPOINTMENT (RX ONLY)
Dept: URBAN - METROPOLITAN AREA CLINIC 374 | Facility: CLINIC | Age: 80
Setting detail: DERMATOLOGY
End: 2020-06-24

## 2020-06-24 ENCOUNTER — TELEPHONE (OUTPATIENT)
Dept: SCHEDULING | Facility: CLINIC | Age: 80
End: 2020-06-24

## 2020-06-24 DIAGNOSIS — D485 NEOPLASM OF UNCERTAIN BEHAVIOR OF SKIN: ICD-10-CM

## 2020-06-24 DIAGNOSIS — L57.0 ACTINIC KERATOSIS: ICD-10-CM

## 2020-06-24 PROBLEM — D48.5 NEOPLASM OF UNCERTAIN BEHAVIOR OF SKIN: Status: ACTIVE | Noted: 2020-06-24

## 2020-06-24 PROCEDURE — ? BIOPSY BY SHAVE METHOD

## 2020-06-24 PROCEDURE — 17000 DESTRUCT PREMALG LESION: CPT

## 2020-06-24 PROCEDURE — ? PHOTO-DOCUMENTATION

## 2020-06-24 PROCEDURE — ? LIQUID NITROGEN

## 2020-06-24 PROCEDURE — 69100 BIOPSY OF EXTERNAL EAR: CPT | Mod: 59

## 2020-06-24 PROCEDURE — ? COUNSELING

## 2020-06-24 ASSESSMENT — LOCATION ZONE DERM
LOCATION ZONE: HAND
LOCATION ZONE: EAR

## 2020-06-24 ASSESSMENT — LOCATION SIMPLE DESCRIPTION DERM
LOCATION SIMPLE: RIGHT EAR
LOCATION SIMPLE: LEFT HAND

## 2020-06-24 ASSESSMENT — LOCATION DETAILED DESCRIPTION DERM
LOCATION DETAILED: LEFT ULNAR DORSAL HAND
LOCATION DETAILED: RIGHT CRUS OF HELIX

## 2020-06-24 NOTE — PROCEDURE: BIOPSY BY SHAVE METHOD
Detail Level: Detailed
Depth Of Biopsy: dermis
Was A Bandage Applied: Yes
Size Of Lesion In Cm: 0.8
X Size Of Lesion In Cm: 0
Biopsy Type: H and E
Biopsy Method: Personna blade
Anesthesia Type: 1% lidocaine with epinephrine
Anesthesia Volume In Cc (Will Not Render If 0): 0.5
Hemostasis: Electrodesiccation
Wound Care: Petrolatum
Dressing: bandage
Destruction After The Procedure: No
Type Of Destruction Used: Curettage
Curettage Text: The wound bed was treated with curettage after the biopsy was performed.
Cryotherapy Text: The wound bed was treated with cryotherapy after the biopsy was performed.
Electrodesiccation Text: The wound bed was treated with electrodesiccation after the biopsy was performed.
Electrodesiccation And Curettage Text: The wound bed was treated with electrodesiccation and curettage after the biopsy was performed.
Silver Nitrate Text: The wound bed was treated with silver nitrate after the biopsy was performed.
Lab: 6
Lab Facility: 3
Consent: Written consent was obtained and risks were reviewed including but not limited to scarring, infection, bleeding, scabbing, incomplete removal, nerve damage and allergy to anesthesia.
Post-Care Instructions: I reviewed with the patient in detail post-care instructions. Patient is to keep the biopsy site dry overnight, and then apply bacitracin twice daily until healed. Patient may apply hydrogen peroxide soaks to remove any crusting.
Notification Instructions: Patient will be notified of biopsy results. However, patient instructed to call the office if not contacted within 2 weeks.
Billing Type: Third-Party Bill
Information: Selecting Yes will display possible errors in your note based on the variables you have selected. This validation is only offered as a suggestion for you. PLEASE NOTE THAT THE VALIDATION TEXT WILL BE REMOVED WHEN YOU FINALIZE YOUR NOTE. IF YOU WANT TO FAX A PRELIMINARY NOTE YOU WILL NEED TO TOGGLE THIS TO 'NO' IF YOU DO NOT WANT IT IN YOUR FAXED NOTE.

## 2020-06-24 NOTE — TELEPHONE ENCOUNTER
NPR per Plan     Medicare does not require precertification, and any indemnity or Security 65, or supplement insurance follows medicare guidelines.

## 2020-06-24 NOTE — TELEPHONE ENCOUNTER
Caller: Maximus Daniel, Son  Phone: [phone]  Regard: Precert    Pt son has order for Nuc Stress. Precert is needed     Insurance   Medicare [place] [employer] [facility]  [phone]

## 2020-06-24 NOTE — HPI: EVALUATION OF SKIN LESION(S)
What Type Of Note Output Would You Prefer (Optional)?: Standard Output
Hpi Title: Evaluation of a Skin Lesion
How Severe Are Your Spot(S)?: moderate
Have Your Spot(S) Been Treated In The Past?: has been treated
When Was It Treated?: 04/23/2020
5

## 2020-07-10 ENCOUNTER — TELEPHONE (OUTPATIENT)
Dept: SCHEDULING | Facility: CLINIC | Age: 80
End: 2020-07-10

## 2020-08-06 ENCOUNTER — TELEPHONE (OUTPATIENT)
Dept: CARDIOLOGY | Facility: CLINIC | Age: 80
End: 2020-08-06

## 2020-08-06 ASSESSMENT — ENCOUNTER SYMPTOMS
NUMBNESS: 0
TREMORS: 0
PND: 0
MUSCLE CRAMPS: 0
SPUTUM PRODUCTION: 0
COUGH: 0
PALPITATIONS: 0
FALLS: 0
DYSPNEA ON EXERTION: 1
ORTHOPNEA: 0
DIZZINESS: 0
HEARTBURN: 0
FOCAL WEAKNESS: 0
WEIGHT LOSS: 0
ANOREXIA: 0
INSOMNIA: 0
CONSTIPATION: 0
HOARSE VOICE: 0
JAUNDICE: 0
SHORTNESS OF BREATH: 0
VERTIGO: 0
BRUISES/BLEEDS EASILY: 0
HEMOPTYSIS: 0
SYNCOPE: 0
SNORING: 0
HEADACHES: 0
NEAR-SYNCOPE: 0
FREQUENCY: 0
NERVOUS/ANXIOUS: 0
MYALGIAS: 0
NAUSEA: 0
LIGHT-HEADEDNESS: 0
SLEEP DISTURBANCES DUE TO BREATHING: 0
HEMATOLOGIC/LYMPHATIC NEGATIVE: 1
CHANGE IN BOWEL HABIT: 0
ABDOMINAL PAIN: 0
CLAUDICATION: 0
DIARRHEA: 0
IRREGULAR HEARTBEAT: 0
WEIGHT GAIN: 0
MEMORY LOSS: 0
WHEEZING: 0

## 2020-08-06 NOTE — TELEPHONE ENCOUNTER
I called pt to confirm OV -- 8/10/2020 -- LMOM for pt to call me back.    I need to speak to pt when he/she calls back.    Nuc & Labs in chart

## 2020-08-06 NOTE — PROGRESS NOTES
Cardiology Note    French Garcia DO William BAKARI Daniel is a 80 y.o. 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  The time of surgery left internal mammary LAD vein to diagonal vein to marginal  Last echocardiogram August 2019 inferolateral hypokinesis with reduced ejection fraction 45%    He has mild aortic stenosis with peak gradient 2 m/s  He had a pharmacologic nuclear stress test February 2020 lateral scar with emma-infarction ischemia  He is here for diabetes hyperlipidemia hypothyroidism history of colon cancer resected 2018 he continues to smoke    No angina main complaint is some fatigue shortness of breath with exertion          Lab work June 2020    Cholesterol LDL 57 total cholesterol 113 HDL 36 glucose 195 triglycerides 115 creatinine 1.3    Potassium 4.6  Hemoglobin A1c 8.4  CBC normal                                   v        Patient Active Problem List    Diagnosis Date Noted   • CRI (chronic renal insufficiency) 02/03/2020   • Anemia 02/03/2020   • Nicotine abuse 02/03/2020   • Malignant neoplasm of sigmoid colon (CMS/[facility]) 11/06/2018   • Iron deficiency anemia 08/02/2018   • PAF (paroxysmal atrial fibrillation) (CMS/[facility]) 08/02/2018   • History of coronary artery bypass graft 06/11/2018   • Nonrheumatic aortic valve stenosis 06/11/2018   • Acute ST elevation myocardial infarction (STEMI) (CMS/[facility]) 05/19/2018   • Controlled type 2 diabetes mellitus with complication, without long-term current use of insulin (CMS/[facility]) 05/18/2018   • Incarcerated hernia 05/18/2018   • Hypothyroidism 05/18/2018   • Hyperlipidemia 05/18/2018   • Nephrolithiasis 05/18/2018       Allergy  Patient has no known allergies.    MED LIST     Current Outpatient Medications   Medication Sig Dispense Refill   • aspirin 81 mg enteric coated tablet Take 81 mg by mouth daily.     • docusate sodium (COLACE) 100 mg  capsule Take 100 mg by mouth once.     • insulin aspart U-100 (NovoLOG) 100 unit/mL injection Inject under the skin 3 (three) times a day before meals. Pt takes 12 units TID, and an additional 400units as directed      • levothyroxine (SYNTHROID) 200 mcg tablet Take 200 mcg by mouth See admin instr. Along with 50mcg     • levothyroxine sodium (TIROSINT) 50 mcg capsule Take 25 mcg by mouth See admin instr. Along with 200mcg       • metoprolol succinate XL (TOPROL-XL) 25 mg 24 hr tablet Take 1 tablet (25 mg total) by mouth daily. 90 tablet 3   • rosuvastatin (CRESTOR) 40 mg tablet Take 1 tablet (40 mg total) by mouth daily. 90 tablet 3   • TRESIBA FLEXTOUCH U-200 200 unit/mL (3 mL) pen        No current facility-administered medications for this visit.         Review of Systems   Constitution: Negative for malaise/fatigue, weight gain and weight loss.   HENT: Negative for hearing loss and hoarse voice.    Eyes: Negative for visual disturbance.   Cardiovascular: Positive for dyspnea on exertion. Negative for chest pain, claudication, cyanosis, irregular heartbeat, leg swelling, near-syncope, orthopnea, palpitations, paroxysmal nocturnal dyspnea and syncope.   Respiratory: Negative for cough, hemoptysis, shortness of breath, sleep disturbances due to breathing, snoring, sputum production and wheezing.    Endocrine: Negative for cold intolerance and heat intolerance.   Hematologic/Lymphatic: Negative.  Negative for bleeding problem. Does not bruise/bleed easily.   Skin: Negative.  Negative for rash.   Musculoskeletal: Positive for joint pain. Negative for arthritis, falls, muscle cramps and myalgias.   Gastrointestinal: Negative for abdominal pain, anorexia, change in bowel habit, constipation, diarrhea, dysphagia, heartburn, jaundice and nausea.   Genitourinary: Negative for frequency and nocturia.   Neurological: Negative for dizziness, focal weakness, headaches, light-headedness, numbness, tremors and vertigo.  "  Psychiatric/Behavioral: Negative for memory loss. The patient does not have insomnia and is not nervous/anxious.    Allergic/Immunologic: Negative for hives.       Labs dec 2019  Hemoglobin A1c 8.8  Cholesterol LDL 68  Creatinine 1.34 potassium 5 hemoglobin 17  Thyroid studies normal                                                    Objective   Vitals:    08/10/20 1058   BP: 128/70   BP Location: Left upper arm   Patient Position: Sitting   Pulse: 66   SpO2: 98%   Weight: 102 kg (224 lb)   Height: 1.6 m (5' 3\")     Physical Exam   Constitutional: He is oriented to person, place, and time. He appears well-developed and well-nourished. He is active.  Non-toxic appearance. He does not have a sickly appearance. He does not appear ill. No distress. He is not intubated.   HENT:   Head: Normocephalic. Not microcephalic. Head is without raccoon's eyes, without abrasion and without contusion.   Nose: Nose normal.   Eyes: EOM are normal. Left eye exhibits no discharge and no exudate. Right conjunctiva is not injected. Left conjunctiva is not injected. Left conjunctiva has no hemorrhage. No scleral icterus. Pupils are equal.   Neck: Normal range of motion. Neck supple. Normal carotid pulses and no hepatojugular reflux present. Carotid bruit is not present.   Cardiovascular: Normal rate, regular rhythm, normal heart sounds, intact distal pulses and normal pulses. PMI is not displaced. Exam reveals no gallop and no friction rub.   No murmur heard.  1/6 systolic murmur   Pulmonary/Chest: Effort normal and breath sounds normal. No stridor. No apnea, no tachypnea and no bradypnea. He is not intubated. No respiratory distress. He has no wheezes. He has no rales. He exhibits no tenderness.   Abdominal: Soft. Normal appearance, normal aorta and bowel sounds are normal. He exhibits no distension. There is no tenderness.   Musculoskeletal: Normal range of motion. He exhibits no edema or deformity.   Neurological: He is alert and " oriented to person, place, and time.   Skin: No abrasion, no bruising, no ecchymosis and no rash noted. He is not diaphoretic. No erythema. No pallor.   Psychiatric: He has a normal mood and affect. His mood appears not anxious. His affect is not angry, not blunt, not labile and not inappropriate. His speech is not slurred. He is not agitated, not aggressive, not withdrawn and not combative. He does not exhibit a depressed mood. He is communicative.       Cardiac Procedures  Cardiac Procedures  Cardiac Procedures        CATH  5/18/20 90% PROX LAD MID 70%, Om1 80%, INF BRANCH 95% RCA NON OBSTRUCTIVE DISEASE    STRESS  Pharm nuc June 2020  inf lat scar per inf ischem mild 45%              CV SURGERY     CABG x 4   5/18 AFTER NONSTEM limA LAD v TO DIAG, v Om1, v Om2  PERIO AFIB        ELECTROPHYSIOLOGY       paf perio op cabg 5/18     ECHO       July 2019 ef 50% inf apical akinetic ef 50% mild mr mild as mean gradient 9 august 2019 infer lateral hypokinetic ejection fraction 45% mild left ear mild MR mild aortic stenosis peak gradient 2 m/s       EKG  nsr on spec st seg inf lat t wave inversions flatteni t wave inversions noaug 2020 no hcange changeng     EKGsinus alysia 54 non spec st flattening ant lat t wave inversions no change  EKG    Assessment/Plan:  History of coronary artery bypass graft  In May 2018 acute inferior infarction followed by bypass surgery x4  Left internal mammary LAD  Vein to diagonal vein to marginal  Vein to second marginal  Pharmacologic nuclear stress test June 2020 inferolateral scar with some emma-infarction ischemia ejection fraction 45%  He has mild fatigue and dyspnea on exertion hard to tell due to obesity COPD or heart failure check BNP with next lab work not on loop diuretic at this time mildly depressed ejection fraction echo in the past 50% EF with mild aortic stenosis recheck echo next visit    PAF (paroxysmal atrial fibrillation) (CMS/[facility])  Perioperatively 2018    Nonrheumatic  aortic valve stenosis  Echo July 2019 peak gradient 2 m/s mild aortic stenosis mean gradient of 8 EF 50% echo next visit    CRI (chronic renal insufficiency)  Creatinine 1.34 potassium 5    Hyperlipidemia  Cholesterol LDL 68 on rosuvastatin    Nicotine abuse  Unchanged she knows the importance of stopping    Controlled type 2 diabetes mellitus with complication, without long-term current use of insulin (CMS/[facility])  Follows with endocrinology Dr. Mccollum  Hemoglobin A1c 8.8 on insulin he checks his sugars 4 times a day is on sliding scale insulin    Hypothyroidism  Levels normal on replacement again follows with endocrinology             No change in medications follow-up in 6 months with lab work and resting echocardiogram  Encouraged him to stop smoking check bnp to see if would benefit from diuretic cardiac to 12 dyspnea exertion due to being overweight, COPD or possibly some mild heart failure he has mildly depressed ejection fraction          This letter was generated using speech recognition software.  Please excuse any typographical errors.  Fernie Rodriguez MD [facility]   8/10/2020  Cc French Garcia DO

## 2020-08-10 ENCOUNTER — OFFICE VISIT (OUTPATIENT)
Dept: CARDIOLOGY | Facility: CLINIC | Age: 80
End: 2020-08-10
Payer: MEDICARE

## 2020-08-10 VITALS
DIASTOLIC BLOOD PRESSURE: 70 MMHG | OXYGEN SATURATION: 98 % | HEART RATE: 66 BPM | BODY MASS INDEX: 39.69 KG/M2 | SYSTOLIC BLOOD PRESSURE: 128 MMHG | WEIGHT: 224 LBS | HEIGHT: 63 IN

## 2020-08-10 DIAGNOSIS — I48.0 PAF (PAROXYSMAL ATRIAL FIBRILLATION) (CMS/HCC): ICD-10-CM

## 2020-08-10 DIAGNOSIS — Z95.1 HISTORY OF CORONARY ARTERY BYPASS GRAFT: ICD-10-CM

## 2020-08-10 DIAGNOSIS — I21.21 ACUTE ST ELEVATION MYOCARDIAL INFARCTION (STEMI) INVOLVING LEFT CIRCUMFLEX CORONARY ARTERY (CMS/HCC): Primary | ICD-10-CM

## 2020-08-10 DIAGNOSIS — C18.7 MALIGNANT NEOPLASM OF SIGMOID COLON (CMS/HCC): ICD-10-CM

## 2020-08-10 DIAGNOSIS — E78.5 HYPERLIPIDEMIA, UNSPECIFIED HYPERLIPIDEMIA TYPE: ICD-10-CM

## 2020-08-10 DIAGNOSIS — E11.8 CONTROLLED TYPE 2 DIABETES MELLITUS WITH COMPLICATION, WITHOUT LONG-TERM CURRENT USE OF INSULIN (CMS/HCC): ICD-10-CM

## 2020-08-10 DIAGNOSIS — Z72.0 NICOTINE ABUSE: ICD-10-CM

## 2020-08-10 DIAGNOSIS — I35.0 NONRHEUMATIC AORTIC VALVE STENOSIS: ICD-10-CM

## 2020-08-10 PROCEDURE — 93000 ELECTROCARDIOGRAM COMPLETE: CPT | Performed by: INTERNAL MEDICINE

## 2020-08-10 PROCEDURE — 99214 OFFICE O/P EST MOD 30 MIN: CPT | Performed by: INTERNAL MEDICINE

## 2020-08-10 RX ORDER — INSULIN DEGLUDEC 200 U/ML
INJECTION, SOLUTION SUBCUTANEOUS
COMMUNITY
Start: 2020-08-08

## 2020-08-10 RX ORDER — ROSUVASTATIN CALCIUM 40 MG/1
40 TABLET, COATED ORAL DAILY
Qty: 90 TABLET | Refills: 3 | Status: SHIPPED | OUTPATIENT
Start: 2020-08-10 | End: 2021-10-01

## 2020-08-10 RX ORDER — METOPROLOL SUCCINATE 25 MG/1
25 TABLET, EXTENDED RELEASE ORAL DAILY
Qty: 90 TABLET | Refills: 3 | Status: SHIPPED | OUTPATIENT
Start: 2020-08-10 | End: 2021-02-08 | Stop reason: SDUPTHER

## 2020-08-10 RX ORDER — DOCUSATE SODIUM 100 MG/1
100 CAPSULE, LIQUID FILLED ORAL ONCE
COMMUNITY
Start: 2018-09-10

## 2020-08-10 NOTE — ASSESSMENT & PLAN NOTE
Follows with endocrinology Dr. Mccollum  Hemoglobin A1c 8.8 on insulin he checks his sugars 4 times a day is on sliding scale insulin

## 2020-08-10 NOTE — ASSESSMENT & PLAN NOTE
In May 2018 acute inferior infarction followed by bypass surgery x4  Left internal mammary LAD  Vein to diagonal vein to marginal  Vein to second marginal  Pharmacologic nuclear stress test June 2020 inferolateral scar with some emma-infarction ischemia ejection fraction 45%  He has mild fatigue and dyspnea on exertion hard to tell due to obesity COPD or heart failure check BNP with next lab work not on loop diuretic at this time mildly depressed ejection fraction echo in the past 50% EF with mild aortic stenosis recheck echo next visit

## 2020-08-10 NOTE — LETTER
August 10, 2020     French Garcia DO  [address]  [place] [address]    Patient: Maximus Daniel  YOB: 1940  Date of Visit: 8/10/2020      Dear Luis    Thank you for referring Maximus Daniel to me for evaluation. Below are my notes for this consultation.    If you have questions, please do not hesitate to call me. I look forward to following your patient along with you.         Sincerely,        Fernie Rodriguez MD        CC: MD Michael Stevens, Fernie MEI MD  8/10/2020 12:01 PM  Sign at close encounter      Cardiology Note    French Garcia DO          Maximus Daniel is a 80 y.o. 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  The time of surgery left internal mammary LAD vein to diagonal vein to marginal  Last echocardiogram August 2019 inferolateral hypokinesis with reduced ejection fraction 45%    He has mild aortic stenosis with peak gradient 2 m/s  He had a pharmacologic nuclear stress test February 2020 lateral scar with emma-infarction ischemia  He is here for diabetes hyperlipidemia hypothyroidism history of colon cancer resected 2018 he continues to smoke    No angina main complaint is some fatigue shortness of breath with exertion          Lab work June 2020    Cholesterol LDL 57 total cholesterol 113 HDL 36 glucose 195 triglycerides 115 creatinine 1.3    Potassium 4.6  Hemoglobin A1c 8.4  CBC normal                                   v        Patient Active Problem List    Diagnosis Date Noted   • CRI (chronic renal insufficiency) 02/03/2020   • Anemia 02/03/2020   • Nicotine abuse 02/03/2020   • Malignant neoplasm of sigmoid colon (CMS/[facility]) 11/06/2018   • Iron deficiency anemia 08/02/2018   • PAF (paroxysmal atrial fibrillation) (CMS/[facility]) 08/02/2018   • History of coronary artery bypass graft 06/11/2018   • Nonrheumatic aortic valve stenosis 06/11/2018   • Acute  ST elevation myocardial infarction (STEMI) (CMS/[facility]) 05/19/2018   • Controlled type 2 diabetes mellitus with complication, without long-term current use of insulin (CMS/[facility]) 05/18/2018   • Incarcerated hernia 05/18/2018   • Hypothyroidism 05/18/2018   • Hyperlipidemia 05/18/2018   • Nephrolithiasis 05/18/2018       Allergy  Patient has no known allergies.    MED LIST     Current Outpatient Medications   Medication Sig Dispense Refill   • aspirin 81 mg enteric coated tablet Take 81 mg by mouth daily.     • docusate sodium (COLACE) 100 mg capsule Take 100 mg by mouth once.     • insulin aspart U-100 (NovoLOG) 100 unit/mL injection Inject under the skin 3 (three) times a day before meals. Pt takes 12 units TID, and an additional 400units as directed      • levothyroxine (SYNTHROID) 200 mcg tablet Take 200 mcg by mouth See admin instr. Along with 50mcg     • levothyroxine sodium (TIROSINT) 50 mcg capsule Take 25 mcg by mouth See admin instr. Along with 200mcg       • metoprolol succinate XL (TOPROL-XL) 25 mg 24 hr tablet Take 1 tablet (25 mg total) by mouth daily. 90 tablet 3   • rosuvastatin (CRESTOR) 40 mg tablet Take 1 tablet (40 mg total) by mouth daily. 90 tablet 3   • TRESIBA FLEXTOUCH U-200 200 unit/mL (3 mL) pen        No current facility-administered medications for this visit.         Review of Systems   Constitution: Negative for malaise/fatigue, weight gain and weight loss.   HENT: Negative for hearing loss and hoarse voice.    Eyes: Negative for visual disturbance.   Cardiovascular: Positive for dyspnea on exertion. Negative for chest pain, claudication, cyanosis, irregular heartbeat, leg swelling, near-syncope, orthopnea, palpitations, paroxysmal nocturnal dyspnea and syncope.   Respiratory: Negative for cough, hemoptysis, shortness of breath, sleep disturbances due to breathing, snoring, sputum production and wheezing.    Endocrine: Negative for cold intolerance and heat intolerance.  "  Hematologic/Lymphatic: Negative.  Negative for bleeding problem. Does not bruise/bleed easily.   Skin: Negative.  Negative for rash.   Musculoskeletal: Positive for joint pain. Negative for arthritis, falls, muscle cramps and myalgias.   Gastrointestinal: Negative for abdominal pain, anorexia, change in bowel habit, constipation, diarrhea, dysphagia, heartburn, jaundice and nausea.   Genitourinary: Negative for frequency and nocturia.   Neurological: Negative for dizziness, focal weakness, headaches, light-headedness, numbness, tremors and vertigo.   Psychiatric/Behavioral: Negative for memory loss. The patient does not have insomnia and is not nervous/anxious.    Allergic/Immunologic: Negative for hives.       Labs dec 2019  Hemoglobin A1c 8.8  Cholesterol LDL 68  Creatinine 1.34 potassium 5 hemoglobin 17  Thyroid studies normal                                                    Objective   Vitals:    08/10/20 1058   BP: 128/70   BP Location: Left upper arm   Patient Position: Sitting   Pulse: 66   SpO2: 98%   Weight: 102 kg (224 lb)   Height: 1.6 m (5' 3\")     Physical Exam   Constitutional: He is oriented to person, place, and time. He appears well-developed and well-nourished. He is active.  Non-toxic appearance. He does not have a sickly appearance. He does not appear ill. No distress. He is not intubated.   HENT:   Head: Normocephalic. Not microcephalic. Head is without raccoon's eyes, without abrasion and without contusion.   Nose: Nose normal.   Eyes: EOM are normal. Left eye exhibits no discharge and no exudate. Right conjunctiva is not injected. Left conjunctiva is not injected. Left conjunctiva has no hemorrhage. No scleral icterus. Pupils are equal.   Neck: Normal range of motion. Neck supple. Normal carotid pulses and no hepatojugular reflux present. Carotid bruit is not present.   Cardiovascular: Normal rate, regular rhythm, normal heart sounds, intact distal pulses and normal pulses. PMI is not " displaced. Exam reveals no gallop and no friction rub.   No murmur heard.  1/6 systolic murmur   Pulmonary/Chest: Effort normal and breath sounds normal. No stridor. No apnea, no tachypnea and no bradypnea. He is not intubated. No respiratory distress. He has no wheezes. He has no rales. He exhibits no tenderness.   Abdominal: Soft. Normal appearance, normal aorta and bowel sounds are normal. He exhibits no distension. There is no tenderness.   Musculoskeletal: Normal range of motion. He exhibits no edema or deformity.   Neurological: He is alert and oriented to person, place, and time.   Skin: No abrasion, no bruising, no ecchymosis and no rash noted. He is not diaphoretic. No erythema. No pallor.   Psychiatric: He has a normal mood and affect. His mood appears not anxious. His affect is not angry, not blunt, not labile and not inappropriate. His speech is not slurred. He is not agitated, not aggressive, not withdrawn and not combative. He does not exhibit a depressed mood. He is communicative.       Cardiac Procedures  Cardiac Procedures  Cardiac Procedures        CATH  5/18/20 90% PROX LAD MID 70%, Om1 80%, INF BRANCH 95% RCA NON OBSTRUCTIVE DISEASE    STRESS  Pharm nuc June 2020  inf lat scar per inf ischem mild 45%              CV SURGERY     CABG x 4   5/18 AFTER NONSTEM limA LAD v TO DIAG, v Om1, v Om2  PERIO AFIB        ELECTROPHYSIOLOGY       paf perio op cabg 5/18     ECHO       July 2019 ef 50% inf apical akinetic ef 50% mild mr mild as mean gradient 9 august 2019 infer lateral hypokinetic ejection fraction 45% mild left ear mild MR mild aortic stenosis peak gradient 2 m/s       EKG  nsr on spec st seg inf lat t wave inversions flatteni t wave inversions noaug 2020 no hcange changeng     EKGsinus alysia 54 non spec st flattening ant lat t wave inversions no change  EKG    Assessment/Plan:  History of coronary artery bypass graft  In May 2018 acute inferior infarction followed by bypass surgery x4  Left  internal mammary LAD  Vein to diagonal vein to marginal  Vein to second marginal  Pharmacologic nuclear stress test June 2020 inferolateral scar with some emma-infarction ischemia ejection fraction 45%  He has mild fatigue and dyspnea on exertion hard to tell due to obesity COPD or heart failure check BNP with next lab work not on loop diuretic at this time mildly depressed ejection fraction echo in the past 50% EF with mild aortic stenosis recheck echo next visit    PAF (paroxysmal atrial fibrillation) (CMS/[facility])  Perioperatively 2018    Nonrheumatic aortic valve stenosis  Echo July 2019 peak gradient 2 m/s mild aortic stenosis mean gradient of 8 EF 50% echo next visit    CRI (chronic renal insufficiency)  Creatinine 1.34 potassium 5    Hyperlipidemia  Cholesterol LDL 68 on rosuvastatin    Nicotine abuse  Unchanged she knows the importance of stopping    Controlled type 2 diabetes mellitus with complication, without long-term current use of insulin (CMS/[facility])  Follows with endocrinology Dr. Mccollum  Hemoglobin A1c 8.8 on insulin he checks his sugars 4 times a day is on sliding scale insulin    Hypothyroidism  Levels normal on replacement again follows with endocrinology             No change in medications follow-up in 6 months with lab work and resting echocardiogram  Encouraged him to stop smoking check bnp to see if would benefit from diuretic cardiac to 12 dyspnea exertion due to being overweight, COPD or possibly some mild heart failure he has mildly depressed ejection fraction          This letter was generated using speech recognition software.  Please excuse any typographical errors.  Fernie Rodriguez MD [facility]   8/10/2020  French Han DO

## 2020-08-26 ENCOUNTER — APPOINTMENT (RX ONLY)
Dept: URBAN - METROPOLITAN AREA CLINIC 374 | Facility: CLINIC | Age: 80
Setting detail: DERMATOLOGY
End: 2020-08-26

## 2020-08-26 DIAGNOSIS — D485 NEOPLASM OF UNCERTAIN BEHAVIOR OF SKIN: ICD-10-CM

## 2020-08-26 DIAGNOSIS — L21.8 OTHER SEBORRHEIC DERMATITIS: ICD-10-CM

## 2020-08-26 DIAGNOSIS — L57.0 ACTINIC KERATOSIS: ICD-10-CM

## 2020-08-26 PROBLEM — D48.5 NEOPLASM OF UNCERTAIN BEHAVIOR OF SKIN: Status: ACTIVE | Noted: 2020-08-26

## 2020-08-26 PROCEDURE — ? BIOPSY BY SHAVE METHOD

## 2020-08-26 PROCEDURE — ? COUNSELING

## 2020-08-26 PROCEDURE — 11102 TANGNTL BX SKIN SINGLE LES: CPT

## 2020-08-26 PROCEDURE — ? PRESCRIPTION MEDICATION MANAGEMENT

## 2020-08-26 PROCEDURE — 17000 DESTRUCT PREMALG LESION: CPT | Mod: 59

## 2020-08-26 PROCEDURE — ? LIQUID NITROGEN

## 2020-08-26 PROCEDURE — ? PHOTO-DOCUMENTATION

## 2020-08-26 PROCEDURE — 99213 OFFICE O/P EST LOW 20 MIN: CPT | Mod: 25

## 2020-08-26 PROCEDURE — 17003 DESTRUCT PREMALG LES 2-14: CPT

## 2020-08-26 ASSESSMENT — LOCATION DETAILED DESCRIPTION DERM
LOCATION DETAILED: RIGHT SUPERIOR MEDIAL FOREHEAD
LOCATION DETAILED: RIGHT SUPERIOR FOREHEAD
LOCATION DETAILED: LEFT SUPERIOR FOREHEAD
LOCATION DETAILED: RIGHT DISTAL DORSAL FOREARM
LOCATION DETAILED: RIGHT SCAPHA
LOCATION DETAILED: RIGHT CAVUM CONCHA

## 2020-08-26 ASSESSMENT — LOCATION ZONE DERM
LOCATION ZONE: EAR
LOCATION ZONE: ARM
LOCATION ZONE: FACE

## 2020-08-26 ASSESSMENT — LOCATION SIMPLE DESCRIPTION DERM
LOCATION SIMPLE: RIGHT FOREARM
LOCATION SIMPLE: RIGHT EAR
LOCATION SIMPLE: LEFT FOREHEAD
LOCATION SIMPLE: RIGHT FOREHEAD

## 2020-08-26 NOTE — PROCEDURE: BIOPSY BY SHAVE METHOD
Detail Level: Detailed
Depth Of Biopsy: dermis
Was A Bandage Applied: Yes
Size Of Lesion In Cm: 1.1
X Size Of Lesion In Cm: 0
Biopsy Type: H and E
Biopsy Method: Personna blade
Anesthesia Type: 1% lidocaine with epinephrine
Anesthesia Volume In Cc (Will Not Render If 0): 0.5
Hemostasis: Electrodesiccation
Wound Care: Petrolatum
Dressing: bandage
Destruction After The Procedure: No
Type Of Destruction Used: Curettage
Curettage Text: The wound bed was treated with curettage after the biopsy was performed.
Cryotherapy Text: The wound bed was treated with cryotherapy after the biopsy was performed.
Electrodesiccation Text: The wound bed was treated with electrodesiccation after the biopsy was performed.
Electrodesiccation And Curettage Text: The wound bed was treated with electrodesiccation and curettage after the biopsy was performed.
Silver Nitrate Text: The wound bed was treated with silver nitrate after the biopsy was performed.
Lab: 6
Lab Facility: 3
Consent: Written consent was obtained and risks were reviewed including but not limited to scarring, infection, bleeding, scabbing, incomplete removal, nerve damage and allergy to anesthesia.
Post-Care Instructions: I reviewed with the patient in detail post-care instructions. Patient is to keep the biopsy site dry overnight, and then apply bacitracin twice daily until healed. Patient may apply hydrogen peroxide soaks to remove any crusting.
Notification Instructions: Patient will be notified of biopsy results. However, patient instructed to call the office if not contacted within 2 weeks.
Billing Type: Third-Party Bill
Information: Selecting Yes will display possible errors in your note based on the variables you have selected. This validation is only offered as a suggestion for you. PLEASE NOTE THAT THE VALIDATION TEXT WILL BE REMOVED WHEN YOU FINALIZE YOUR NOTE. IF YOU WANT TO FAX A PRELIMINARY NOTE YOU WILL NEED TO TOGGLE THIS TO 'NO' IF YOU DO NOT WANT IT IN YOUR FAXED NOTE.

## 2020-12-16 ENCOUNTER — APPOINTMENT (RX ONLY)
Dept: URBAN - METROPOLITAN AREA CLINIC 374 | Facility: CLINIC | Age: 80
Setting detail: DERMATOLOGY
End: 2020-12-16

## 2020-12-16 DIAGNOSIS — L20.89 OTHER ATOPIC DERMATITIS: ICD-10-CM

## 2020-12-16 DIAGNOSIS — L85.8 OTHER SPECIFIED EPIDERMAL THICKENING: ICD-10-CM

## 2020-12-16 DIAGNOSIS — L57.0 ACTINIC KERATOSIS: ICD-10-CM

## 2020-12-16 DIAGNOSIS — L82.1 OTHER SEBORRHEIC KERATOSIS: ICD-10-CM

## 2020-12-16 PROCEDURE — ? PRESCRIPTION MEDICATION MANAGEMENT

## 2020-12-16 PROCEDURE — ? COUNSELING

## 2020-12-16 PROCEDURE — 17003 DESTRUCT PREMALG LES 2-14: CPT

## 2020-12-16 PROCEDURE — 99213 OFFICE O/P EST LOW 20 MIN: CPT | Mod: 25

## 2020-12-16 PROCEDURE — 17000 DESTRUCT PREMALG LESION: CPT | Mod: 59

## 2020-12-16 PROCEDURE — ? PRESCRIPTION

## 2020-12-16 PROCEDURE — ? BIOPSY BY SHAVE METHOD

## 2020-12-16 PROCEDURE — ? LIQUID NITROGEN

## 2020-12-16 PROCEDURE — ? PHOTO-DOCUMENTATION

## 2020-12-16 PROCEDURE — 11102 TANGNTL BX SKIN SINGLE LES: CPT

## 2020-12-16 RX ORDER — TRIAMCINOLONE ACETONIDE 1 MG/G
1 CREAM TOPICAL QDAY
Qty: 1 | Refills: 3 | Status: ERX | COMMUNITY
Start: 2020-12-16 | End: 2023-05-10

## 2020-12-16 RX ADMIN — TRIAMCINOLONE ACETONIDE 1: 1 CREAM TOPICAL at 00:00

## 2020-12-16 ASSESSMENT — LOCATION DETAILED DESCRIPTION DERM
LOCATION DETAILED: LEFT SUPERIOR UPPER BACK
LOCATION DETAILED: LEFT DISTAL PRETIBIAL REGION
LOCATION DETAILED: RIGHT INFERIOR FOREHEAD
LOCATION DETAILED: RIGHT CENTRAL MALAR CHEEK
LOCATION DETAILED: RIGHT MID-UPPER BACK
LOCATION DETAILED: LEFT DISTAL POSTERIOR UPPER ARM
LOCATION DETAILED: RIGHT SUPERIOR FOREHEAD
LOCATION DETAILED: RIGHT DISTAL PRETIBIAL REGION
LOCATION DETAILED: RIGHT THENAR EMINENCE

## 2020-12-16 ASSESSMENT — LOCATION SIMPLE DESCRIPTION DERM
LOCATION SIMPLE: LEFT UPPER BACK
LOCATION SIMPLE: RIGHT FOREHEAD
LOCATION SIMPLE: RIGHT UPPER BACK
LOCATION SIMPLE: RIGHT CHEEK
LOCATION SIMPLE: RIGHT HAND
LOCATION SIMPLE: LEFT POSTERIOR UPPER ARM
LOCATION SIMPLE: LEFT PRETIBIAL REGION
LOCATION SIMPLE: RIGHT PRETIBIAL REGION

## 2020-12-16 ASSESSMENT — LOCATION ZONE DERM
LOCATION ZONE: TRUNK
LOCATION ZONE: HAND
LOCATION ZONE: FACE
LOCATION ZONE: ARM
LOCATION ZONE: LEG

## 2020-12-16 NOTE — PROCEDURE: BIOPSY BY SHAVE METHOD
Detail Level: Detailed
Depth Of Biopsy: dermis
Was A Bandage Applied: Yes
Size Of Lesion In Cm: 0
Biopsy Type: H and E
Biopsy Method: Personna blade
Anesthesia Type: 1% lidocaine with epinephrine
Anesthesia Volume In Cc (Will Not Render If 0): 0.5
Hemostasis: Aluminum Chloride and Electrocautery
Wound Care: Petrolatum
Dressing: bandage
Destruction After The Procedure: No
Type Of Destruction Used: Curettage
Curettage Text: The wound bed was treated with curettage after the biopsy was performed.
Cryotherapy Text: The wound bed was treated with cryotherapy after the biopsy was performed.
Electrodesiccation Text: The wound bed was treated with electrodesiccation after the biopsy was performed.
Electrodesiccation And Curettage Text: The wound bed was treated with electrodesiccation and curettage after the biopsy was performed.
Silver Nitrate Text: The wound bed was treated with silver nitrate after the biopsy was performed.
Lab: 6
Lab Facility: 3
Consent: Written consent was obtained and risks were reviewed including but not limited to scarring, infection, bleeding, scabbing, incomplete removal, nerve damage and allergy to anesthesia.
Post-Care Instructions: I reviewed with the patient in detail post-care instructions. Patient is to keep the biopsy site dry overnight, and then apply bacitracin twice daily until healed. Patient may apply hydrogen peroxide soaks to remove any crusting.
Notification Instructions: Patient will be notified of biopsy results. However, patient instructed to call the office if not contacted within 2 weeks.
Billing Type: Third-Party Bill
Information: Selecting Yes will display possible errors in your note based on the variables you have selected. This validation is only offered as a suggestion for you. PLEASE NOTE THAT THE VALIDATION TEXT WILL BE REMOVED WHEN YOU FINALIZE YOUR NOTE. IF YOU WANT TO FAX A PRELIMINARY NOTE YOU WILL NEED TO TOGGLE THIS TO 'NO' IF YOU DO NOT WANT IT IN YOUR FAXED NOTE.

## 2020-12-16 NOTE — PROCEDURE: LIQUID NITROGEN
Render Post-Care Instructions In Note?: no
Detail Level: Zone
Consent: The patient's consent was obtained including but not limited to risks of crusting, scabbing, blistering, scarring, darker or lighter pigmentary change, recurrence, incomplete removal and infection.
Duration Of Freeze Thaw-Cycle (Seconds): 0
Post-Care Instructions: I reviewed with the patient in detail post-care instructions. Patient is to wear sunprotection, and avoid picking at any of the treated lesions. Pt may apply Vaseline to crusted or scabbing areas.
Total Number Of Aks Treated: 5

## 2020-12-16 NOTE — PROCEDURE: PRESCRIPTION MEDICATION MANAGEMENT
Initiate Treatment: triamcinolone acetonide 0.1 % topical cream Qday: Apply a thin layer to Eczema on legs qday
Render In Strict Bullet Format?: No
Detail Level: Zone

## 2021-02-02 ENCOUNTER — TELEPHONE (OUTPATIENT)
Dept: CARDIOLOGY | Facility: CLINIC | Age: 81
End: 2021-02-02

## 2021-02-02 NOTE — TELEPHONE ENCOUNTER
----- Message from Maximus Daniel sent at 2/2/2021 12:48 PM EST -----  Regarding: Non-Urgent Medical Question  Contact: [phone]  I just registered a my chart account. I am eligible to receive the covid vaccine but have not received any notifications.  How and when will I be notified.

## 2021-02-02 NOTE — TELEPHONE ENCOUNTER
LMOM that [facility] will send him an email with a notice that he has a message in [employer] with a ticket for him to then register for a covi vaccine appointment.  Can not give a time frame but you will get one. Any questions call office back.

## 2021-02-03 ENCOUNTER — APPOINTMENT (RX ONLY)
Dept: URBAN - METROPOLITAN AREA CLINIC 374 | Facility: CLINIC | Age: 81
Setting detail: DERMATOLOGY
End: 2021-02-03

## 2021-02-03 DIAGNOSIS — L82.1 OTHER SEBORRHEIC KERATOSIS: ICD-10-CM

## 2021-02-03 DIAGNOSIS — L57.0 ACTINIC KERATOSIS: ICD-10-CM

## 2021-02-03 PROBLEM — C44.629 SQUAMOUS CELL CARCINOMA OF SKIN OF LEFT UPPER LIMB, INCLUDING SHOULDER: Status: ACTIVE | Noted: 2021-02-03

## 2021-02-03 PROCEDURE — ? CURETTAGE AND DESTRUCTION WITH PATHOLOGY

## 2021-02-03 PROCEDURE — ? LIQUID NITROGEN

## 2021-02-03 PROCEDURE — 17000 DESTRUCT PREMALG LESION: CPT | Mod: 59

## 2021-02-03 PROCEDURE — 17003 DESTRUCT PREMALG LES 2-14: CPT

## 2021-02-03 PROCEDURE — ? COUNSELING

## 2021-02-03 PROCEDURE — 99212 OFFICE O/P EST SF 10 MIN: CPT | Mod: 25

## 2021-02-03 PROCEDURE — 17262 DSTRJ MAL LES T/A/L 1.1-2.0: CPT

## 2021-02-03 PROCEDURE — ? PHOTO-DOCUMENTATION

## 2021-02-03 ASSESSMENT — LOCATION ZONE DERM
LOCATION ZONE: ARM
LOCATION ZONE: HAND

## 2021-02-03 ASSESSMENT — LOCATION DETAILED DESCRIPTION DERM
LOCATION DETAILED: LEFT RADIAL DORSAL HAND
LOCATION DETAILED: LEFT ULNAR DORSAL HAND
LOCATION DETAILED: RIGHT LATERAL PROXIMAL UPPER ARM
LOCATION DETAILED: RIGHT THENAR EMINENCE

## 2021-02-03 ASSESSMENT — LOCATION SIMPLE DESCRIPTION DERM
LOCATION SIMPLE: RIGHT UPPER ARM
LOCATION SIMPLE: LEFT HAND
LOCATION SIMPLE: RIGHT HAND

## 2021-02-03 NOTE — PROCEDURE: CURETTAGE AND DESTRUCTION WITH PATHOLOGY
Body Location Override (Optional - Billing Will Still Be Based On Selected Body Map Location If Applicable): left arm
Detail Level: Detailed
Size Of Lesion After Curettage: 1.5
Anesthesia Type: 1% lidocaine with epinephrine
Anesthesia Volume In Cc: 1
Cautery Type: electrodesiccation
What Was Performed First?: Curettage
Additional Information: (Optional): The wound was cleaned, and a pressure dressing was applied.  The patient received detailed post-op instructions.
Lab: 6
Histology Text: Following the procedure a portion of the curetted material was sent for histologic evaluation.
Biopsy Type: H and E
Render Path Notes In Note?: No
Consent was obtained from the patient. The risks, benefits and alternatives to therapy were discussed in detail. Specifically, the risks of infection, scarring, bleeding, prolonged wound healing, nerve injury, incomplete removal, allergy to anesthesia and recurrence were addressed. Alternatives to ED&C, such as: surgical removal and XRT were also discussed.  Prior to the procedure, the treatment site was clearly identified and confirmed by the patient. All components of Universal Protocol/PAUSE Rule completed.
Post-Care Instructions: I reviewed with the patient in detail post-care instructions. Patient is to keep the area dry for 48 hours, and not to engage in any swimming until the area is healed. Should the patient develop any fevers, chills, bleeding, severe pain patient will contact the office immediately.
Billing Type: Third-Party Bill
Bill As A Line Item Or As Units: Line Item

## 2021-02-03 NOTE — PROCEDURE: LIQUID NITROGEN
Render Post-Care Instructions In Note?: no
Detail Level: Zone
Consent: The patient's consent was obtained including but not limited to risks of crusting, scabbing, blistering, scarring, darker or lighter pigmentary change, recurrence, incomplete removal and infection.
Duration Of Freeze Thaw-Cycle (Seconds): 0
Post-Care Instructions: I reviewed with the patient in detail post-care instructions. Patient is to wear sunprotection, and avoid picking at any of the treated lesions. Pt may apply Vaseline to crusted or scabbing areas.
Total Number Of Aks Treated: 3

## 2021-02-04 ENCOUNTER — TELEPHONE (OUTPATIENT)
Dept: SCHEDULING | Facility: CLINIC | Age: 81
End: 2021-02-04

## 2021-02-04 NOTE — TELEPHONE ENCOUNTER
Maximus, son states father had lab work done 12/20 at [facility] and wants to know if labs have been reviewed    Also requesting to r/s echo + ov  Next avail apt back to back is 5/21 son states too far away requesting sooner apt    [phone]

## 2021-02-06 ENCOUNTER — IMMUNIZATION (OUTPATIENT)
Dept: IMMUNIZATION | Facility: CLINIC | Age: 81
End: 2021-02-06

## 2021-02-08 ENCOUNTER — OFFICE VISIT (OUTPATIENT)
Dept: CARDIOLOGY | Facility: CLINIC | Age: 81
End: 2021-02-08
Payer: MEDICARE

## 2021-02-08 ENCOUNTER — HOSPITAL ENCOUNTER (OUTPATIENT)
Dept: CARDIOLOGY | Facility: CLINIC | Age: 81
Discharge: HOME | End: 2021-02-08
Attending: INTERNAL MEDICINE
Payer: MEDICARE

## 2021-02-08 VITALS
BODY MASS INDEX: 40.75 KG/M2 | HEART RATE: 55 BPM | SYSTOLIC BLOOD PRESSURE: 106 MMHG | WEIGHT: 230 LBS | OXYGEN SATURATION: 97 % | DIASTOLIC BLOOD PRESSURE: 74 MMHG | HEIGHT: 63 IN | RESPIRATION RATE: 16 BRPM

## 2021-02-08 VITALS
BODY MASS INDEX: 39.69 KG/M2 | HEIGHT: 63 IN | WEIGHT: 224 LBS | DIASTOLIC BLOOD PRESSURE: 70 MMHG | SYSTOLIC BLOOD PRESSURE: 130 MMHG

## 2021-02-08 DIAGNOSIS — I48.0 PAF (PAROXYSMAL ATRIAL FIBRILLATION) (CMS/HCC): ICD-10-CM

## 2021-02-08 DIAGNOSIS — Z95.1 HISTORY OF CORONARY ARTERY BYPASS GRAFT: ICD-10-CM

## 2021-02-08 DIAGNOSIS — I10 HYPERTENSION, UNSPECIFIED TYPE: Primary | ICD-10-CM

## 2021-02-08 DIAGNOSIS — I21.21 ACUTE ST ELEVATION MYOCARDIAL INFARCTION (STEMI) INVOLVING LEFT CIRCUMFLEX CORONARY ARTERY (CMS/HCC): ICD-10-CM

## 2021-02-08 DIAGNOSIS — E11.8 CONTROLLED TYPE 2 DIABETES MELLITUS WITH COMPLICATION, WITHOUT LONG-TERM CURRENT USE OF INSULIN (CMS/HCC): ICD-10-CM

## 2021-02-08 LAB
AORTIC ROOT ANNULUS: 3.5 CM
AORTIC VALVE MEAN VELOCITY: 1.7 M/S
AORTIC VALVE VELOCITY TIME INTEGRAL: 62.5 CM
ASCENDING AORTA: 3.1 CM
AV MEAN GRADIENT: 13 MMHG
AV PEAK GRADIENT: 23 MMHG
AV PEAK VELOCITY-S: 2.39 M/S
AV VALVE AREA: 1.5 CM2
BSA FOR ECHO PROCEDURE: 2.13 M2
DOP CALC LVOT STROKE VOLUME: 86.35 ML
E WAVE DECELERATION TIME: 299 MS
E/A RATIO: 0.9
E/E' RATIO: 20.7
E/LAT E' RATIO: 13.5
EDV (BP): 81.5 CM3
EF (A4C): 51.4 %
EF A2C: 60.7 %
EJECTION FRACTION: 57.5 %
EST RIGHT VENT SYSTOLIC PRESSURE BY TRICUSPID REGURGITATION JET: 23 MMHG
ESV (BP): 34.6 CM3
FRACTIONAL SHORTENING: 20.6 %
INTERVENTRICULAR SEPTUM: 1.28 CM
LA ESV (BP): 59.2 CM3
LA ESV INDEX (A2C): 22.11 CM3/M2
LA ESV INDEX (BP): 27.79 CM3/M2
LA/AORTA RATIO: 1
LAAS-AP2: 18.1 CM2
LAAS-AP4: 23.3 CM2
LAD 2D: 3.5 CM
LALD A4C: 5.56 CM
LALD A4C: 5.88 CM
LAV-S: 47.1 CM3
LEFT ATRIUM VOLUME INDEX: 33.24 CM3/M2
LEFT ATRIUM VOLUME: 70.8 CM3
LEFT INTERNAL DIMENSION IN SYSTOLE: 2.69 CM (ref 3–4.53)
LEFT VENTRICLE DIASTOLIC VOLUME INDEX: 41.55 CM3/M2
LEFT VENTRICLE DIASTOLIC VOLUME: 88.5 CM3
LEFT VENTRICLE SYSTOLIC VOLUME INDEX: 20.19 CM3/M2
LEFT VENTRICLE SYSTOLIC VOLUME: 43 CM3
LEFT VENTRICULAR INTERNAL DIMENSION IN DIASTOLE: 3.39 CM (ref 5.1–7.08)
LEFT VENTRICULAR POSTERIOR WALL IN END DIASTOLE: 1.16 CM (ref 0.66–1.22)
LV DIASTOLIC VOLUME: 68.5 CM3
LV ESV (APICAL 2 CHAMBER): 26.9 CM3
LVAD-AP2: 24.6 CM2
LVAD-AP4: 27 CM2
LVAS-AP2: 13.6 CM2
LVAS-AP4: 17.5 CM2
LVEDVI(A2C): 32.16 CM3/M2
LVEDVI(BP): 38.26 CM3/M2
LVESVI(A2C): 12.63 CM3/M2
LVESVI(BP): 16.24 CM3/M2
LVLD-AP2: 7.39 CM
LVLD-AP4: 6.73 CM
LVLS-AP2: 5.77 CM
LVLS-AP4: 5.97 CM
LVOT 2D: 2 CM
LVOT A: 3.14 CM2
LVOT MG: 3 MMHG
LVOT MV: 0.75 M/S
LVOT PEAK VELOCITY: 1.08 M/S
LVOT VTI: 27.5 CM
MV E'TISSUE VEL-LAT: 0.09 M/S
MV E'TISSUE VEL-MED: 0.06 M/S
MV MEAN GRADIENT: 2 MMHG
MV PEAK A VEL: 1.29 M/S
MV PEAK E VEL: 1.2 M/S
MV PEAK GRADIENT: 5 MMHG
MV VALVE AREA BY CONTINUITY EQUATION: 1.63 CM2
MV VTI: 53 CM
POSTERIOR WALL: 1.16 CM
PV PEAK GRADIENT: 5 MMHG
PV PV: 1.12 M/S
RAP: 5 MMHG
RVOT VMAX: 0.92 M/S
RVOT VTI: 21.6 CM
SEPTAL TISSUE DOPPLER FREE WALL LATE DIA VELOCITY (APICAL 4 CHAMBER VIEW): 0.9 M/S
TAPSE: 1.6 CM
TR MAX PG: 18 MMHG
TRICUSPID VALVE PEAK REGURGITATION VELOCITY: 2.13 M/S
Z-SCORE OF LEFT VENTRICULAR DIMENSION IN END DIASTOLE: -5.73
Z-SCORE OF LEFT VENTRICULAR DIMENSION IN END SYSTOLE: -2.49
Z-SCORE OF LEFT VENTRICULAR POSTERIOR WALL IN END DIASTOLE: 1.35

## 2021-02-08 PROCEDURE — 93000 ELECTROCARDIOGRAM COMPLETE: CPT | Performed by: INTERNAL MEDICINE

## 2021-02-08 PROCEDURE — 99214 OFFICE O/P EST MOD 30 MIN: CPT | Performed by: INTERNAL MEDICINE

## 2021-02-08 PROCEDURE — 93306 TTE W/DOPPLER COMPLETE: CPT | Performed by: INTERNAL MEDICINE

## 2021-02-08 RX ORDER — METOPROLOL SUCCINATE 25 MG/1
25 TABLET, EXTENDED RELEASE ORAL DAILY
Qty: 90 TABLET | Refills: 3 | Status: SHIPPED | OUTPATIENT
Start: 2021-02-08 | End: 2021-02-16 | Stop reason: SDUPTHER

## 2021-02-08 NOTE — ASSESSMENT & PLAN NOTE
2018 myocardial infarction followed by bypass  Left internal mammary LAD vein to diagonal vein to marginal vein to second marginal  Echo today February 2021 inferolateral akinesis EF 45% mild to moderate MR mild aortic stenosis peak gradient 2.3 m/s aortic valve a 1.6 cm² stable last ischemic evaluation pharmacologic nuclear stress test June 2020 inferolateral scar emma-infarction ischemia

## 2021-02-08 NOTE — ASSESSMENT & PLAN NOTE
On insulin hemoglobin A1c 9 follows with Dr. Swenson his son told me insulin dose is been adjusted he follows closely with endocrinology every 3 months

## 2021-02-08 NOTE — PROGRESS NOTES
Cardiology Note    French Garcia DO William H Fredy is a 80 y.o. male 1940      he returns for follow-up and echocardiogram images personally reviewed he is here today with his son    He has a History of myocardial infarction 2018 followed by bypass surgery  Left internal mammary LAD vein to diagonal vein to marginal    Echocardiogram today showed feb 2021 inferolateral akinesis EF 45% stable mild AS mean gradient 10 aortic valve area 1.6 cm² mild to moderate MR       last ischemic evaluation pharmacologic nuclear stress test 2020 February lateral scar emma-infarction ischemia is here for diabetes hyperlipidemia hypothyroidism history of colon cancer resected      History of colon cancer resected in the past he is chronically renalinsufficiency continues to smoke continues to smoke    Lab work reviewed from December remarkable for hyperglycemia and elevated creatinine  His insulin is being adjusted his son Told me  He has chronic dyspnea on exertion no change              Lab work December 2020  Cholesterol LDL 56  Triglycerides 166  HDL 36  Potassium 4.4 creatinine 1.27 glucose 219 hemoglobin A1c 9  Hemoglobin 18  Thyroids nor        v        Patient Active Problem List    Diagnosis Date Noted   • CRI (chronic renal insufficiency) 02/03/2020   • Anemia 02/03/2020   • Nicotine abuse 02/03/2020   • Malignant neoplasm of sigmoid colon (CMS/[facility]) 11/06/2018   • Iron deficiency anemia 08/02/2018   • PAF (paroxysmal atrial fibrillation) (CMS/[facility]) 08/02/2018   • History of coronary artery bypass graft 06/11/2018   • Nonrheumatic aortic valve stenosis 06/11/2018   • Acute ST elevation myocardial infarction (STEMI) (CMS/[facility]) 05/19/2018   • Controlled type 2 diabetes mellitus with complication, without long-term current use of insulin (CMS/[facility]) 05/18/2018   • Incarcerated hernia 05/18/2018   • Hypothyroidism 05/18/2018   • Hyperlipidemia 05/18/2018   • Nephrolithiasis 05/18/2018  "      Allergy  Patient has no known allergies.    MED LIST     Current Outpatient Medications   Medication Sig Dispense Refill   • aspirin 81 mg enteric coated tablet Take 81 mg by mouth daily.     • docusate sodium (COLACE) 100 mg capsule Take 100 mg by mouth once.     • insulin aspart U-100 (NovoLOG) 100 unit/mL injection Inject under the skin 3 (three) times a day before meals. Pt takes 12 units TID, and an additional 400units as directed      • levothyroxine (SYNTHROID) 200 mcg tablet Take 200 mcg by mouth See admin instr. Along with 50mcg     • levothyroxine sodium (TIROSINT) 50 mcg capsule Take 50 mcg by mouth See admin instr. Along with 200mcg       • metoprolol succinate XL (TOPROL-XL) 25 mg 24 hr tablet Take 1 tablet (25 mg total) by mouth daily. 90 tablet 3   • rosuvastatin (CRESTOR) 40 mg tablet Take 1 tablet (40 mg total) by mouth daily. 90 tablet 3   • TRESIBA FLEXTOUCH U-200 200 unit/mL (3 mL) pen        No current facility-administered medications for this visit.         Review of Systems       Labs dec 2019  Hemoglobin A1c 8.8  Cholesterol LDL 68  Creatinine 1.34 potassium 5 hemoglobin 17  Thyroid studies normal                                                    Objective   Vitals:    02/08/21 0950   BP: 106/74   BP Location: Left upper arm   Patient Position: Sitting   Pulse: (!) 55   Resp: 16   SpO2: 97%   Weight: 104 kg (230 lb)   Height: 1.6 m (5' 3\")     Physical Exam   Constitutional: He is oriented to person, place, and time. He appears well-developed and well-nourished. He is active.  Non-toxic appearance. He does not have a sickly appearance. He does not appear ill. No distress. He is not intubated.   Overweight   HENT:   Head: Normocephalic. Not microcephalic. Head is without raccoon's eyes, without abrasion and without contusion.   Nose: Nose normal.   Eyes: EOM are normal. Left eye exhibits no discharge and no exudate. Right conjunctiva is not injected. Left conjunctiva is not injected. " Left conjunctiva has no hemorrhage. No scleral icterus. Pupils are equal.   Neck: Normal range of motion. Neck supple. Normal carotid pulses and no hepatojugular reflux present. Carotid bruit is not present.   Cardiovascular: Normal rate, regular rhythm, normal heart sounds, intact distal pulses and normal pulses. PMI is not displaced. Exam reveals no gallop and no friction rub.   No murmur heard.  1/6 systolic murmur   Pulmonary/Chest: Effort normal and breath sounds normal. No stridor. No apnea, no tachypnea and no bradypnea. He is not intubated. No respiratory distress. He has no wheezes. He has no rales. He exhibits no tenderness.   Abdominal: Soft. Normal appearance, normal aorta and bowel sounds are normal. He exhibits no distension. There is no abdominal tenderness.   Musculoskeletal: Normal range of motion.         General: No deformity or edema.   Neurological: He is alert and oriented to person, place, and time.   Skin: No abrasion, no bruising, no ecchymosis and no rash noted. He is not diaphoretic. No erythema. No pallor.   Psychiatric: He has a normal mood and affect. His mood appears not anxious. His affect is not angry, not blunt, not labile and not inappropriate. His speech is not slurred. He is not agitated, not aggressive, not withdrawn and not combative. He does not exhibit a depressed mood. He is communicative.       Cardiac Procedures  Cardiac Procedures  Cardiac Procedures        CATH  5/18/20 90% PROX LAD MID 70%, Om1 80%, INF BRANCH 95% RCA NON OBSTRUCTIVE DISEASE    STRESS  Pharm nuc June 2020  inf lat scar per inf ischem mild 45%              CV SURGERY     CABG x 4   5/18 AFTER NONSTEM limA LAD v TO DIAG, v Om1, v Om2  PERIO AFIB        ELECTROPHYSIOLOGY       paf perio op cabg 5/18     ECHO       July 2019 ef 50% inf apical akinetic ef 50% mild mr mild as mean gradient 9 august 2019 infer lateral hypokinetic ejection fraction 45% mild left ear mild MR mild aortic stenosis peak gradient  2 m/s    Echo feb 2021  Mild to mod mr as mean 10 peak 2.3 ava1.6 mild as  Inf lat akine af 45%       EKG    February 2021 inferolateral T wave inversions                          nsr on spec st seg inf lat t wave inversions flatteni t wave inversions noaug 2020 no hcange changeng     EKGsinus alysia 54 non spec st flattening ant lat t wave inversions no change  EKG    Assessment/Plan:  History of coronary artery bypass graft   2018 myocardial infarction followed by bypass  Left internal mammary LAD vein to diagonal vein to marginal vein to second marginal  Echo today February 2021 inferolateral akinesis EF 45% mild to moderate MR mild aortic stenosis peak gradient 2.3 m/s aortic valve a 1.6 cm² stable last ischemic evaluation pharmacologic nuclear stress test June 2020 inferolateral scar emma-infarction ischemia    PAF (paroxysmal atrial fibrillation) (CMS/[facility])  Perioperatively bypass 2018    Nonrheumatic aortic valve stenosis  Minimal progression still mild aortic stenosis peak gradient in 2.3 m/s aortic valve area 1.6 cm.  Mild to moderate MR EF 45% lateral wall akinesis echo February 2021    CRI (chronic renal insufficiency)  Stable creatinine 1.27 potassium 4.4    Hyperlipidemia  Cholesterol at goal LDL 36 on rosuvastatin    Controlled type 2 diabetes mellitus with complication, without long-term current use of insulin (CMS/[facility])  On insulin hemoglobin A1c 9 follows with Dr. Swenson his son told me insulin dose is been adjusted he follows closely with endocrinology every 3 months    Hypothyroidism  On replacement levels normal follows with endocrine             No change in medications follow-up in 6 months with lab work n  Due for  pharmacologic nuclear stress test echocardiogram February 2022  I will order them at his next visit      This letter was generated using speech recognition software.  Please excuse any typographical errors.  Fernie Rodriguez MD [facility]   2/8/2021   French Garcia DO

## 2021-02-08 NOTE — PATIENT INSTRUCTIONS
6 months labs   Pt remains in the ITU with an alteration in thoughts, perception remains pronounced. Pt less isolative and is noted to be interactive with others.Subdued mood.  Cognitive memory deficits evident  In 1/1. Pt unable to process the events that precipitating his multiple readmissions. Alteration in pts thoughts processes evident scattered disorganized. Spoke of using pot, oils off the street on a regular basis but does not feel this is an issues in treatment. Insight and judgement impaired, Expansive plans to make money with his Father at discharge.Explore aftercare options, resources that pt has at discharge,

## 2021-02-08 NOTE — LETTER
February 8, 2021     French Garcia DO  [address]  [place] [address]    Patient: Maximus Daniel  YOB: 1940  Date of Visit: 2/8/2021      Dear Luis    Thank you for referring Maximus Daniel to me for evaluation. Below are my notes for this consultation.    If you have questions, please do not hesitate to call me. I look forward to following your patient along with you.         Sincerely,        Fernie Rodriguez MD        CC: MD Michael Stevens, Fernie MEI MD  2/8/2021 10:15 AM  Sign when Signing Visit      Cardiology Note    French Garcia DO          Maximus Dnaiel is a 80 y.o. male 1940      he returns for follow-up and echocardiogram images personally reviewed he is here today with his son    He has a History of myocardial infarction 2018 followed by bypass surgery  Left internal mammary LAD vein to diagonal vein to marginal    Echocardiogram today showed feb 2021 inferolateral akinesis EF 45% stable mild AS mean gradient 10 aortic valve area 1.6 cm² mild to moderate MR       last ischemic evaluation pharmacologic nuclear stress test 2020 February lateral scar emma-infarction ischemia is here for diabetes hyperlipidemia hypothyroidism history of colon cancer resected      History of colon cancer resected in the past he is chronically renalinsufficiency continues to smoke continues to smoke    Lab work reviewed from December remarkable for hyperglycemia and elevated creatinine  His insulin is being adjusted his son Told me  He has chronic dyspnea on exertion no change              Lab work December 2020  Cholesterol LDL 56  Triglycerides 166  HDL 36  Potassium 4.4 creatinine 1.27 glucose 219 hemoglobin A1c 9  Hemoglobin 18  Thyroids nor        v        Patient Active Problem List    Diagnosis Date Noted   • CRI (chronic renal insufficiency) 02/03/2020   • Anemia 02/03/2020   • Nicotine abuse 02/03/2020   • Malignant neoplasm of sigmoid colon (CMS/[facility]) 11/06/2018  "  • Iron deficiency anemia 08/02/2018   • PAF (paroxysmal atrial fibrillation) (CMS/[facility]) 08/02/2018   • History of coronary artery bypass graft 06/11/2018   • Nonrheumatic aortic valve stenosis 06/11/2018   • Acute ST elevation myocardial infarction (STEMI) (CMS/[facility]) 05/19/2018   • Controlled type 2 diabetes mellitus with complication, without long-term current use of insulin (CMS/[facility]) 05/18/2018   • Incarcerated hernia 05/18/2018   • Hypothyroidism 05/18/2018   • Hyperlipidemia 05/18/2018   • Nephrolithiasis 05/18/2018       Allergy  Patient has no known allergies.    MED LIST     Current Outpatient Medications   Medication Sig Dispense Refill   • aspirin 81 mg enteric coated tablet Take 81 mg by mouth daily.     • docusate sodium (COLACE) 100 mg capsule Take 100 mg by mouth once.     • insulin aspart U-100 (NovoLOG) 100 unit/mL injection Inject under the skin 3 (three) times a day before meals. Pt takes 12 units TID, and an additional 400units as directed      • levothyroxine (SYNTHROID) 200 mcg tablet Take 200 mcg by mouth See admin instr. Along with 50mcg     • levothyroxine sodium (TIROSINT) 50 mcg capsule Take 50 mcg by mouth See admin instr. Along with 200mcg       • metoprolol succinate XL (TOPROL-XL) 25 mg 24 hr tablet Take 1 tablet (25 mg total) by mouth daily. 90 tablet 3   • rosuvastatin (CRESTOR) 40 mg tablet Take 1 tablet (40 mg total) by mouth daily. 90 tablet 3   • TRESIBA FLEXTOUCH U-200 200 unit/mL (3 mL) pen        No current facility-administered medications for this visit.         Review of Systems       Labs dec 2019  Hemoglobin A1c 8.8  Cholesterol LDL 68  Creatinine 1.34 potassium 5 hemoglobin 17  Thyroid studies normal                                                    Objective   Vitals:    02/08/21 0950   BP: 106/74   BP Location: Left upper arm   Patient Position: Sitting   Pulse: (!) 55   Resp: 16   SpO2: 97%   Weight: 104 kg (230 lb)   Height: 1.6 m (5' 3\")     Physical Exam "   Constitutional: He is oriented to person, place, and time. He appears well-developed and well-nourished. He is active.  Non-toxic appearance. He does not have a sickly appearance. He does not appear ill. No distress. He is not intubated.   Overweight   HENT:   Head: Normocephalic. Not microcephalic. Head is without raccoon's eyes, without abrasion and without contusion.   Nose: Nose normal.   Eyes: EOM are normal. Left eye exhibits no discharge and no exudate. Right conjunctiva is not injected. Left conjunctiva is not injected. Left conjunctiva has no hemorrhage. No scleral icterus. Pupils are equal.   Neck: Normal range of motion. Neck supple. Normal carotid pulses and no hepatojugular reflux present. Carotid bruit is not present.   Cardiovascular: Normal rate, regular rhythm, normal heart sounds, intact distal pulses and normal pulses. PMI is not displaced. Exam reveals no gallop and no friction rub.   No murmur heard.  1/6 systolic murmur   Pulmonary/Chest: Effort normal and breath sounds normal. No stridor. No apnea, no tachypnea and no bradypnea. He is not intubated. No respiratory distress. He has no wheezes. He has no rales. He exhibits no tenderness.   Abdominal: Soft. Normal appearance, normal aorta and bowel sounds are normal. He exhibits no distension. There is no abdominal tenderness.   Musculoskeletal: Normal range of motion.         General: No deformity or edema.   Neurological: He is alert and oriented to person, place, and time.   Skin: No abrasion, no bruising, no ecchymosis and no rash noted. He is not diaphoretic. No erythema. No pallor.   Psychiatric: He has a normal mood and affect. His mood appears not anxious. His affect is not angry, not blunt, not labile and not inappropriate. His speech is not slurred. He is not agitated, not aggressive, not withdrawn and not combative. He does not exhibit a depressed mood. He is communicative.       Cardiac Procedures  Cardiac Procedures  Cardiac  Procedures        CATH  5/18/20 90% PROX LAD MID 70%, Om1 80%, INF BRANCH 95% RCA NON OBSTRUCTIVE DISEASE    STRESS  Pharm nuc June 2020  inf lat scar per inf ischem mild 45%              CV SURGERY     CABG x 4   5/18 AFTER NONSTEM limA LAD v TO DIAG, v Om1, v Om2  PERIO AFIB        ELECTROPHYSIOLOGY       paf perio op cabg 5/18     ECHO       July 2019 ef 50% inf apical akinetic ef 50% mild mr mild as mean gradient 9    august 2019 infer lateral hypokinetic ejection fraction 45% mild left ear mild MR mild aortic stenosis peak gradient 2 m/s    Echo feb 2021  Mild to mod mr as mean 10 peak 2.3 ava1.6 mild as  Inf lat akine af 45%       EKG    February 2021 inferolateral T wave inversions                          nsr on spec st seg inf lat t wave inversions flatteni t wave inversions noaug 2020 no hcange changeng     EKGsinus alysia 54 non spec st flattening ant lat t wave inversions no change  EKG    Assessment/Plan:  History of coronary artery bypass graft   2018 myocardial infarction followed by bypass  Left internal mammary LAD vein to diagonal vein to marginal vein to second marginal  Echo today February 2021 inferolateral akinesis EF 45% mild to moderate MR mild aortic stenosis peak gradient 2.3 m/s aortic valve a 1.6 cm² stable last ischemic evaluation pharmacologic nuclear stress test June 2020 inferolateral scar emma-infarction ischemia    PAF (paroxysmal atrial fibrillation) (CMS/[facility])  Perioperatively bypass 2018    Nonrheumatic aortic valve stenosis  Minimal progression still mild aortic stenosis peak gradient in 2.3 m/s aortic valve area 1.6 cm.  Mild to moderate MR EF 45% lateral wall akinesis echo February 2021    CRI (chronic renal insufficiency)  Stable creatinine 1.27 potassium 4.4    Hyperlipidemia  Cholesterol at goal LDL 36 on rosuvastatin    Controlled type 2 diabetes mellitus with complication, without long-term current use of insulin (CMS/[facility])  On insulin hemoglobin A1c 9 follows with Dr. Swenson  his son told me insulin dose is been adjusted he follows closely with endocrinology every 3 months    Hypothyroidism  On replacement levels normal follows with endocrine             No change in medications follow-up in 6 months with lab work n  Due for  pharmacologic nuclear stress test echocardiogram February 2022  I will order them at his next visit      This letter was generated using speech recognition software.  Please excuse any typographical errors.  Fernie Rodriguez MD [facility]   2/8/2021  Cc French Garcia, DO

## 2021-02-08 NOTE — ASSESSMENT & PLAN NOTE
Minimal progression still mild aortic stenosis peak gradient in 2.3 m/s aortic valve area 1.6 cm.  Mild to moderate MR EF 45% lateral wall akinesis echo February 2021

## 2021-02-16 RX ORDER — METOPROLOL SUCCINATE 25 MG/1
25 TABLET, EXTENDED RELEASE ORAL DAILY
Qty: 90 TABLET | Refills: 3 | Status: SHIPPED | OUTPATIENT
Start: 2021-02-16 | End: 2021-11-01 | Stop reason: SDUPTHER

## 2021-02-16 NOTE — TELEPHONE ENCOUNTER
Medicine Refill Request    Last Office Visit: Visit date not found  Last Telemedicine Visit: Visit date not found    Pts daughter req refill on metoprolol       Current Outpatient Medications:   •  aspirin 81 mg enteric coated tablet, Take 81 mg by mouth daily., Disp: , Rfl:   •  docusate sodium (COLACE) 100 mg capsule, Take 100 mg by mouth once., Disp: , Rfl:   •  insulin aspart U-100 (NovoLOG) 100 unit/mL injection, Inject under the skin 3 (three) times a day before meals. Pt takes 12 units TID, and an additional 400units as directed , Disp: , Rfl:   •  levothyroxine (SYNTHROID) 200 mcg tablet, Take 200 mcg by mouth See admin instr. Along with 50mcg, Disp: , Rfl:   •  levothyroxine sodium (TIROSINT) 50 mcg capsule, Take 50 mcg by mouth See admin instr. Along with 200mcg  , Disp: , Rfl:   •  metoprolol succinate XL (TOPROL-XL) 25 mg 24 hr tablet, Take 1 tablet (25 mg total) by mouth daily., Disp: 90 tablet, Rfl: 3  •  rosuvastatin (CRESTOR) 40 mg tablet, Take 1 tablet (40 mg total) by mouth daily., Disp: 90 tablet, Rfl: 3  •  TRESIBA FLEXTOUCH U-200 200 unit/mL (3 mL) pen, , Disp: , Rfl:       BP Readings from Last 3 Encounters:   02/08/21 130/70   02/08/21 106/74   08/10/20 128/70       Recent Lab results:  Lab Results   Component Value Date    CHOL 152 05/19/2018   ,   Lab Results   Component Value Date    HDL 29 (L) 05/19/2018   ,   Lab Results   Component Value Date    LDLCALC 103 (H) 05/19/2018   ,   Lab Results   Component Value Date    TRIG 101 05/19/2018        Lab Results   Component Value Date    GLUCOSE 94 05/26/2018   ,   Lab Results   Component Value Date    HGBA1C 10.5 (H) 05/19/2018         Lab Results   Component Value Date    CREATININE 1.3 05/26/2018       Lab Results   Component Value Date    TSH 15.64 (H) 09/13/2019

## 2021-03-12 ENCOUNTER — IMMUNIZATION (OUTPATIENT)
Dept: IMMUNIZATION | Facility: CLINIC | Age: 81
End: 2021-03-12
Attending: FAMILY MEDICINE

## 2021-05-10 ENCOUNTER — RX ONLY (OUTPATIENT)
Age: 81
Setting detail: RX ONLY
End: 2021-05-10

## 2021-05-10 RX ORDER — HYDROCORTISONE 25 MG/G
CREAM TOPICAL
Qty: 1 | Refills: 3 | Status: ERX | COMMUNITY
Start: 2021-05-10

## 2021-06-23 ENCOUNTER — APPOINTMENT (RX ONLY)
Dept: URBAN - METROPOLITAN AREA CLINIC 374 | Facility: CLINIC | Age: 81
Setting detail: DERMATOLOGY
End: 2021-06-23

## 2021-06-23 DIAGNOSIS — Z85.828 PERSONAL HISTORY OF OTHER MALIGNANT NEOPLASM OF SKIN: ICD-10-CM

## 2021-06-23 DIAGNOSIS — L57.0 ACTINIC KERATOSIS: ICD-10-CM

## 2021-06-23 PROCEDURE — 17000 DESTRUCT PREMALG LESION: CPT

## 2021-06-23 PROCEDURE — 99212 OFFICE O/P EST SF 10 MIN: CPT | Mod: 25

## 2021-06-23 PROCEDURE — 17003 DESTRUCT PREMALG LES 2-14: CPT

## 2021-06-23 PROCEDURE — ? PREMALIGNANT DESTRUCTION

## 2021-06-23 ASSESSMENT — LOCATION DETAILED DESCRIPTION DERM
LOCATION DETAILED: RIGHT FOREHEAD
LOCATION DETAILED: RIGHT CENTRAL FRONTAL SCALP
LOCATION DETAILED: LEFT CENTRAL PARIETAL SCALP
LOCATION DETAILED: RIGHT SUPERIOR FOREHEAD
LOCATION DETAILED: RIGHT CENTRAL PARIETAL SCALP
LOCATION DETAILED: LEFT PROXIMAL LATERAL POSTERIOR UPPER ARM
LOCATION DETAILED: RIGHT SUPERIOR MEDIAL FOREHEAD
LOCATION DETAILED: RIGHT SUPERIOR LATERAL FOREHEAD
LOCATION DETAILED: LEFT SUPERIOR PARIETAL SCALP
LOCATION DETAILED: RIGHT LATERAL FOREHEAD

## 2021-06-23 ASSESSMENT — LOCATION SIMPLE DESCRIPTION DERM
LOCATION SIMPLE: SCALP
LOCATION SIMPLE: LEFT UPPER ARM
LOCATION SIMPLE: RIGHT SCALP
LOCATION SIMPLE: RIGHT FOREHEAD

## 2021-06-23 ASSESSMENT — LOCATION ZONE DERM
LOCATION ZONE: FACE
LOCATION ZONE: ARM
LOCATION ZONE: SCALP

## 2021-06-23 NOTE — PROCEDURE: PREMALIGNANT DESTRUCTION
Detail Level: Detailed
Anesthesia Volume In Cc: 0
Post-Care Instructions: I reviewed with the patient in detail post-care instructions. Patient is to wear sunprotection, and avoid picking at any of the treated lesions. Pt may apply Vaseline to crusted or scabbing areas.
Render Post-Care In The Note: No
Method: liquid nitrogen
Consent: The patient's consent was obtained including but not limited to risks of crusting, scabbing, blistering, scarring, darker or lighter pigmentary change, recurrence, incomplete removal and infection.

## 2021-08-03 NOTE — PROGRESS NOTES
Cardiology Note    French Garcia DO William H Fredy is a 81 y.o. male 1940     He is here today with his son since I last saw him he had fallen broken his February 2021 shoulder had surgery was in rehab for.  Time  His daughter checks on his medicines daily son lives about 30 miles away taken to the doctor  Diabetic medications are being adjusted he said he was thrown off and he was in rehab with his sugars  His son feels he is more short of breath with minimal exertion he denies any chest pain      2018 with myocardial infarction followed by by bypass surgery  Left internal mammary to LAD vein to diagonal vein to marginal vein to second marginal  Last echocardiogram February 2021 inferolateral akinesis ejection fraction 45% mild aortic stenosis peak gradient 2.3 m/s aortic valve area 1.6 cm² last ischemic evaluation scar emma-infarction ischemia ischemia June 2020  Chronic renal sufficiency diabetes hypothyroidism  He has a long history of cigarette smoking stopped in February because he has not been able to get cigarettes, no longer drives  I have June 2021 lab work from your office I do not have a CBC glucose elevated   I do not have cholesterol  Chronic renal insufficiency creatinine ranges from 1.2-1.3  I do not have recent lipids              lab work June 2021 glucose 202  Creatinine 1.3  Potassium 3.9 hemoglobin A1c 9.2  No lipids  Thyroids normal  CBC normal                      v        Patient Active Problem List    Diagnosis Date Noted   • CRI (chronic renal insufficiency) 02/03/2020   • Anemia 02/03/2020   • Nicotine dependence in remission 02/03/2020   • Malignant neoplasm of sigmoid colon (CMS/[facility]) 11/06/2018   • Iron deficiency anemia 08/02/2018   • PAF (paroxysmal atrial fibrillation) (CMS/[facility]) 08/02/2018   • History of coronary artery bypass graft 06/11/2018   • Nonrheumatic aortic valve stenosis 06/11/2018   • Acute ST elevation myocardial infarction (STEMI) (CMS/[facility])  05/19/2018   • Controlled type 2 diabetes mellitus with complication, without long-term current use of insulin (CMS/[facility]) 05/18/2018   • Incarcerated hernia 05/18/2018   • Hypothyroidism 05/18/2018   • Hyperlipidemia 05/18/2018   • Nephrolithiasis 05/18/2018       Allergy  Patient has no known allergies.    MED LIST     Current Outpatient Medications   Medication Sig Dispense Refill   • acetaminophen (TYLENOL) 325 mg tablet 650 mg every 4 (four) hours as needed.       • albuterol HFA (VENTOLIN HFA) 90 mcg/actuation inhaler inhale 2 puffs by mouth every 6 hours     • aspirin 81 mg enteric coated tablet Take 81 mg by mouth daily.     • docusate sodium (COLACE) 100 mg capsule Take 100 mg by mouth once.     • hydrocortisone 2.5 % cream as needed.       • insulin aspart U-100 (NovoLOG) 100 unit/mL injection Inject under the skin 3 (three) times a day before meals. Pt takes 12 units TID, and an additional 400units as directed      • levothyroxine (SYNTHROID) 200 mcg tablet Take 200 mcg by mouth See admin instr. Along with 50mcg     • levothyroxine sodium (TIROSINT) 50 mcg capsule Take 50 mcg by mouth See admin instr. Along with 200mcg       • metoprolol succinate XL (TOPROL-XL) 25 mg 24 hr tablet Take 1 tablet (25 mg total) by mouth daily. 90 tablet 3   • rosuvastatin (CRESTOR) 40 mg tablet Take 1 tablet (40 mg total) by mouth daily. 90 tablet 3   • tamsulosin (FLOMAX) 0.4 mg capsule Take 0.4 mg by mouth once daily.     • TRESIBA FLEXTOUCH U-200 200 unit/mL (3 mL) pen        No current facility-administered medications for this visit.        ROS    Labs dec 2019  Hemoglobin A1c 8.8  Cholesterol LDL 68  Creatinine 1.34 potassium 5 hemoglobin 17  Thyroid studies normal                                                    Objective   Vitals:    08/09/21 0830   BP: 140/86   BP Location: Right upper arm   Patient Position: Sitting   Pulse: (!) 54   SpO2: 96%   Weight: 104 kg (230 lb)     Physical Exam  Constitutional:        General: He is not in acute distress.     Appearance: Normal appearance. He is well-developed. He is not ill-appearing, toxic-appearing or diaphoretic.      Interventions: He is not intubated.     Comments: Overweight   HENT:      Head: Normocephalic. Not microcephalic. No raccoon eyes, abrasion or contusion.      Nose: Nose normal.   Eyes:      General: No scleral icterus.        Left eye: No discharge.      Conjunctiva/sclera:      Right eye: Right conjunctiva is not injected.      Left eye: Left conjunctiva is not injected. No exudate or hemorrhage.     Pupils: Pupils are equal.   Neck:      Vascular: Normal carotid pulses. No carotid bruit or hepatojugular reflux.   Cardiovascular:      Rate and Rhythm: Normal rate and regular rhythm.      Chest Wall: PMI is not displaced.      Pulses: Normal pulses and intact distal pulses.      Heart sounds: Normal heart sounds. No murmur heard.   No friction rub. No gallop.       Comments: 1/6 systolic murmur  Pulmonary:      Effort: Pulmonary effort is normal. No tachypnea, bradypnea or respiratory distress. He is not intubated.      Breath sounds: Normal breath sounds. No stridor. No wheezing or rales.   Chest:      Chest wall: No tenderness.   Abdominal:      General: Bowel sounds are normal. There is no distension.      Palpations: Abdomen is soft.      Tenderness: There is no abdominal tenderness.   Musculoskeletal:         General: No deformity. Normal range of motion.      Cervical back: Normal range of motion and neck supple.   Skin:     Coloration: Skin is not pale.      Findings: No abrasion, bruising, ecchymosis, erythema or rash.   Neurological:      Mental Status: He is alert and oriented to person, place, and time.   Psychiatric:         Mood and Affect: Mood is not anxious or depressed. Affect is not labile, blunt, angry or inappropriate.         Speech: He is communicative. Speech is not slurred.         Behavior: Behavior is not agitated, aggressive,  withdrawn or combative.         Cardiac Procedures  Cardiac Procedures  Cardiac Procedures        CATH  5/18/20 90% PROX LAD MID 70%, Om1 80%, INF BRANCH 95% RCA NON OBSTRUCTIVE DISEASE    STRESS  Pharm nuc June 2020  inf lat scar per inf ischem mild 45%              CV SURGERY     CABG x 4   5/18 AFTER NONSTEM limA LAD v TO DIAG, v Om1, v Om2  PERIO AFIB         STRESS  OCT 2021  .  The EKG does not meet evidence for pharmacologic induced ischemia.  2.  Abnormal nuclear perfusion scan.  Moderate to large inferior and inferior lateral defect which is fixed and consistent with scar in the distribution of the left circumflex coronary artery.  3.  Gated images: Moderate inferior lateral hypokinesis.  The left ventricular ejection fraction is 50%.  Compared to the pharmacologic nuclear stress test from an outside institution dated 7/15/2020: No significant change.         ECHO       July 2019 ef 50% inf apical akinetic ef 50% mild mr mild as mean gradient 9    august 2019 infer lateral hypokinetic ejection fraction 45% mild left ear mild MR mild aortic stenosis peak gradient 2 m/s    Echo feb 2021  Mild to mod mr as mean 10 peak 2.3 ava1.6 mild as  Inf lat akine af 45%       ELECTROPHYSIOLOGY       paf perio op cabg 5/18    EKG        Aug 2021 inf lat t wave inversion sno change                                              nsr on spec st seg inf lat t wave inversions flatteni t wave inversions noaug 2020 no hcange changeng     EKGsinus alysia 54 non spec st flattening ant lat t wave inversions no change  EKG    Assessment/Plan:  History of coronary artery bypass graft  History of myocardial infarction bypass surgery 2018 with mildly depressed ejection fraction  LIMA to LAD vein to diagonal vein to marginal vein to second marginal perioperative last pharmacologic stress test June 2020 inferolateral scar small inferior emma-infarction ischemia EF 45% mild aortic stenosis peak gradient 2.3 m/s moderate MR  Increase  shortness of breath could be multifactorial  Deconditioning COPD or heart failure  Reassess with pharmacologic stress test echocardiogram lab work including BNP      Hyperlipidemia  On statin lab work pending    Controlled type 2 diabetes mellitus with complication, without long-term current use of insulin (CMS/[facility])  Medications being adjusted follows with  on insulin    Nonrheumatic aortic valve stenosis  Last echocardiogram February 2021 moderate mitral regurgitation mild aortic stenosis aortic valve a 1.6 cm.  Recheck check BMP see if diuretic therapy would help with the dyspnea on exertion    CRI (chronic renal insufficiency)  1.3 potassium three-point    PAF (paroxysmal atrial fibrillation) (CMS/[facility])  Perioperatively CABG 2018           History of CABG myocardial infarction moderately depressed ejection fraction ef of 45%  Had a fall with surgery feb r 2021 now some increased shortness of breath multifactorial may be due to moderately depressed ejection fraction mild aortic stenosis  Or COPD recently quit smoking because of his inability to get cigarettes not able to drive anymore reassess cardiac situation with pharmacologic stress test and echocardiogram to see if diuretic therapy may be helpful  I will see him back after the studies are complete              This letter was generated using speech recognition software.  Please excuse any typographical errors.  Fernie Rodriguez MD [facility]   8/9/2021  Cc PearlsFrench castellon DO

## 2021-08-09 ENCOUNTER — OFFICE VISIT (OUTPATIENT)
Dept: CARDIOLOGY | Facility: CLINIC | Age: 81
End: 2021-08-09
Payer: MEDICARE

## 2021-08-09 VITALS
WEIGHT: 230 LBS | SYSTOLIC BLOOD PRESSURE: 140 MMHG | HEART RATE: 54 BPM | OXYGEN SATURATION: 96 % | DIASTOLIC BLOOD PRESSURE: 86 MMHG | BODY MASS INDEX: 40.74 KG/M2

## 2021-08-09 DIAGNOSIS — Z72.0 NICOTINE ABUSE: ICD-10-CM

## 2021-08-09 DIAGNOSIS — E03.9 HYPOTHYROIDISM, UNSPECIFIED TYPE: ICD-10-CM

## 2021-08-09 DIAGNOSIS — E11.8 CONTROLLED TYPE 2 DIABETES MELLITUS WITH COMPLICATION, WITHOUT LONG-TERM CURRENT USE OF INSULIN (CMS/HCC): ICD-10-CM

## 2021-08-09 DIAGNOSIS — N18.9 CHRONIC RENAL IMPAIRMENT, UNSPECIFIED CKD STAGE: Primary | ICD-10-CM

## 2021-08-09 DIAGNOSIS — I35.0 NONRHEUMATIC AORTIC VALVE STENOSIS: ICD-10-CM

## 2021-08-09 DIAGNOSIS — E78.5 HYPERLIPIDEMIA, UNSPECIFIED HYPERLIPIDEMIA TYPE: ICD-10-CM

## 2021-08-09 DIAGNOSIS — Z95.1 HISTORY OF CORONARY ARTERY BYPASS GRAFT: ICD-10-CM

## 2021-08-09 DIAGNOSIS — F17.211 CIGARETTE NICOTINE DEPENDENCE IN REMISSION: ICD-10-CM

## 2021-08-09 PROBLEM — F17.201 NICOTINE DEPENDENCE IN REMISSION: Status: ACTIVE | Noted: 2020-02-03

## 2021-08-09 PROCEDURE — 99214 OFFICE O/P EST MOD 30 MIN: CPT | Performed by: INTERNAL MEDICINE

## 2021-08-09 PROCEDURE — 93000 ELECTROCARDIOGRAM COMPLETE: CPT | Performed by: INTERNAL MEDICINE

## 2021-08-09 RX ORDER — ACETAMINOPHEN 325 MG/1
650 TABLET ORAL EVERY 4 HOURS PRN
COMMUNITY
Start: 2021-02-24

## 2021-08-09 RX ORDER — ALBUTEROL SULFATE 90 UG/1
INHALANT RESPIRATORY (INHALATION)
COMMUNITY
Start: 2021-07-08

## 2021-08-09 RX ORDER — HYDROCORTISONE 25 MG/G
CREAM TOPICAL AS NEEDED
COMMUNITY
Start: 2021-05-10

## 2021-08-09 RX ORDER — TAMSULOSIN HYDROCHLORIDE 0.4 MG/1
0.4 CAPSULE ORAL
COMMUNITY
Start: 2021-05-15

## 2021-08-09 NOTE — ASSESSMENT & PLAN NOTE
Last echocardiogram February 2021 moderate mitral regurgitation mild aortic stenosis aortic valve a 1.6 cm.  Recheck check BMP see if diuretic therapy would help with the dyspnea on exertion

## 2021-08-09 NOTE — ASSESSMENT & PLAN NOTE
History of myocardial infarction bypass surgery 2018 with mildly depressed ejection fraction  LIMA to LAD vein to diagonal vein to marginal vein to second marginal perioperative last pharmacologic stress test June 2020 inferolateral scar small inferior emma-infarction ischemia EF 45% mild aortic stenosis peak gradient 2.3 m/s moderate MR  Increase shortness of breath could be multifactorial  Deconditioning COPD or heart failure  Reassess with pharmacologic stress test echocardiogram lab work including BNP

## 2021-08-10 ENCOUNTER — RX ONLY (OUTPATIENT)
Age: 81
Setting detail: RX ONLY
End: 2021-08-10

## 2021-08-10 RX ORDER — KETOCONAZOLE 20 MG/ML
1 SHAMPOO, SUSPENSION TOPICAL QDAY
Qty: 1 | Refills: 2 | Status: ERX | COMMUNITY
Start: 2021-08-10 | End: 2023-05-10

## 2021-10-13 ENCOUNTER — APPOINTMENT (RX ONLY)
Dept: URBAN - METROPOLITAN AREA CLINIC 374 | Facility: CLINIC | Age: 81
Setting detail: DERMATOLOGY
End: 2021-10-13

## 2021-10-13 DIAGNOSIS — L57.0 ACTINIC KERATOSIS: ICD-10-CM

## 2021-10-13 DIAGNOSIS — L21.8 OTHER SEBORRHEIC DERMATITIS: ICD-10-CM

## 2021-10-13 PROCEDURE — ? PRESCRIPTION MEDICATION MANAGEMENT

## 2021-10-13 PROCEDURE — ? PREMALIGNANT DESTRUCTION

## 2021-10-13 PROCEDURE — 99213 OFFICE O/P EST LOW 20 MIN: CPT | Mod: 25

## 2021-10-13 PROCEDURE — 17003 DESTRUCT PREMALG LES 2-14: CPT

## 2021-10-13 PROCEDURE — 17000 DESTRUCT PREMALG LESION: CPT

## 2021-10-13 PROCEDURE — ? PRESCRIPTION

## 2021-10-13 RX ORDER — CLOTRIMAZOLE AND BETAMETHASONE DIPROPIONATE 10; .5 MG/G; MG/G
CREAM TOPICAL QDAY
Qty: 45 | Refills: 2 | Status: ERX | COMMUNITY
Start: 2021-10-13

## 2021-10-13 RX ADMIN — CLOTRIMAZOLE AND BETAMETHASONE DIPROPIONATE: 10; .5 CREAM TOPICAL at 00:00

## 2021-10-13 ASSESSMENT — LOCATION DETAILED DESCRIPTION DERM
LOCATION DETAILED: LEFT VENTRAL DISTAL FOREARM
LOCATION DETAILED: LEFT MEDIAL FRONTAL SCALP
LOCATION DETAILED: LEFT POSTERIOR AXILLA
LOCATION DETAILED: LEFT VENTRAL PROXIMAL FOREARM
LOCATION DETAILED: RIGHT CENTRAL TEMPLE

## 2021-10-13 ASSESSMENT — LOCATION ZONE DERM
LOCATION ZONE: FACE
LOCATION ZONE: ARM
LOCATION ZONE: SCALP
LOCATION ZONE: AXILLAE

## 2021-10-13 ASSESSMENT — LOCATION SIMPLE DESCRIPTION DERM
LOCATION SIMPLE: RIGHT TEMPLE
LOCATION SIMPLE: LEFT POSTERIOR AXILLA
LOCATION SIMPLE: LEFT SCALP
LOCATION SIMPLE: LEFT FOREARM

## 2021-10-13 NOTE — PROCEDURE: PRESCRIPTION MEDICATION MANAGEMENT
Initiate Treatment: clotrimazole-betamethasone 1 %-0.05 % topical cream: Apply to aa under the left arm and face qday
Detail Level: Zone
Render In Strict Bullet Format?: No

## 2021-10-13 NOTE — PROCEDURE: PREMALIGNANT DESTRUCTION
Render Post-Care In The Note: No
Post-Care Instructions: I reviewed with the patient in detail post-care instructions. Patient is to wear sunprotection, and avoid picking at any of the treated lesions. Pt may apply Vaseline to crusted or scabbing areas.
Consent: The patient's consent was obtained including but not limited to risks of crusting, scabbing, blistering, scarring, darker or lighter pigmentary change, recurrence, incomplete removal and infection.
Method: liquid nitrogen
Detail Level: Detailed
Anesthesia Volume In Cc: 0

## 2021-10-18 ENCOUNTER — HOSPITAL ENCOUNTER (OUTPATIENT)
Dept: CARDIOLOGY | Facility: CLINIC | Age: 81
Setting detail: NUCLEAR MEDICINE
Discharge: HOME | End: 2021-10-18
Attending: INTERNAL MEDICINE
Payer: MEDICARE

## 2021-10-18 ENCOUNTER — APPOINTMENT (OUTPATIENT)
Dept: CARDIOLOGY | Facility: CLINIC | Age: 81
Setting detail: NUCLEAR MEDICINE
End: 2021-10-18
Attending: INTERNAL MEDICINE
Payer: MEDICARE

## 2021-10-18 VITALS
DIASTOLIC BLOOD PRESSURE: 78 MMHG | BODY MASS INDEX: 40.75 KG/M2 | HEART RATE: 76 BPM | WEIGHT: 230 LBS | HEIGHT: 63 IN | SYSTOLIC BLOOD PRESSURE: 136 MMHG

## 2021-10-18 DIAGNOSIS — Z95.1 HISTORY OF CORONARY ARTERY BYPASS GRAFT: ICD-10-CM

## 2021-10-18 LAB
CV NM TETROFOSMIN REST DOSE: 10.1 MCI
CV NM TETROFOSMIN STRESS DOSE: 30.5 MCI
STRESS BASELINE BP: NORMAL MMHG
STRESS BASELINE HR: 53 BPM
STRESS ECHO POST RECOVERY HR: 90 BPM
STRESS PERCENT HR: 66 %
STRESS POST PEAK BP: NORMAL MMHG
STRESS POST PEAK HR: 91 BPM
STRESS TARGET HR: 116 BPM

## 2021-10-18 PROCEDURE — 93015 CV STRESS TEST SUPVJ I&R: CPT | Performed by: INTERNAL MEDICINE

## 2021-10-18 PROCEDURE — A9502 TC99M TETROFOSMIN: HCPCS | Performed by: INTERNAL MEDICINE

## 2021-10-18 PROCEDURE — 78452 HT MUSCLE IMAGE SPECT MULT: CPT | Performed by: INTERNAL MEDICINE

## 2021-10-18 RX ORDER — REGADENOSON 0.08 MG/ML
0.4 INJECTION, SOLUTION INTRAVENOUS ONCE
Status: COMPLETED | OUTPATIENT
Start: 2021-10-18 | End: 2021-10-18

## 2021-10-18 RX ADMIN — REGADENOSON 0.4 MG: 0.08 INJECTION, SOLUTION INTRAVENOUS at 10:19

## 2021-10-25 NOTE — PROGRESS NOTES
Cardiology Note    French Garcia DO William BAKARI Daniel is a 81 y.o. male 1940   He is here today with his son  He returns after pharmacologic stress test that is stable showing large scar no ischemia with ejection fraction 50% this was done October 2021,       HE HAS A  history of myocardial infarction coronary bypass surgery 2018  Left internal mammary LAD vein to diagonal vein to marginal    He has mild aortic stenosis echocardiogram today October 2021 showed  Mild aortic stenosis ejection fraction depressed 45 to 50% mild to moderate MR, stable findings  His son told me he wore some type of oximetry at night through your office and his O2 sat dropped and I told her to follow-up with you to see if he needs pulmonary evaluation  Suspect sleep apnea is likely diagnosis    He has Chronic renal sufficiency  Remote paroxysmal AF at the time of the surgery in 2018  Recently stopped smoking 2021        lab work June 2021 glucose 202  Creatinine 1.3  Potassium 3.9 hemoglobin A1c 9.2  No lipids  Thyroids normal  CBC normal                      v        Patient Active Problem List    Diagnosis Date Noted   • Obesity 11/01/2021   • CRI (chronic renal insufficiency) 02/03/2020   • Anemia 02/03/2020   • Nicotine dependence in remission 02/03/2020   • Malignant neoplasm of sigmoid colon (CMS/[facility]) 11/06/2018   • Iron deficiency anemia 08/02/2018   • PAF (paroxysmal atrial fibrillation) (CMS/[facility]) 08/02/2018   • History of coronary artery bypass graft 06/11/2018   • Nonrheumatic aortic valve stenosis 06/11/2018   • Acute ST elevation myocardial infarction (STEMI) (CMS/[facility]) 05/19/2018   • Controlled type 2 diabetes mellitus with complication, without long-term current use of insulin (CMS/[facility]) 05/18/2018   • Incarcerated hernia 05/18/2018   • Hypothyroidism 05/18/2018   • Hyperlipidemia 05/18/2018   • Nephrolithiasis 05/18/2018       Allergy  Patient has no known allergies.    MED LIST     Current Outpatient  "Medications   Medication Sig Dispense Refill   • acetaminophen (TYLENOL) 325 mg tablet 650 mg every 4 (four) hours as needed.       • albuterol HFA (VENTOLIN HFA) 90 mcg/actuation inhaler inhale 2 puffs by mouth every 6 hours     • aspirin 81 mg enteric coated tablet Take 81 mg by mouth daily.     • docusate sodium (COLACE) 100 mg capsule Take 100 mg by mouth once.     • hydrocortisone 2.5 % cream as needed.       • insulin aspart U-100 (NovoLOG) 100 unit/mL injection Inject under the skin 3 (three) times a day before meals. Pt takes 12 units TID, and an additional 400units as directed      • levothyroxine (SYNTHROID) 200 mcg tablet Take 200 mcg by mouth See admin instr. Along with 50mcg     • levothyroxine sodium (TIROSINT) 50 mcg capsule Take 50 mcg by mouth See admin instr. Along with 200mcg       • metoprolol succinate XL 25 mg 24 hr tablet Take 1 tablet (25 mg total) by mouth daily. 90 tablet 3   • rosuvastatin 40 mg tablet Take 1 tablet (40 mg total) by mouth daily. 90 tablet 3   • tamsulosin (FLOMAX) 0.4 mg capsule Take 0.4 mg by mouth once daily.     • TRESIBA FLEXTOUCH U-200 200 unit/mL (3 mL) pen        No current facility-administered medications for this visit.        ROS    Labs dec 2019  Hemoglobin A1c 8.8  Cholesterol LDL 68  Creatinine 1.34 potassium 5 hemoglobin 17  Thyroid studies normal                                                    Objective   Vitals:    11/01/21 1545   BP: 126/72   BP Location: Left upper arm   Patient Position: Sitting   Pulse: (!) 58   SpO2: 96%   Weight: 108 kg (238 lb)   Height: 1.6 m (5' 3\")     Physical Exam  Constitutional:       General: He is not in acute distress.     Appearance: Normal appearance. He is well-developed. He is not ill-appearing, toxic-appearing or diaphoretic.      Interventions: He is not intubated.     Comments: Overweight   HENT:      Head: Normocephalic. Not microcephalic. No raccoon eyes, abrasion or contusion.      Nose: Nose normal.   Eyes: "      General: No scleral icterus.        Left eye: No discharge.      Conjunctiva/sclera:      Right eye: Right conjunctiva is not injected.      Left eye: Left conjunctiva is not injected. No exudate or hemorrhage.     Pupils: Pupils are equal.   Neck:      Vascular: Normal carotid pulses. No carotid bruit or hepatojugular reflux.   Cardiovascular:      Rate and Rhythm: Normal rate and regular rhythm.      Chest Wall: PMI is not displaced.      Pulses: Normal pulses and intact distal pulses.      Heart sounds: Normal heart sounds. No murmur heard.  No friction rub. No gallop.       Comments: 1/6 systolic murmur  Pulmonary:      Effort: Pulmonary effort is normal. No tachypnea, bradypnea or respiratory distress. He is not intubated.      Breath sounds: Normal breath sounds. No stridor. No wheezing or rales.   Chest:      Chest wall: No tenderness.   Abdominal:      General: Bowel sounds are normal. There is no distension.      Palpations: Abdomen is soft.      Tenderness: There is no abdominal tenderness.   Musculoskeletal:         General: No deformity. Normal range of motion.      Cervical back: Normal range of motion and neck supple.   Skin:     Coloration: Skin is not pale.      Findings: No abrasion, bruising, ecchymosis, erythema or rash.   Neurological:      Mental Status: He is alert and oriented to person, place, and time.   Psychiatric:         Mood and Affect: Mood is not anxious or depressed. Affect is not labile, blunt, angry or inappropriate.         Speech: He is communicative. Speech is not slurred.         Behavior: Behavior is not agitated, aggressive, withdrawn or combative.         Cardiac Procedures  Cardiac Procedures  Cardiac Procedures        CATH  5/18/20 90% PROX LAD MID 70%, Om1 80%, INF BRANCH 95% RCA NON OBSTRUCTIVE DISEASE    STRESS  Pharm nuc June 2020  inf lat scar per inf ischem mild 45%              CV SURGERY     CABG x 4   5/18 AFTER NONSTEM limA LAD v TO DIAG, v Om1, v Om2  PERIO  AFIB         STRESS  Pharm stress testOCT 2021  .  The EKG does not meet evidence for pharmacologic induced ischemia.  2.  Abnormal nuclear perfusion scan.  Moderate to large inferior and inferior lateral defect which is fixed and consistent with scar in the distribution of the left circumflex coronary artery.  3.  Gated images: Moderate inferior lateral hypokinesis.  The left ventricular ejection fraction is 50%.  Compared to the pharmacologic nuclear stress test from an outside institution dated 7/15/2020: No significant change.         ECHO       July 2019 ef 50% inf apical akinetic ef 50% mild mr mild as mean gradient 9    august 2019 infer lateral hypokinetic ejection fraction 45% mild left ear mild MR mild aortic stenosis peak gradient 2 m/s    Echo nov 2021  Mild to mod mr as mean 10 peak 2.3 ava1.6 mild as  Inf lat akine af 45%       ELECTROPHYSIOLOGY       paf perio op cabg 5/18    EKG                  Nov 2021 inferolateral ST T wave inversions no change                                                              nsr on spec st seg inf lat t wave inversions flatteni t wave inversions noaug 2020 no hcange changeng     EKGsinus alysia 54 non spec st flattening ant lat t wave inversions no change  EKG    Assessment/Plan:  History of coronary artery bypass graft  2018 myocardial infarction followed by [facility] left internal mammary LAD vein to diagonal vein to first marginal marginal vein to second marginal echocardiogram November 2021 lateral akinesis ejection fraction 45% moderate mitral regurgitation mild aortic stenosis nuclear stress test November 2021 stable lateral scar no significant ischemia    Nonrheumatic aortic valve stenosis  Mild peak gradient 2.5 m/s November 2020 1 repeat echo 1 year    Controlled type 2 diabetes mellitus with complication, without long-term current use of insulin (CMS/[facility])  On insulin follows with Dr. Swenson last A1c I have from June was 9    CRI (chronic renal  insufficiency)  Creatinine from June 1.31    Nicotine dependence in remission  Quit 2021    Obesity  His son said he wore an oximetry at night and desaturated suspect he has some form of sleep apnea asked him to follow-up with you for pulmonary evaluation he does not know where this was done I suspect it was through your office most likely sleep apnea           History of CABG myocardial infarction moderately depressed ejection fraction ef of 45%  His son told me he had an oximetry unit at night and dropped O2 sats under 90% back he has sleep apnea will follow up with you for further studies and evaluation with pulmonary  I will see him in 6 months with lab work repeat echocardiogram 1 year  No change in cardiac medications                This letter was generated using speech recognition software.  Please excuse any typographical errors.  Fernie Rodriguez MD [facility]   11/1/2021  Cc French Garcia,

## 2021-11-01 ENCOUNTER — HOSPITAL ENCOUNTER (OUTPATIENT)
Dept: CARDIOLOGY | Facility: CLINIC | Age: 81
Discharge: HOME | End: 2021-11-01
Payer: MEDICARE

## 2021-11-01 ENCOUNTER — OFFICE VISIT (OUTPATIENT)
Dept: CARDIOLOGY | Facility: CLINIC | Age: 81
End: 2021-11-01
Payer: MEDICARE

## 2021-11-01 VITALS
BODY MASS INDEX: 42.17 KG/M2 | WEIGHT: 238 LBS | DIASTOLIC BLOOD PRESSURE: 72 MMHG | OXYGEN SATURATION: 96 % | HEART RATE: 58 BPM | SYSTOLIC BLOOD PRESSURE: 126 MMHG | HEIGHT: 63 IN

## 2021-11-01 VITALS
WEIGHT: 230 LBS | DIASTOLIC BLOOD PRESSURE: 80 MMHG | HEIGHT: 63 IN | SYSTOLIC BLOOD PRESSURE: 130 MMHG | BODY MASS INDEX: 40.75 KG/M2

## 2021-11-01 DIAGNOSIS — Z95.1 HISTORY OF CORONARY ARTERY BYPASS GRAFT: ICD-10-CM

## 2021-11-01 DIAGNOSIS — I35.0 NONRHEUMATIC AORTIC VALVE STENOSIS: ICD-10-CM

## 2021-11-01 DIAGNOSIS — N18.9 CHRONIC RENAL IMPAIRMENT, UNSPECIFIED CKD STAGE: Primary | ICD-10-CM

## 2021-11-01 DIAGNOSIS — F17.211 CIGARETTE NICOTINE DEPENDENCE IN REMISSION: ICD-10-CM

## 2021-11-01 DIAGNOSIS — E11.8 CONTROLLED TYPE 2 DIABETES MELLITUS WITH COMPLICATION, WITHOUT LONG-TERM CURRENT USE OF INSULIN (CMS/HCC): ICD-10-CM

## 2021-11-01 PROBLEM — E66.9 OBESITY: Status: ACTIVE | Noted: 2021-11-01

## 2021-11-01 LAB
AORTIC ROOT ANNULUS: 3.7 CM
AORTIC VALVE MEAN VELOCITY: 1.63 M/S
AORTIC VALVE VELOCITY TIME INTEGRAL: 60.5 CM
ASCENDING AORTA: 2.8 CM
AV MEAN GRADIENT: 12 MMHG
AV PEAK GRADIENT: 21 MMHG
AV PEAK VELOCITY-S: 2.31 M/S
AV VALVE AREA: 1.52 CM2
BSA FOR ECHO PROCEDURE: 2.15 M2
DOP CALC LVOT STROKE VOLUME: 91.69 ML
E WAVE DECELERATION TIME: 313 MS
E/A RATIO: 0.9
E/E' RATIO: 19.6
E/LAT E' RATIO: 19.6
EDV (BP): 62.6 CM3
EF (A4C): 47.6 %
EF A2C: 49.4 %
EJECTION FRACTION: 50.5 %
ESV (BP): 31 CM3
FRACTIONAL SHORTENING: 22.4 %
INTERVENTRICULAR SEPTUM: 1.14 CM
LA ESV (BP): 51.9 CM3
LA ESV INDEX (A2C): 17.4 CM3/M2
LA ESV INDEX (BP): 24.14 CM3/M2
LA/AORTA RATIO: 0.73
LAAS-AP2: 16.4 CM2
LAAS-AP4: 23.4 CM2
LAD 2D: 2.7 CM
LALD A4C: 5.73 CM
LALD A4C: 6.91 CM
LAV-S: 37.4 CM3
LEFT ATRIUM VOLUME INDEX: 28 CM3/M2
LEFT ATRIUM VOLUME: 60.2 CM3
LEFT INTERNAL DIMENSION IN SYSTOLE: 3.4 CM (ref 3.06–4.64)
LEFT VENTRICLE DIASTOLIC VOLUME INDEX: 28.51 CM3/M2
LEFT VENTRICLE DIASTOLIC VOLUME: 61.3 CM3
LEFT VENTRICLE SYSTOLIC VOLUME INDEX: 14.98 CM3/M2
LEFT VENTRICLE SYSTOLIC VOLUME: 32.2 CM3
LEFT VENTRICULAR INTERNAL DIMENSION IN DIASTOLE: 4.38 CM (ref 5.22–7.25)
LEFT VENTRICULAR POSTERIOR WALL IN END DIASTOLE: 0.79 CM (ref 0.67–1.25)
LV DIASTOLIC VOLUME: 59.4 CM3
LV ESV (APICAL 2 CHAMBER): 30 CM3
LVAD-AP2: 22.5 CM2
LVAD-AP4: 22.1 CM2
LVAS-AP2: 14.2 CM2
LVAS-AP4: 15.3 CM2
LVEDVI(A2C): 27.63 CM3/M2
LVEDVI(BP): 29.12 CM3/M2
LVESVI(A2C): 13.95 CM3/M2
LVESVI(BP): 14.42 CM3/M2
LVLD-AP2: 7.27 CM
LVLD-AP4: 6.68 CM
LVLS-AP2: 6.08 CM
LVLS-AP4: 5.91 CM
LVOT 2D: 2 CM
LVOT A: 3.14 CM2
LVOT MG: 3 MMHG
LVOT MV: 0.8 M/S
LVOT PEAK VELOCITY: 1.14 M/S
LVOT VTI: 29.2 CM
MV E'TISSUE VEL-LAT: 0.06 M/S
MV E'TISSUE VEL-MED: 0.06 M/S
MV PEAK A VEL: 1.29 M/S
MV PEAK E VEL: 1.12 M/S
POSTERIOR WALL: 0.79 CM
PULMONARY REGURGITATION LATE DIASTOLIC VELOCITY: 0.93 M/S
PV PEAK GRADIENT: 6 MMHG
PV PV: 1.19 M/S
RVOT VMAX: 0.97 M/S
RVOT VTI: 21.1 CM
SEPTAL TISSUE DOPPLER FREE WALL LATE DIA VELOCITY (APICAL 4 CHAMBER VIEW): 0.9 M/S
Z-SCORE OF LEFT VENTRICULAR DIMENSION IN END DIASTOLE: -3.39
Z-SCORE OF LEFT VENTRICULAR DIMENSION IN END SYSTOLE: -0.81
Z-SCORE OF LEFT VENTRICULAR POSTERIOR WALL IN END DIASTOLE: -0.75

## 2021-11-01 PROCEDURE — 99214 OFFICE O/P EST MOD 30 MIN: CPT | Performed by: INTERNAL MEDICINE

## 2021-11-01 PROCEDURE — 93000 ELECTROCARDIOGRAM COMPLETE: CPT | Performed by: INTERNAL MEDICINE

## 2021-11-01 PROCEDURE — 93306 TTE W/DOPPLER COMPLETE: CPT | Performed by: INTERNAL MEDICINE

## 2021-11-01 RX ORDER — METOPROLOL SUCCINATE 25 MG/1
25 TABLET, EXTENDED RELEASE ORAL DAILY
Qty: 90 TABLET | Refills: 3 | Status: SHIPPED | OUTPATIENT
Start: 2021-11-01 | End: 2022-02-25 | Stop reason: SDUPTHER

## 2021-11-01 RX ORDER — ROSUVASTATIN CALCIUM 40 MG/1
40 TABLET, COATED ORAL DAILY
Qty: 90 TABLET | Refills: 3 | Status: SHIPPED | OUTPATIENT
Start: 2021-11-01 | End: 2022-07-14 | Stop reason: SDUPTHER

## 2021-11-01 RX ORDER — ROSUVASTATIN CALCIUM 40 MG/1
40 TABLET, COATED ORAL DAILY
Qty: 90 TABLET | Refills: 3 | Status: SHIPPED | OUTPATIENT
Start: 2021-11-01 | End: 2021-11-01 | Stop reason: SDUPTHER

## 2021-11-01 ASSESSMENT — PAIN SCALES - GENERAL: PAINLEVEL: 0-NO PAIN

## 2021-11-01 NOTE — ASSESSMENT & PLAN NOTE
His son said he wore an oximetry at night and desaturated suspect he has some form of sleep apnea asked him to follow-up with you for pulmonary evaluation he does not know where this was done I suspect it was through your office most likely sleep apnea

## 2021-11-01 NOTE — ASSESSMENT & PLAN NOTE
2018 myocardial infarction followed by [facility] left internal mammary LAD vein to diagonal vein to first marginal marginal vein to second marginal echocardiogram November 2021 lateral akinesis ejection fraction 45% moderate mitral regurgitation mild aortic stenosis nuclear stress test November 2021 stable lateral scar no significant ischemia

## 2022-02-25 RX ORDER — METOPROLOL SUCCINATE 25 MG/1
25 TABLET, EXTENDED RELEASE ORAL DAILY
Qty: 90 TABLET | Refills: 3 | Status: SHIPPED | OUTPATIENT
Start: 2022-02-25 | End: 2022-07-14 | Stop reason: SDUPTHER

## 2022-04-13 ENCOUNTER — APPOINTMENT (RX ONLY)
Dept: URBAN - METROPOLITAN AREA CLINIC 374 | Facility: CLINIC | Age: 82
Setting detail: DERMATOLOGY
End: 2022-04-13

## 2022-04-13 DIAGNOSIS — L57.0 ACTINIC KERATOSIS: ICD-10-CM

## 2022-04-13 DIAGNOSIS — L21.8 OTHER SEBORRHEIC DERMATITIS: ICD-10-CM

## 2022-04-13 PROCEDURE — 99213 OFFICE O/P EST LOW 20 MIN: CPT | Mod: 25

## 2022-04-13 PROCEDURE — 17004 DESTROY PREMAL LESIONS 15/>: CPT

## 2022-04-13 PROCEDURE — ? PREMALIGNANT DESTRUCTION

## 2022-04-13 PROCEDURE — ? PRESCRIPTION MEDICATION MANAGEMENT

## 2022-04-13 PROCEDURE — ? ADDITIONAL NOTES

## 2022-04-13 ASSESSMENT — LOCATION ZONE DERM
LOCATION ZONE: ARM
LOCATION ZONE: SCALP
LOCATION ZONE: FACE
LOCATION ZONE: AXILLAE
LOCATION ZONE: FINGER
LOCATION ZONE: HAND

## 2022-04-13 ASSESSMENT — LOCATION SIMPLE DESCRIPTION DERM
LOCATION SIMPLE: RIGHT FOREARM
LOCATION SIMPLE: LEFT MIDDLE FINGER
LOCATION SIMPLE: RIGHT SCALP
LOCATION SIMPLE: LEFT HAND
LOCATION SIMPLE: RIGHT TEMPLE
LOCATION SIMPLE: RIGHT ELBOW
LOCATION SIMPLE: LEFT POSTERIOR AXILLA
LOCATION SIMPLE: RIGHT FOREHEAD
LOCATION SIMPLE: LEFT FOREARM

## 2022-04-13 ASSESSMENT — LOCATION DETAILED DESCRIPTION DERM
LOCATION DETAILED: LEFT VENTRAL DISTAL FOREARM
LOCATION DETAILED: RIGHT ELBOW
LOCATION DETAILED: LEFT PROXIMAL RADIAL DORSAL FOREARM
LOCATION DETAILED: RIGHT LATERAL FRONTAL SCALP
LOCATION DETAILED: RIGHT SUPERIOR FOREHEAD
LOCATION DETAILED: RIGHT FOREHEAD
LOCATION DETAILED: RIGHT DISTAL DORSAL FOREARM
LOCATION DETAILED: RIGHT CENTRAL TEMPLE
LOCATION DETAILED: LEFT PROXIMAL DORSAL MIDDLE FINGER
LOCATION DETAILED: LEFT POSTERIOR AXILLA
LOCATION DETAILED: RIGHT PROXIMAL DORSAL FOREARM
LOCATION DETAILED: LEFT DISTAL DORSAL FOREARM
LOCATION DETAILED: LEFT RADIAL DORSAL HAND
LOCATION DETAILED: RIGHT VENTRAL DISTAL FOREARM
LOCATION DETAILED: LEFT VENTRAL PROXIMAL FOREARM

## 2022-04-13 NOTE — PROCEDURE: PRESCRIPTION MEDICATION MANAGEMENT
Continue Regimen: clotrimazole-betamethasone 1 %-0.05 % topical cream: Apply to aa under the left arm and face PRN
Detail Level: Zone
Render In Strict Bullet Format?: No

## 2022-07-11 NOTE — PROGRESS NOTES
Cardiology Note    French Garcia DO          Maximus Daniel is a 82 y.o. male 1940 ;    He returns to review his lab work he is here today with his son  History of myocardial infarction followed by bypass surgery 2018  CABG x4 with LIMA to LAD vein graft to diagonal and to marginal  His last ischemic evaluation October 2021 inferior lateral scar no ischemia ejection fraction 50%  He has mild aortic stenosis last imaged November 2021  Chronic renal sufficiency  Recently quit smoking 2021 November diabetes  And he is overweight  He is newly on Jardiance only uses nocturnal oxygen          Lab work May 2022  CBC normal glucose 248  Creatinine 1.35  Potassium 3.9    Hemoglobin A1c 10.3      Lab work November 2021  Creatinine 1.25  Potassium 3.8  Cholesterol LDL 63 triglycerides 165  Glucose 212  CBC normal  bnp 37                        Patient Active Problem List    Diagnosis Date Noted   • Obesity 11/01/2021   • CRI (chronic renal insufficiency) 02/03/2020   • Anemia 02/03/2020   • Nicotine dependence in remission 02/03/2020   • Malignant neoplasm of sigmoid colon (CMS/[facility]) 11/06/2018   • Iron deficiency anemia 08/02/2018   • PAF (paroxysmal atrial fibrillation) (CMS/[facility]) 08/02/2018   • History of coronary artery bypass graft 06/11/2018   • Nonrheumatic aortic valve stenosis 06/11/2018   • Acute ST elevation myocardial infarction (STEMI) (CMS/[facility]) 05/19/2018   • Controlled type 2 diabetes mellitus with complication, without long-term current use of insulin (CMS/[facility]) 05/18/2018   • Incarcerated hernia 05/18/2018   • Hypothyroidism 05/18/2018   • Hyperlipidemia 05/18/2018   • Nephrolithiasis 05/18/2018       Allergy  Patient has no known allergies.    MED LIST     Current Outpatient Medications   Medication Sig Dispense Refill   • metoprolol succinate XL (TOPROL-XL) 25 mg 24 hr tablet Take 1 tablet (25 mg total) by mouth daily. 90 tablet 3   • rosuvastatin (CRESTOR) 40 mg tablet Take 1 tablet (40 mg  "total) by mouth daily. 90 tablet 3   • acetaminophen (TYLENOL) 325 mg tablet 650 mg every 4 (four) hours as needed.       • albuterol HFA (VENTOLIN HFA) 90 mcg/actuation inhaler inhale 2 puffs by mouth every 6 hours     • aspirin 81 mg enteric coated tablet Take 81 mg by mouth daily.     • BD ULTRA-FINE MINI PEN NEEDLE 31 gauge x 3/16\" needle use 4 daily     • docusate sodium (COLACE) 100 mg capsule Take 100 mg by mouth once.     • hydrocortisone 2.5 % cream as needed.       • insulin aspart U-100 (NovoLOG) 100 unit/mL injection Inject under the skin 3 (three) times a day before meals. Pt takes 12 units TID, and an additional 400units as directed      • JARDIANCE 25 mg tablet Take 25 mg by mouth once daily.     • levothyroxine (SYNTHROID) 200 mcg tablet Take 200 mcg by mouth See admin instr. Along with 50mcg     • levothyroxine sodium (TIROSINT) 50 mcg capsule Take 50 mcg by mouth See admin instr. Along with 200mcg       • tamsulosin (FLOMAX) 0.4 mg capsule Take 0.4 mg by mouth once daily.     • TRESIBA FLEXTOUCH U-200 200 unit/mL (3 mL) pen      • triamcinolone (KENALOG) 0.1 % cream APPLY two times a day as needed FOR ANKLE PRURITIS       No current facility-administered medications for this visit.        ROS    Labs dec 2019  Hemoglobin A1c 8.8  Cholesterol LDL 68  Creatinine 1.34 potassium 5 hemoglobin 17  Thyroid studies normal                                                    Objective   Vitals:    07/14/22 0854   BP: (!) 142/86   BP Location: Right upper arm   Patient Position: Sitting   Pulse: 84   Resp: 16   SpO2: 93%   Weight: 124 kg (274 lb)     Physical Exam  Constitutional:       General: He is not in acute distress.     Appearance: He is well-developed. He is obese. He is not ill-appearing, toxic-appearing or diaphoretic.      Interventions: He is not intubated.     Comments: Overweight   HENT:      Head: Normocephalic. Not microcephalic. No raccoon eyes, abrasion or contusion.      Nose: Nose normal. "   Eyes:      General: No scleral icterus.        Left eye: No discharge.      Conjunctiva/sclera:      Right eye: Right conjunctiva is not injected.      Left eye: Left conjunctiva is not injected. No exudate or hemorrhage.     Pupils: Pupils are equal.   Neck:      Vascular: Normal carotid pulses. No carotid bruit or hepatojugular reflux.   Cardiovascular:      Rate and Rhythm: Normal rate and regular rhythm.      Chest Wall: PMI is not displaced.      Pulses: Normal pulses and intact distal pulses.      Heart sounds: Normal heart sounds. No murmur heard.    No friction rub. No gallop.      Comments: 1/6 systolic murmur  Pulmonary:      Effort: Pulmonary effort is normal. No tachypnea, bradypnea or respiratory distress. He is not intubated.      Breath sounds: Normal breath sounds. No stridor. No wheezing or rales.   Chest:      Chest wall: No tenderness.   Abdominal:      General: Bowel sounds are normal. There is no distension.      Palpations: Abdomen is soft.      Tenderness: There is no abdominal tenderness.   Musculoskeletal:         General: No deformity. Normal range of motion.      Cervical back: Normal range of motion and neck supple.   Skin:     Coloration: Skin is not pale.      Findings: No abrasion, bruising, ecchymosis, erythema or rash.   Neurological:      Mental Status: He is alert and oriented to person, place, and time.   Psychiatric:         Mood and Affect: Mood is not anxious or depressed. Affect is not labile, blunt, angry or inappropriate.         Speech: He is communicative. Speech is not slurred.         Behavior: Behavior is not agitated, aggressive, withdrawn or combative.         Cardiac Procedures  Cardiac Procedures  Cardiac Procedures        CATH  5/18/20 90% PROX LAD MID 70%, Om1 80%, INF BRANCH 95% RCA NON OBSTRUCTIVE DISEASE    STRESS    Pharm nuc oct 2021  The EKG does not meet evidence for pharmacologic induced ischemia.  2.  Abnormal nuclear perfusion scan.  Moderate to large  inferior and inferior lateral defect which is fixed and consistent with scar in the distribution of the left circumflex coronary artery.  3.  Gated images: Moderate inferior lateral hypokinesis.  The left ventricular ejection fraction is 50%.  Compared to the pharmacologic nuclear stress test from an outside institution dated 7/15/2020: No significant change            CV SURGERY     CABG x 4   5/18 AFTER NONSTEM limA LAD v TO DIAG, v Om1, v Om2  PERIO AFIB         STRESS  Pharm stress testOCT 2021  .  The EKG does not meet evidence for pharmacologic induced ischemia.  2.  Abnormal nuclear perfusion scan.  Moderate to large inferior and inferior lateral defect which is fixed and consistent with scar in the distribution of the left circumflex coronary artery.  3.  Gated images: Moderate inferior lateral hypokinesis.  The left ventricular ejection fraction is 50%.  Compared to the pharmacologic nuclear stress test from an outside institution dated 7/15/2020: No significant change.         ECHO       July 2019 ef 50% inf apical akinetic ef 50% mild mr mild as mean gradient 9    august 2019 infer lateral hypokinetic ejection fraction 45% mild left ear mild MR mild aortic stenosis peak gradient 2 m/s    Echo nov 2021  Mild to mod mr as mean 10 peak 2.3 ava1.6 mild as  Inf lat akine af 45%       ELECTROPHYSIOLOGY       paf perio op cabg 5/18    EKG            July 2022 inferolateral T wave inversions unchanged      Nov 2021 inferolateral ST T wave inversions no change                                                              nsr on spec st seg inf lat t wave inversions flatteni t wave inversions noaug 2020 no hcange changeng     EKGsinus alysia 54 non spec st flattening ant lat t wave inversions no change  EKG    Assessment/Plan:  History of coronary artery bypass graft  May 2018 after inferior myocardial infarction underwent bypass surgery  Left internal mammary LAD, vein to diagonal, vein first marginal vein second  marginal  Echocardiogram November 2021 lateral akinesis ejection fraction 45% mild aortic stenosis  Last ischemic evaluation nuclear stress test November 2021 stable lateral scar no ischemia for echo next visit    Nonrheumatic aortic valve stenosis  Mild aortic stenosis mean gradient 10 aortic valve area 1.6 cm² ejection fraction 45% inferolateral akinesis echo next visit last echo November 2021    Nicotine dependence in remission  Quit 2021    CRI (chronic renal insufficiency)  Creatinine 1.35 potassium 3.9    Controlled type 2 diabetes mellitus with complication, without long-term current use of insulin (CMS/[facility])  On insulin recently added Jardiance follows with endocrinology    Obesity  Hypoventilation nocturnal desaturation: Oxygen at night           History of CABG myocardial infarction moderately depressed ejection fraction ef of 45% mild aortic stenosis  His son told me he had an oximetry unit at night and dropped O2 sats under 90% back he has sleep apnea he was now on oxygen at night  He is newly on Jardiance follows with endocrinology  I made no change in cardiac medications  Follow-up in 6 months with lab work and echocardiogram            This letter was generated using speech recognition software.  Please excuse any typographical errors.  Fernie Rodriguez MD [facility]   7/14/2022  Cc French Garcia DO

## 2022-07-14 ENCOUNTER — OFFICE VISIT (OUTPATIENT)
Dept: CARDIOLOGY | Facility: CLINIC | Age: 82
End: 2022-07-14
Payer: MEDICARE

## 2022-07-14 VITALS
RESPIRATION RATE: 16 BRPM | OXYGEN SATURATION: 93 % | HEART RATE: 84 BPM | BODY MASS INDEX: 48.54 KG/M2 | DIASTOLIC BLOOD PRESSURE: 86 MMHG | SYSTOLIC BLOOD PRESSURE: 142 MMHG | WEIGHT: 274 LBS

## 2022-07-14 DIAGNOSIS — N18.9 CHRONIC RENAL IMPAIRMENT, UNSPECIFIED CKD STAGE: Primary | ICD-10-CM

## 2022-07-14 DIAGNOSIS — Z95.1 HISTORY OF CORONARY ARTERY BYPASS GRAFT: ICD-10-CM

## 2022-07-14 DIAGNOSIS — E11.8 CONTROLLED TYPE 2 DIABETES MELLITUS WITH COMPLICATION, WITHOUT LONG-TERM CURRENT USE OF INSULIN (CMS/HCC): ICD-10-CM

## 2022-07-14 PROCEDURE — 99214 OFFICE O/P EST MOD 30 MIN: CPT | Performed by: INTERNAL MEDICINE

## 2022-07-14 PROCEDURE — 93000 ELECTROCARDIOGRAM COMPLETE: CPT | Performed by: INTERNAL MEDICINE

## 2022-07-14 RX ORDER — EMPAGLIFLOZIN 25 MG/1
25 TABLET, FILM COATED ORAL
COMMUNITY
Start: 2022-06-14

## 2022-07-14 RX ORDER — TRIAMCINOLONE ACETONIDE 1 MG/G
CREAM TOPICAL
COMMUNITY
Start: 2022-04-29

## 2022-07-14 RX ORDER — METOPROLOL SUCCINATE 25 MG/1
25 TABLET, EXTENDED RELEASE ORAL DAILY
Qty: 90 TABLET | Refills: 3 | Status: SHIPPED | OUTPATIENT
Start: 2022-07-14 | End: 2022-10-19 | Stop reason: SDUPTHER

## 2022-07-14 RX ORDER — ROSUVASTATIN CALCIUM 40 MG/1
40 TABLET, COATED ORAL DAILY
Qty: 90 TABLET | Refills: 3 | Status: SHIPPED | OUTPATIENT
Start: 2022-07-14 | End: 2022-10-19 | Stop reason: SDUPTHER

## 2022-07-14 RX ORDER — PEN NEEDLE, DIABETIC 31 GX5/16"
NEEDLE, DISPOSABLE MISCELLANEOUS
COMMUNITY
Start: 2022-05-22

## 2022-07-14 NOTE — ASSESSMENT & PLAN NOTE
Mild aortic stenosis mean gradient 10 aortic valve area 1.6 cm² ejection fraction 45% inferolateral akinesis echo next visit last echo November 2021

## 2022-07-14 NOTE — ASSESSMENT & PLAN NOTE
May 2018 after inferior myocardial infarction underwent bypass surgery  Left internal mammary LAD, vein to diagonal, vein first marginal vein second marginal  Echocardiogram November 2021 lateral akinesis ejection fraction 45% mild aortic stenosis  Last ischemic evaluation nuclear stress test November 2021 stable lateral scar no ischemia for echo next visit

## 2022-10-12 ENCOUNTER — APPOINTMENT (RX ONLY)
Dept: URBAN - METROPOLITAN AREA CLINIC 374 | Facility: CLINIC | Age: 82
Setting detail: DERMATOLOGY
End: 2022-10-12

## 2022-10-12 DIAGNOSIS — L57.0 ACTINIC KERATOSIS: ICD-10-CM

## 2022-10-12 PROCEDURE — 17003 DESTRUCT PREMALG LES 2-14: CPT

## 2022-10-12 PROCEDURE — 17000 DESTRUCT PREMALG LESION: CPT

## 2022-10-12 PROCEDURE — ? PREMALIGNANT DESTRUCTION

## 2022-10-12 PROCEDURE — ? ADDITIONAL NOTES

## 2022-10-12 ASSESSMENT — LOCATION ZONE DERM
LOCATION ZONE: ARM
LOCATION ZONE: EAR
LOCATION ZONE: SCALP
LOCATION ZONE: FACE

## 2022-10-12 ASSESSMENT — LOCATION SIMPLE DESCRIPTION DERM
LOCATION SIMPLE: RIGHT EAR
LOCATION SIMPLE: RIGHT FOREARM
LOCATION SIMPLE: RIGHT FOREHEAD
LOCATION SIMPLE: RIGHT SCALP

## 2022-10-12 ASSESSMENT — LOCATION DETAILED DESCRIPTION DERM
LOCATION DETAILED: RIGHT TRIANGULAR FOSSA
LOCATION DETAILED: RIGHT PROXIMAL DORSAL FOREARM
LOCATION DETAILED: RIGHT LATERAL FRONTAL SCALP
LOCATION DETAILED: RIGHT DISTAL DORSAL FOREARM
LOCATION DETAILED: RIGHT FOREHEAD

## 2022-10-18 ENCOUNTER — TELEPHONE (OUTPATIENT)
Dept: SCHEDULING | Facility: CLINIC | Age: 82
End: 2022-10-18
Payer: MEDICARE

## 2022-10-18 NOTE — TELEPHONE ENCOUNTER
"Medicine Refill Request    Last Office: Visit date not found   Last Consult Visit: Visit date not found  Last Telemedicine Visit: Visit date not found    Next Appointment: Visit date not found    Pt's daughter Gina is requesting refills of all cardiac meds.     Requesting 90 day supplies w/ refills.     Pt uses [id number].     Gina can be reached at [phone].      Current Outpatient Medications:     acetaminophen (TYLENOL) 325 mg tablet, 650 mg every 4 (four) hours as needed.  , Disp: , Rfl:     albuterol HFA (VENTOLIN HFA) 90 mcg/actuation inhaler, inhale 2 puffs by mouth every 6 hours, Disp: , Rfl:     aspirin 81 mg enteric coated tablet, Take 81 mg by mouth daily., Disp: , Rfl:     BD ULTRA-FINE MINI PEN NEEDLE 31 gauge x 3/16\" needle, use 4 daily, Disp: , Rfl:     docusate sodium (COLACE) 100 mg capsule, Take 100 mg by mouth once., Disp: , Rfl:     hydrocortisone 2.5 % cream, as needed.  , Disp: , Rfl:     insulin aspart U-100 (NovoLOG) 100 unit/mL injection, Inject under the skin 3 (three) times a day before meals. Pt takes 12 units TID, and an additional 400units as directed , Disp: , Rfl:     JARDIANCE 25 mg tablet, Take 25 mg by mouth once daily., Disp: , Rfl:     levothyroxine (SYNTHROID) 200 mcg tablet, Take 200 mcg by mouth See admin instr. Along with 50mcg, Disp: , Rfl:     levothyroxine sodium (TIROSINT) 50 mcg capsule, Take 50 mcg by mouth See admin instr. Along with 200mcg  , Disp: , Rfl:     metoprolol succinate XL (TOPROL-XL) 25 mg 24 hr tablet, Take 1 tablet (25 mg total) by mouth daily., Disp: 90 tablet, Rfl: 3    rosuvastatin (CRESTOR) 40 mg tablet, Take 1 tablet (40 mg total) by mouth daily., Disp: 90 tablet, Rfl: 3    tamsulosin (FLOMAX) 0.4 mg capsule, Take 0.4 mg by mouth once daily., Disp: , Rfl:     TRESIBA FLEXTOUCH U-200 200 unit/mL (3 mL) pen, , Disp: , Rfl:     triamcinolone (KENALOG) 0.1 % cream, APPLY two times a day as needed FOR ANKLE PRURITIS, Disp: " , Rfl:       BP Readings from Last 3 Encounters:   07/14/22 (!) 142/86   11/01/21 130/80   11/01/21 126/72       Recent Lab results:  Lab Results   Component Value Date    CHOL 152 05/19/2018   ,   Lab Results   Component Value Date    HDL 29 (L) 05/19/2018   ,   Lab Results   Component Value Date    LDLCALC 103 (H) 05/19/2018   ,   Lab Results   Component Value Date    TRIG 101 05/19/2018        Lab Results   Component Value Date    GLUCOSE 94 05/26/2018   ,   Lab Results   Component Value Date    HGBA1C 10.5 (H) 05/19/2018         Lab Results   Component Value Date    CREATININE 1.3 05/26/2018       Lab Results   Component Value Date    TSH 15.64 (H) 09/13/2019

## 2022-10-18 NOTE — TELEPHONE ENCOUNTER
Called and spoke with daughter Gina.  Dr. Rodriguez recommends booster.  She understands.  No questions.

## 2022-10-18 NOTE — TELEPHONE ENCOUNTER
Pt's daughter Gina is asking if Dr. Rodriguez would recommend that pt get 5th booster  Bivalent or wait until it's approved?    Gina can be reached at [phone].     Ty.

## 2022-10-19 RX ORDER — METOPROLOL SUCCINATE 25 MG/1
25 TABLET, EXTENDED RELEASE ORAL DAILY
Qty: 90 TABLET | Refills: 3 | Status: SHIPPED | OUTPATIENT
Start: 2022-10-19 | End: 2023-10-05 | Stop reason: SDUPTHER

## 2022-10-19 RX ORDER — ROSUVASTATIN CALCIUM 40 MG/1
40 TABLET, COATED ORAL DAILY
Qty: 90 TABLET | Refills: 3 | Status: SHIPPED | OUTPATIENT
Start: 2022-10-19 | End: 2023-10-05 | Stop reason: SDUPTHER

## 2023-02-12 NOTE — PROGRESS NOTES
Cardiology Note    French Garcia DO          Maximus Daniel is a 82 y.o. male 1940 ;  He is here today with his son  He returns for follow-up and echocardiogram.  He has a history of coronary bypass surgery after inferior myocardial infarction in 2018  Left internal mammary LAD vein diagonal vein to first marginal vein to second marginal  He has mildly reduced ejection fraction echocardiogram today February 2023 showed inf septal inferolateral hypokinesis ef 45-50% mild as peak gradeint 2.8 m sec mild mr  Last nuclear stress test November 2021 lateral scar no ischemia   he has mild aortic stenosis no significant change with echo today February 2023 there certainly could be a component of diastolic heart failure I do not have a recent BNP I talked to him about a trial of diuretic therapy but he declined  Quit smoking 2021  Renal insufficiency  Diabetes   obesity with hypoventilation syndrome  Chronic history of shortness of breath with exertion      Lab work December 2022  Glucose 237  Creatinine 1.27 potassium 4.4 triglycerides 247  LDL cholesterol 54  Hemoglobin A1c 8.8 down from 11  I do not have a recent CBC  Or bnp  If BNP was elevated we will give him a trial of diuretic therapy                     Patient Active Problem List    Diagnosis Date Noted   • SOB (shortness of breath) 02/21/2023   • Obesity 11/01/2021   • CRI (chronic renal insufficiency) 02/03/2020   • Anemia 02/03/2020   • Nicotine dependence in remission 02/03/2020   • Malignant neoplasm of sigmoid colon (CMS/[facility]ly [facility]) 11/06/2018   • Iron deficiency anemia 08/02/2018   • PAF (paroxysmal atrial fibrillation) (CMS/[facility]) 08/02/2018   • History of coronary artery bypass graft 06/11/2018   • Nonrheumatic aortic valve stenosis 06/11/2018   • Acute ST elevation myocardial infarction (STEMI) (CMS/[facility]) 05/19/2018   • Controlled type 2 diabetes mellitus with complication, without long-term current use of insulin (CMS/[facility]) 05/18/2018   •  "Incarcerated hernia 05/18/2018   • Hypothyroidism 05/18/2018   • Hyperlipidemia 05/18/2018   • Nephrolithiasis 05/18/2018       Allergy  Patient has no known allergies.    MED LIST     Current Outpatient Medications   Medication Sig Dispense Refill   • acetaminophen (TYLENOL) 325 mg tablet 650 mg every 4 (four) hours as needed.       • albuterol HFA (VENTOLIN HFA) 90 mcg/actuation inhaler inhale 2 puffs by mouth every 6 hours     • aspirin 81 mg enteric coated tablet Take 81 mg by mouth daily.     • BD ULTRA-FINE MINI PEN NEEDLE 31 gauge x 3/16\" needle use 4 daily     • docusate sodium (COLACE) 100 mg capsule Take 100 mg by mouth once.     • hydrocortisone 2.5 % cream as needed.       • insulin aspart U-100 (NovoLOG) 100 unit/mL injection Inject under the skin 3 (three) times a day before meals. Pt takes 16 units TID, and an additional 400units as directed     • JARDIANCE 25 mg tablet Take 25 mg by mouth once daily.     • levothyroxine (SYNTHROID) 200 mcg tablet Take 200 mcg by mouth See admin instr.     • levothyroxine sodium (TIROSINT) 50 mcg capsule Take 50 mcg by mouth See admin instr. Along with 200mcg       • metoprolol succinate XL (TOPROL-XL) 25 mg 24 hr tablet Take 1 tablet (25 mg total) by mouth daily. 90 tablet 3   • rosuvastatin (CRESTOR) 40 mg tablet Take 1 tablet (40 mg total) by mouth daily. 90 tablet 3   • tamsulosin (FLOMAX) 0.4 mg capsule Take 0.4 mg by mouth once daily.     • TRESIBA FLEXTOUCH U-200 200 unit/mL (3 mL) pen      • triamcinolone (KENALOG) 0.1 % cream APPLY two times a day as needed FOR ANKLE PRURITIS       No current facility-administered medications for this visit.        ROS    Labs dec 2019  Hemoglobin A1c 8.8  Cholesterol LDL 68  Creatinine 1.34 potassium 5 hemoglobin 17  Thyroid studies normal                                                    Objective   Vitals:    02/21/23 1021   BP: (!) 146/66   BP Location: Left upper arm   Patient Position: Sitting   Pulse: 67   Resp: 18 " "  SpO2: 96%   Weight: 107 kg (236 lb)   Height: 1.6 m (5' 3\")     Physical Exam  Constitutional:       General: He is not in acute distress.     Appearance: He is well-developed. He is obese. He is not ill-appearing, toxic-appearing or diaphoretic.      Interventions: He is not intubated.     Comments: Overweight   HENT:      Head: Normocephalic. Not microcephalic. No raccoon eyes, abrasion or contusion.      Nose: Nose normal.   Eyes:      General: No scleral icterus.        Left eye: No discharge.      Conjunctiva/sclera:      Right eye: Right conjunctiva is not injected.      Left eye: Left conjunctiva is not injected. No exudate or hemorrhage.     Pupils: Pupils are equal.   Neck:      Vascular: Normal carotid pulses. No carotid bruit or hepatojugular reflux.   Cardiovascular:      Rate and Rhythm: Normal rate and regular rhythm.      Chest Wall: PMI is not displaced.      Pulses: Normal pulses and intact distal pulses.      Heart sounds: Normal heart sounds. No murmur heard.    No friction rub. No gallop.      Comments: 1/6 systolic murmur  Pulmonary:      Effort: Pulmonary effort is normal. No tachypnea, bradypnea or respiratory distress. He is not intubated.      Breath sounds: Normal breath sounds. No stridor. No wheezing or rales.   Chest:      Chest wall: No tenderness.   Abdominal:      General: Bowel sounds are normal. There is no distension.      Palpations: Abdomen is soft.      Tenderness: There is no abdominal tenderness.   Musculoskeletal:         General: No deformity. Normal range of motion.      Cervical back: Normal range of motion and neck supple.   Skin:     Coloration: Skin is not pale.      Findings: No abrasion, bruising, ecchymosis, erythema or rash.   Neurological:      Mental Status: He is alert and oriented to person, place, and time.   Psychiatric:         Mood and Affect: Mood is not anxious or depressed. Affect is not labile, blunt, angry or inappropriate.         Speech: He is " communicative. Speech is not slurred.         Behavior: Behavior is not agitated, aggressive, withdrawn or combative.         Cardiac Procedures  Cardiac Procedures  Cardiac Procedures        CATH  5/18/20 90% PROX LAD MID 70%, Om1 80%, INF BRANCH 95% RCA NON OBSTRUCTIVE DISEASE    STRESS    Pharm nuc oct 2021  The EKG does not meet evidence for pharmacologic induced ischemia.  2.  Abnormal nuclear perfusion scan.  Moderate to large inferior and inferior lateral defect which is fixed and consistent with scar in the distribution of the left circumflex coronary artery.  3.  Gated images: Moderate inferior lateral hypokinesis.  The left ventricular ejection fraction is 50%.  Compared to the pharmacologic nuclear stress test from an outside institution dated 7/15/2020: No significant change            CV SURGERY     CABG x 4   5/18 AFTER NONSTEM limA LAD v TO DIAG, v Om1, v Om2  PERIO AFIB         STRESS  Pharm stress testOCT 2021  .  The EKG does not meet evidence for pharmacologic induced ischemia.  2.  Abnormal nuclear perfusion scan.  Moderate to large inferior and inferior lateral defect which is fixed and consistent with scar in the distribution of the left circumflex coronary artery.  3.  Gated images: Moderate inferior lateral hypokinesis.  The left ventricular ejection fraction is 50%.  Compared to the pharmacologic nuclear stress test from an outside institution dated 7/15/2020: No significant change.         ECHO           Echo feb 2023  Left Ventricle: Normal ventricle size. Mild concentric left ventricular hypertrophy. No outflow tract obstruction present. Estimated EF 45-50%. Akinesis of the inferolateral wall. Grade I diastolic dysfunction. Diastolic inflow pattern consistent with impaired relaxation.  •  Right Ventricle: Normal ventricle size. Mildly reduced systolic function.  •  Left Atrium: Normal sized atrium.  •  Right Atrium: Normal sized atrium.  •  Aortic Valve: Valve not well seen but likely  trileaflet.  Sclerotic leaflets. No regurgitation. Mild stenosis. The peak aortic velocity was measured in the apical view. Peak velocity = 2.51 m/s. Mean gradient = 13.00 mmHg. Calculated area by cont eq = 1.56 cm2. Calculated dimensionless index = 0.50.  •  Mitral Valve: Normal leaflet structure. Mild mitral annular calcification. Mild to moderate regurgitation. The jet is eccentric and is posteriorly directed.  •  Tricuspid Valve: Normal structure. Jet is insufficient to calculate pulmonary artery pressure. Trace regurgitation.  •  Pulmonic Valve: Grossly normal structure. Trace regurgitation.  •  Aorta: Aortic root normal. Sinuses of Valsalva normal-sized. Ascending aorta normal-sized.  •  IVC/SVC: Inferior vena cava is a small caliber vessel (<1.7cm). Inferior vena cava collapses >50% during inspiration.  •  Pericardium: Normal structure.  •  No significant change from study of 2021       ELECTROPHYSIOLOGY       paf perio op cabg 5/18    EKG      February 2023 inferolateral T wave inversions, unchanged      July 2022 inferolateral T wave inversions unchanged                                                                  Assessment/Plan:  History of coronary artery bypass graft  2018 after non-STEMI myocardial infarction bypass surgery left internal mammary LAD, vein to diagonal, vein to first marginal, vein to second marginal.  Echocardiogram today inferior lateral akinesis ejection fraction 45 to 50% mild aortic stenosis mean gradient 12    Nonrheumatic aortic valve stenosis  Mild mean gradient 12 EF 45 to 50% wall motion abnormality echo February 2023    CRI (chronic renal insufficiency)  Creatinine 1.3 potassium 4.4    Controlled type 2 diabetes mellitus with complication, without long-term current use of insulin (CMS/[facility])  Follow-up closely with endocrinology improved hemoglobin A1c 8.8 down from 11    Nicotine dependence in remission  Quit 2021    Obesity  Unchanged    Iron deficiency anemia  I do not  have a recent CBC we will follow-up with you for lab work    Hyperlipidemia  LDL cholesterol at goal on rosuvastatin, 54    SOB (shortness of breath)  Multifactorial suspect some underlying COPD from history of smoking obesity hypoventilation probable obstructive sleep apnea cannot exclude some diastolic dysfunction discussed trial of loop diuretic therapy, he declined I asked him to have a BMP with his next lab work to assess if it is markedly elevated suspect will be elevated with his renal insufficiency           History of CABG myocardial infarction moderately depressed ejection fraction ef of 45%-50% mild aortic stenosis stable on echocardiogram February 2023  He follows with endocrinology for diabetes and hypothyroidism  A1c is improved  Cholesterol at goal  His chronic renal sufficiency  His main issue is TURPIN he is beginning nebulizer therapy  He is obese and I suspect has a component of hypoventilation secondary to his anatomy some underlying COPD from history of cigarette smoking  He certainly could have a component of diastolic dysfunction we talked about a trial of loop diuretic but he declined  I would like to have a baseline BNP which would help guide me in this aspect follow-up 6 to 9 months with lab work  Repeat echocardiogram 1 to 2 years              This letter was generated using speech recognition software.  Please excuse any typographical errors.  Fernie Rodriguez MD [facility]   2/21/2023  French Han DO

## 2023-02-21 ENCOUNTER — HOSPITAL ENCOUNTER (OUTPATIENT)
Dept: CARDIOLOGY | Facility: CLINIC | Age: 83
Discharge: HOME | End: 2023-02-21
Payer: MEDICARE

## 2023-02-21 ENCOUNTER — OFFICE VISIT (OUTPATIENT)
Dept: CARDIOLOGY | Facility: CLINIC | Age: 83
End: 2023-02-21
Payer: MEDICARE

## 2023-02-21 VITALS
BODY MASS INDEX: 41.82 KG/M2 | WEIGHT: 236 LBS | HEIGHT: 63 IN | SYSTOLIC BLOOD PRESSURE: 130 MMHG | DIASTOLIC BLOOD PRESSURE: 70 MMHG

## 2023-02-21 VITALS
SYSTOLIC BLOOD PRESSURE: 146 MMHG | WEIGHT: 236 LBS | DIASTOLIC BLOOD PRESSURE: 66 MMHG | RESPIRATION RATE: 18 BRPM | HEART RATE: 67 BPM | BODY MASS INDEX: 41.82 KG/M2 | HEIGHT: 63 IN | OXYGEN SATURATION: 96 %

## 2023-02-21 DIAGNOSIS — E11.8 CONTROLLED TYPE 2 DIABETES MELLITUS WITH COMPLICATION, WITHOUT LONG-TERM CURRENT USE OF INSULIN (CMS/HCC): ICD-10-CM

## 2023-02-21 DIAGNOSIS — N18.9 CHRONIC RENAL IMPAIRMENT, UNSPECIFIED CKD STAGE: ICD-10-CM

## 2023-02-21 DIAGNOSIS — I35.0 NONRHEUMATIC AORTIC VALVE STENOSIS: Primary | ICD-10-CM

## 2023-02-21 DIAGNOSIS — E78.5 HYPERLIPIDEMIA, UNSPECIFIED HYPERLIPIDEMIA TYPE: ICD-10-CM

## 2023-02-21 DIAGNOSIS — Z95.1 HISTORY OF CORONARY ARTERY BYPASS GRAFT: ICD-10-CM

## 2023-02-21 DIAGNOSIS — I48.0 PAF (PAROXYSMAL ATRIAL FIBRILLATION) (CMS/HCC): ICD-10-CM

## 2023-02-21 DIAGNOSIS — C18.7 MALIGNANT NEOPLASM OF SIGMOID COLON (CMS/HCC): ICD-10-CM

## 2023-02-21 PROBLEM — R06.02 SOB (SHORTNESS OF BREATH): Status: ACTIVE | Noted: 2023-02-21

## 2023-02-21 LAB
AORTIC ROOT ANNULUS: 3.4 CM
AORTIC VALVE MEAN VELOCITY: 1.67 M/S
AORTIC VALVE VELOCITY TIME INTEGRAL: 53.7 CM
ASCENDING AORTA: 3.7 CM
AV MEAN GRADIENT: 13 MMHG
AV PEAK GRADIENT: 25 MMHG
AV PEAK VELOCITY-S: 2.51 M/S
AV VALVE AREA INDEX: 0.71
AV VALVE AREA: 1.15 CM2
AV VELOCITY RATIO: 0.5
AVA (VTI): 1.56 CM2
BSA FOR ECHO PROCEDURE: 2.18 M2
DOP CALC LVOT STROKE VOLUME: 83.52 CM3
E WAVE DECELERATION TIME: 401 MS
E/A RATIO: 0.6
E/E' RATIO: 16.2
E/LAT E' RATIO: 7.5
EDV (BP): 73.7 CM3
EF (A4C): 59.8 %
EF A2C: 42.9 %
EJECTION FRACTION: 53.7 %
ESV (BP): 34.1 CM3
FRACTIONAL SHORTENING: 26.74 %
INTERVENTRICULAR SEPTUM: 1.25 CM
LA ESV (BP): 62.3 CM3
LA ESV INDEX (A2C): 23.62 CM3/M2
LA ESV INDEX (BP): 28.58 CM3/M2
LA/AORTA RATIO: 0.85
LAAS-AP2: 19.7 CM2
LAAS-AP4: 25 CM2
LAD 2D: 2.9 CM
LALD A4C: 6.16 CM
LALD A4C: 7.19 CM
LAV-S: 51.5 CM3
LEFT ATRIUM VOLUME INDEX: 30 CM3/M2
LEFT ATRIUM VOLUME: 65.4 CM3
LEFT INTERNAL DIMENSION IN SYSTOLE: 2.52 CM (ref 3.13–4.74)
LEFT VENTRICLE DIASTOLIC VOLUME INDEX: 38.99 CM3/M2
LEFT VENTRICLE DIASTOLIC VOLUME: 85 CM3
LEFT VENTRICLE SYSTOLIC VOLUME INDEX: 15.69 CM3/M2
LEFT VENTRICLE SYSTOLIC VOLUME: 34.2 CM3
LEFT VENTRICULAR INTERNAL DIMENSION IN DIASTOLE: 3.44 CM (ref 5.34–7.42)
LEFT VENTRICULAR POSTERIOR WALL IN END DIASTOLE: 1.14 CM (ref 0.68–1.27)
LV DIASTOLIC VOLUME: 60.3 CM3
LV ESV (APICAL 2 CHAMBER): 34.5 CM3
LVAD-AP2: 22.5 CM2
LVAD-AP4: 27.6 CM2
LVAS-AP2: 15.8 CM2
LVAS-AP4: 15.8 CM2
LVEDVI(A2C): 27.66 CM3/M2
LVEDVI(BP): 33.81 CM3/M2
LVESVI(A2C): 15.83 CM3/M2
LVESVI(BP): 15.64 CM3/M2
LVLD-AP2: 6.99 CM
LVLD-AP4: 7.46 CM
LVLS-AP2: 6.14 CM
LVLS-AP4: 6.17 CM
LVOT 2D: 2 CM
LVOT A: 3.14 CM2
LVOT MG: 3 MMHG
LVOT MV: 0.82 M/S
LVOT PEAK VELOCITY: 1.17 M/S
LVOT PG: 5 MMHG
LVOT STROKE VOLUME INDEX: 38.31 ML/M2
LVOT VTI: 26.6 CM
MLH CV ECHO AVA INDEX VELOCITY RATIO: 0.5
MV E'TISSUE VEL-LAT: 0.13 M/S
MV E'TISSUE VEL-MED: 0.06 M/S
MV PEAK A VEL: 1.45 M/S
MV PEAK E VEL: 0.94 M/S
POSTERIOR WALL: 1.14 CM
PULMONARY REGURGITATION LATE DIASTOLIC VELOCITY: 0.9 M/S
PV PEAK GRADIENT: 5 MMHG
PV PV: 1.17 M/S
RVOT VMAX: 0.78 M/S
RVOT VTI: 14.8 CM
SEPTAL TISSUE DOPPLER FREE WALL LATE DIA VELOCITY (APICAL 4 CHAMBER VIEW): 0.9 M/S
Z-SCORE OF LEFT VENTRICULAR DIMENSION IN END DIASTOLE: -6.04
Z-SCORE OF LEFT VENTRICULAR DIMENSION IN END SYSTOLE: -3.35
Z-SCORE OF LEFT VENTRICULAR POSTERIOR WALL IN END DIASTOLE: 1.06

## 2023-02-21 PROCEDURE — 93306 TTE W/DOPPLER COMPLETE: CPT | Performed by: INTERNAL MEDICINE

## 2023-02-21 PROCEDURE — 93000 ELECTROCARDIOGRAM COMPLETE: CPT | Performed by: INTERNAL MEDICINE

## 2023-02-21 PROCEDURE — 99214 OFFICE O/P EST MOD 30 MIN: CPT | Performed by: INTERNAL MEDICINE

## 2023-02-21 NOTE — ASSESSMENT & PLAN NOTE
Multifactorial suspect some underlying COPD from history of smoking obesity hypoventilation probable obstructive sleep apnea cannot exclude some diastolic dysfunction discussed trial of loop diuretic therapy, he declined I asked him to have a BMP with his next lab work to assess if it is markedly elevated suspect will be elevated with his renal insufficiency

## 2023-02-21 NOTE — ASSESSMENT & PLAN NOTE
2018 after non-STEMI myocardial infarction bypass surgery left internal mammary LAD, vein to diagonal, vein to first marginal, vein to second marginal.  Echocardiogram today inferior lateral akinesis ejection fraction 45 to 50% mild aortic stenosis mean gradient 12

## 2023-05-10 ENCOUNTER — APPOINTMENT (RX ONLY)
Dept: URBAN - METROPOLITAN AREA CLINIC 374 | Facility: CLINIC | Age: 83
Setting detail: DERMATOLOGY
End: 2023-05-10

## 2023-05-10 DIAGNOSIS — L57.8 OTHER SKIN CHANGES DUE TO CHRONIC EXPOSURE TO NONIONIZING RADIATION: ICD-10-CM

## 2023-05-10 DIAGNOSIS — L82.1 OTHER SEBORRHEIC KERATOSIS: ICD-10-CM

## 2023-05-10 DIAGNOSIS — L81.4 OTHER MELANIN HYPERPIGMENTATION: ICD-10-CM

## 2023-05-10 DIAGNOSIS — L21.8 OTHER SEBORRHEIC DERMATITIS: ICD-10-CM | Status: RESOLVED

## 2023-05-10 DIAGNOSIS — L57.0 ACTINIC KERATOSIS: ICD-10-CM

## 2023-05-10 PROCEDURE — ? COUNSELING

## 2023-05-10 PROCEDURE — 17000 DESTRUCT PREMALG LESION: CPT

## 2023-05-10 PROCEDURE — 99213 OFFICE O/P EST LOW 20 MIN: CPT | Mod: 25

## 2023-05-10 PROCEDURE — 17003 DESTRUCT PREMALG LES 2-14: CPT

## 2023-05-10 PROCEDURE — ? PRESCRIPTION MEDICATION MANAGEMENT

## 2023-05-10 PROCEDURE — ? PREMALIGNANT DESTRUCTION

## 2023-05-10 PROCEDURE — ? FULL BODY SKIN EXAM

## 2023-05-10 ASSESSMENT — LOCATION SIMPLE DESCRIPTION DERM
LOCATION SIMPLE: RIGHT CHEEK
LOCATION SIMPLE: RIGHT TEMPLE
LOCATION SIMPLE: RIGHT FOREARM
LOCATION SIMPLE: LEFT FOREARM
LOCATION SIMPLE: LEFT AXILLARY VAULT
LOCATION SIMPLE: RIGHT AXILLARY VAULT
LOCATION SIMPLE: RIGHT FOREHEAD
LOCATION SIMPLE: LEFT UPPER BACK
LOCATION SIMPLE: CHEST

## 2023-05-10 ASSESSMENT — LOCATION DETAILED DESCRIPTION DERM
LOCATION DETAILED: RIGHT INFERIOR FOREHEAD
LOCATION DETAILED: LEFT MEDIAL UPPER BACK
LOCATION DETAILED: RIGHT FOREHEAD
LOCATION DETAILED: RIGHT CENTRAL TEMPLE
LOCATION DETAILED: STERNUM
LOCATION DETAILED: LEFT AXILLARY VAULT
LOCATION DETAILED: RIGHT AXILLARY VAULT
LOCATION DETAILED: LEFT MEDIAL INFERIOR CHEST
LOCATION DETAILED: RIGHT CENTRAL MALAR CHEEK
LOCATION DETAILED: RIGHT DISTAL DORSAL FOREARM
LOCATION DETAILED: RIGHT PROXIMAL DORSAL FOREARM
LOCATION DETAILED: LEFT DISTAL DORSAL FOREARM
LOCATION DETAILED: STERNAL NOTCH
LOCATION DETAILED: LEFT PROXIMAL DORSAL FOREARM

## 2023-05-10 ASSESSMENT — LOCATION ZONE DERM
LOCATION ZONE: ARM
LOCATION ZONE: FACE
LOCATION ZONE: AXILLAE
LOCATION ZONE: TRUNK

## 2023-05-10 ASSESSMENT — SEVERITY ASSESSMENT: HOW SEVERE IS THIS PATIENT'S CONDITION?: CLEAR

## 2023-05-10 NOTE — PROCEDURE: PREMALIGNANT DESTRUCTION
Detail Level: Detailed
Post-Care Instructions: I reviewed with the patient in detail post-care instructions. Patient is to wear sunprotection, and avoid picking at any of the treated lesions. Pt may apply Vaseline to crusted or scabbing areas.
Method: electrodesiccation
Render Note In Bullet Format When Appropriate: No
Consent: The patient's consent was obtained including but not limited to risks of crusting, scabbing, blistering, scarring, darker or lighter pigmentary change, recurrence, incomplete removal and infection.
Anesthesia Volume In Cc: 0

## 2023-05-10 NOTE — PROCEDURE: PRESCRIPTION MEDICATION MANAGEMENT
Render In Strict Bullet Format?: No
Modify Regimen: Clotrimazole 1% Betamethasone 0.05% cream- apply a thin layer QDAY to AA of body PRN flare
Detail Level: Zone

## 2023-07-25 ENCOUNTER — TELEPHONE (OUTPATIENT)
Dept: SCHEDULING | Facility: CLINIC | Age: 83
End: 2023-07-25
Payer: MEDICARE

## 2023-09-20 ENCOUNTER — APPOINTMENT (RX ONLY)
Dept: URBAN - METROPOLITAN AREA CLINIC 374 | Facility: CLINIC | Age: 83
Setting detail: DERMATOLOGY
End: 2023-09-20

## 2023-09-20 DIAGNOSIS — L82.0 INFLAMED SEBORRHEIC KERATOSIS: ICD-10-CM

## 2023-09-20 PROBLEM — D48.5 NEOPLASM OF UNCERTAIN BEHAVIOR OF SKIN: Status: ACTIVE | Noted: 2023-09-20

## 2023-09-20 PROCEDURE — ? PHOTO-DOCUMENTATION

## 2023-09-20 PROCEDURE — ? SHAVE REMOVAL

## 2023-09-20 PROCEDURE — 11302 SHAVE SKIN LESION 1.1-2.0 CM: CPT

## 2023-09-20 ASSESSMENT — LOCATION SIMPLE DESCRIPTION DERM: LOCATION SIMPLE: LEFT POSTERIOR THIGH

## 2023-09-20 ASSESSMENT — LOCATION ZONE DERM: LOCATION ZONE: LEG

## 2023-09-20 ASSESSMENT — LOCATION DETAILED DESCRIPTION DERM: LOCATION DETAILED: LEFT DISTAL POSTERIOR THIGH

## 2023-09-20 NOTE — PROCEDURE: SHAVE REMOVAL
Medical Necessity Information: It is in your best interest to select a reason for this procedure from the list below. All of these items fulfill various CMS LCD requirements except the new and changing color options.
Medical Necessity Clause: This procedure was medically necessary because the lesion that was treated was:
Lab: 6
Lab Facility: 0
Detail Level: Detailed
Was A Bandage Applied: Yes
Size Of Lesion In Cm (Required): 1.2
Depth Of Shave: dermis
Biopsy Method: Dermablade
Anesthesia Type: 1% lidocaine with epinephrine
Hemostasis: Aluminum Chloride and Electrocautery
Wound Care: Petrolatum
Render Path Notes In Note?: No
Consent was obtained from the patient. The risks and benefits to therapy were discussed in detail. Specifically, the risks of infection, scarring, bleeding, prolonged wound healing, incomplete removal, allergy to anesthesia, nerve injury and recurrence were addressed. Prior to the procedure, the treatment site was clearly identified and confirmed by the patient. All components of Universal Protocol/PAUSE Rule completed.
Post-Care Instructions: I reviewed with the patient in detail post-care instructions. Patient is to keep the biopsy site dry overnight, and then apply bacitracin twice daily until healed. Patient may apply hydrogen peroxide soaks to remove any crusting.
Notification Instructions: Patient will be notified of pathology results. However, patient instructed to call the office if not contacted within 2 weeks.
Billing Type: Third-Party Bill

## 2023-10-05 RX ORDER — METOPROLOL SUCCINATE 25 MG/1
25 TABLET, EXTENDED RELEASE ORAL DAILY
Qty: 90 TABLET | Refills: 3 | Status: SHIPPED | OUTPATIENT
Start: 2023-10-05 | End: 2023-11-09 | Stop reason: SDUPTHER

## 2023-10-05 RX ORDER — ROSUVASTATIN CALCIUM 40 MG/1
40 TABLET, COATED ORAL DAILY
Qty: 90 TABLET | Refills: 3 | Status: SHIPPED | OUTPATIENT
Start: 2023-10-05 | End: 2023-10-26 | Stop reason: SDUPTHER

## 2023-10-05 NOTE — TELEPHONE ENCOUNTER
"Medicine Refill Request    Pt's daughter Gina called requests a refill of     metoprolol succinate XL (TOPROL-XL) 25 mg 24 hr tablet 90# with 3 refills       rosuvastatin (CRESTOR) 40 mg tablet 90# with 3 refills         Last Office Visit: Visit date not found   Last Consult Visit: Visit date not found  Last Telemedicine Visit: Visit date not found    Next Appointment: Visit date not found      Current Outpatient Medications:     acetaminophen (TYLENOL) 325 mg tablet, 650 mg every 4 (four) hours as needed.  , Disp: , Rfl:     albuterol HFA (VENTOLIN HFA) 90 mcg/actuation inhaler, inhale 2 puffs by mouth every 6 hours, Disp: , Rfl:     aspirin 81 mg enteric coated tablet, Take 81 mg by mouth daily., Disp: , Rfl:     BD ULTRA-FINE MINI PEN NEEDLE 31 gauge x 3/16\" needle, use 4 daily, Disp: , Rfl:     docusate sodium (COLACE) 100 mg capsule, Take 100 mg by mouth once., Disp: , Rfl:     hydrocortisone 2.5 % cream, as needed.  , Disp: , Rfl:     insulin aspart U-100 (NovoLOG) 100 unit/mL injection, Inject under the skin 3 (three) times a day before meals. Pt takes 16 units TID, and an additional 400units as directed, Disp: , Rfl:     JARDIANCE 25 mg tablet, Take 25 mg by mouth once daily., Disp: , Rfl:     levothyroxine (SYNTHROID) 200 mcg tablet, Take 200 mcg by mouth See admin instr., Disp: , Rfl:     levothyroxine sodium (TIROSINT) 50 mcg capsule, Take 50 mcg by mouth See admin instr. Along with 200mcg  , Disp: , Rfl:     metoprolol succinate XL (TOPROL-XL) 25 mg 24 hr tablet, Take 1 tablet (25 mg total) by mouth daily., Disp: 90 tablet, Rfl: 3    rosuvastatin (CRESTOR) 40 mg tablet, Take 1 tablet (40 mg total) by mouth daily., Disp: 90 tablet, Rfl: 3    tamsulosin (FLOMAX) 0.4 mg capsule, Take 0.4 mg by mouth once daily., Disp: , Rfl:     TRESIBA FLEXTOUCH U-200 200 unit/mL (3 mL) pen, , Disp: , Rfl:     triamcinolone (KENALOG) 0.1 % cream, APPLY two times a day as needed FOR ANKLE PRURITIS, Disp: , " Rfl:       BP Readings from Last 3 Encounters:   02/21/23 130/70   02/21/23 (!) 146/66   07/14/22 (!) 142/86       Recent Lab results:  Lab Results   Component Value Date    CHOL 152 05/19/2018   ,   Lab Results   Component Value Date    HDL 29 (L) 05/19/2018   ,   Lab Results   Component Value Date    LDLCALC 103 (H) 05/19/2018   ,   Lab Results   Component Value Date    TRIG 101 05/19/2018        Lab Results   Component Value Date    GLUCOSE 94 05/26/2018   ,   Lab Results   Component Value Date    HGBA1C 10.5 (H) 05/19/2018         Lab Results   Component Value Date    CREATININE 1.3 05/26/2018       Lab Results   Component Value Date    TSH 15.64 (H) 09/13/2019           Lab Results   Component Value Date    HGBA1C 10.5 (H) 05/19/2018

## 2023-10-26 RX ORDER — ROSUVASTATIN CALCIUM 40 MG/1
40 TABLET, COATED ORAL DAILY
Qty: 90 TABLET | Refills: 3 | Status: SHIPPED | OUTPATIENT
Start: 2023-10-26 | End: 2023-11-09 | Stop reason: SDUPTHER

## 2023-10-26 NOTE — TELEPHONE ENCOUNTER
"Medicine Refill Request [facility]    Name of Patient: Maximus Daniel    Caller's name/Relationship: Gina    Callback number: [phone]    Medication Name, Dosage, Supply:  rosuvastatin (CRESTOR) 40 mg tablet    Quantity left: about a week supply    Pharmacy: [id number]    Medication sent on 10/5, Pt's daughter is stating [facility] did not receive new script and is requesting it be resent.    Last Office Visit: 2/21/23  Last Consult Visit: Visit date not found  Last Telemedicine Visit: Visit date not found    Next Appointment: Visit date not found      Current Outpatient Medications:     metoprolol succinate XL (TOPROL-XL) 25 mg 24 hr tablet, Take 1 tablet (25 mg total) by mouth daily., Disp: 90 tablet, Rfl: 3    rosuvastatin (CRESTOR) 40 mg tablet, Take 1 tablet (40 mg total) by mouth daily., Disp: 90 tablet, Rfl: 3    acetaminophen (TYLENOL) 325 mg tablet, 650 mg every 4 (four) hours as needed.  , Disp: , Rfl:     albuterol HFA (VENTOLIN HFA) 90 mcg/actuation inhaler, inhale 2 puffs by mouth every 6 hours, Disp: , Rfl:     aspirin 81 mg enteric coated tablet, Take 81 mg by mouth daily., Disp: , Rfl:     BD ULTRA-FINE MINI PEN NEEDLE 31 gauge x 3/16\" needle, use 4 daily, Disp: , Rfl:     docusate sodium (COLACE) 100 mg capsule, Take 100 mg by mouth once., Disp: , Rfl:     hydrocortisone 2.5 % cream, as needed.  , Disp: , Rfl:     insulin aspart U-100 (NovoLOG) 100 unit/mL injection, Inject under the skin 3 (three) times a day before meals. Pt takes 16 units TID, and an additional 400units as directed, Disp: , Rfl:     JARDIANCE 25 mg tablet, Take 25 mg by mouth once daily., Disp: , Rfl:     levothyroxine (SYNTHROID) 200 mcg tablet, Take 200 mcg by mouth See admin instr., Disp: , Rfl:     levothyroxine sodium (TIROSINT) 50 mcg capsule, Take 50 mcg by mouth See admin instr. Along with 200mcg  , Disp: , Rfl:     tamsulosin (FLOMAX) 0.4 mg capsule, Take 0.4 mg by mouth once daily., Disp: , Rfl:     TRESIBA " FLEXTOUCH U-200 200 unit/mL (3 mL) pen, , Disp: , Rfl:     triamcinolone (KENALOG) 0.1 % cream, APPLY two times a day as needed FOR ANKLE PRURITIS, Disp: , Rfl:       BP Readings from Last 3 Encounters:   02/21/23 130/70   02/21/23 (!) 146/66   07/14/22 (!) 142/86       Recent Lab results:  Lab Results   Component Value Date    CHOL 152 05/19/2018   ,   Lab Results   Component Value Date    HDL 29 (L) 05/19/2018   ,   Lab Results   Component Value Date    LDLCALC 103 (H) 05/19/2018   ,   Lab Results   Component Value Date    TRIG 101 05/19/2018        Lab Results   Component Value Date    GLUCOSE 94 05/26/2018   ,   Lab Results   Component Value Date    HGBA1C 10.5 (H) 05/19/2018         Lab Results   Component Value Date    CREATININE 1.3 05/26/2018       Lab Results   Component Value Date    TSH 15.64 (H) 09/13/2019

## 2023-11-03 NOTE — ASSESSMENT & PLAN NOTE
May 2018 nonstemi  Followed by bypass surgery  Left internal mammary LAD, vein diagonal, vein OM1, vein OM 2  Echocardiogram July 2018 ejection fraction 50% mild aortic stenosis with mean gradient of 10     CARDIOLOGY NP PROGRESS NOTE    Subjective:   Remainder ROS otherwise negative.    Overnight Events:     TELEMETRY:    EKG:      VITAL SIGNS:  T(C): 36.4 (11-03-23 @ 08:35), Max: 36.6 (11-02-23 @ 20:52)  HR: 55 (11-03-23 @ 08:35) (55 - 95)  BP: 109/67 (11-03-23 @ 08:35) (102/70 - 121/66)  RR: 17 (11-03-23 @ 08:35) (17 - 17)  SpO2: 97% (11-03-23 @ 08:35) (97% - 97%)  Wt(kg): --    I&O's Summary    02 Nov 2023 07:01  -  03 Nov 2023 07:00  --------------------------------------------------------  IN: 480 mL / OUT: 1300 mL / NET: -820 mL    03 Nov 2023 07:01  -  03 Nov 2023 12:43  --------------------------------------------------------  IN: 560 mL / OUT: 1000 mL / NET: -440 mL          PHYSICAL EXAM:    General: A/ox 3, No acute Distress  Neck: Supple, NO JVD  Cardiac: S1 S2, No M/R/G  Pulmonary: CTAB, Breathing unlabored, No Rhonchi/Rales/Wheezing  Abdomen: Soft, Non -tender, +BS x 4 quads  Extremities: No Rashes, No edema  Neuro: A/o x 3, No focal deficits          LABS:                          10.8   3.17  )-----------( 223      ( 03 Nov 2023 05:30 )             33.8                              11-03    136  |  102  |  12  ----------------------------<  93  4.4   |  24  |  0.85    Ca    9.4      03 Nov 2023 05:30  Mg     1.4     11-03                              PT/INR - ( 03 Nov 2023 05:30 )   PT: 12.4 sec;   INR: 1.09          PTT - ( 03 Nov 2023 05:30 )  PTT:36.5 sec  CAPILLARY BLOOD GLUCOSE        CARDIAC MARKERS ( 02 Nov 2023 08:43 )  x     / x     / 53 U/L / x     / x              Allergies:  No Known Allergies    MEDICATIONS  (STANDING):  clotrimazole 1% Cream 1 Application(s) Topical two times a day  dapagliflozin 10 milliGRAM(s) Oral every 24 hours  DAPTOmycin IVPB      DAPTOmycin IVPB 500 milliGRAM(s) IV Intermittent every 24 hours  magnesium oxide 400 milliGRAM(s) Oral three times a day with meals  trimethoprim  160 mG/sulfamethoxazole 800 mG 1 Tablet(s) Oral daily  valsartan 40 milliGRAM(s) Oral daily    MEDICATIONS  (PRN):        DIAGNOSTIC TESTS:        CARDIOLOGY NP PROGRESS NOTE    Subjective: Pt seen and examined at bedside. Reports feeling well. Denies chest pain, sob, lightheadedness, dizziness, palpitations, fever, chills.  Remainder ROS otherwise negative.    Overnight Events: none    TELEMETRY: off tele      VITAL SIGNS:  T(C): 36.4 (11-03-23 @ 08:35), Max: 36.6 (11-02-23 @ 20:52)  HR: 55 (11-03-23 @ 08:35) (55 - 95)  BP: 109/67 (11-03-23 @ 08:35) (102/70 - 121/66)  RR: 17 (11-03-23 @ 08:35) (17 - 17)  SpO2: 97% (11-03-23 @ 08:35) (97% - 97%)  Wt(kg): --    I&O's Summary    02 Nov 2023 07:01  -  03 Nov 2023 07:00  --------------------------------------------------------  IN: 480 mL / OUT: 1300 mL / NET: -820 mL    03 Nov 2023 07:01  -  03 Nov 2023 12:43  --------------------------------------------------------  IN: 560 mL / OUT: 1000 mL / NET: -440 mL          PHYSICAL EXAM:    Refused exam          LABS:                          10.8   3.17  )-----------( 223      ( 03 Nov 2023 05:30 )             33.8                              11-03    136  |  102  |  12  ----------------------------<  93  4.4   |  24  |  0.85    Ca    9.4      03 Nov 2023 05:30  Mg     1.4     11-03      PT/INR - ( 03 Nov 2023 05:30 )   PT: 12.4 sec;   INR: 1.09          PTT - ( 03 Nov 2023 05:30 )  PTT:36.5 sec  CAPILLARY BLOOD GLUCOSE        CARDIAC MARKERS ( 02 Nov 2023 08:43 )  x     / x     / 53 U/L / x     / x              Allergies:  No Known Allergies    MEDICATIONS  (STANDING):  clotrimazole 1% Cream 1 Application(s) Topical two times a day  dapagliflozin 10 milliGRAM(s) Oral every 24 hours  DAPTOmycin IVPB      DAPTOmycin IVPB 500 milliGRAM(s) IV Intermittent every 24 hours  magnesium oxide 400 milliGRAM(s) Oral three times a day with meals  trimethoprim  160 mG/sulfamethoxazole 800 mG 1 Tablet(s) Oral daily  valsartan 40 milliGRAM(s) Oral daily    MEDICATIONS  (PRN):        DIAGNOSTIC TESTS:        Banner Transposition Flap Text: The defect edges were debeveled with a #15 scalpel blade.  Given the location of the defect and the proximity to free margins a Banner transposition flap was deemed most appropriate.  Using a sterile surgical marker, an appropriate flap drawn around the defect. The area thus outlined was incised deep to adipose tissue with a #15 scalpel blade.  The skin margins were undermined to an appropriate distance in all directions utilizing iris scissors.

## 2023-11-06 NOTE — PROGRESS NOTES
Cardiology Note    French Garcia DO          Maximus Daniel is a 83 y.o. male 1940 ;    He returns for follow-up and review his lab work he is here today with his son  He underwent coronary bypass surgery in 2018 after non-STEMI myocardial infarction  Left internal mammary LAD, vein to diagonal, vein to first marginal, vein to second marginal  L  Echocardiogram today November 2023 showed inferior wall akinetic mild aortic stenosis mean gradient 12 mmHg no significant change from 2021  He has chronic renal sufficiency  Diabetes follows with endocrinology  Quit cigarette smoking 2021 he is overweight iron deficiency anemia hyperlipidemia COPD O2 sat on room air 89% he uses oxygen at night  Colon cancer resected 2018  He complains of a cough when he lies down BNP in the past has been normal at 30  He seen by his family physician, home care every month and they do frequent lab work          Lab work July and August 2023  BNP 30  CBC normal  Glucose 200 hemoglobin A1c 7.6  Creatinine 1.44 potassium 3.7 previous creatinine 1.3  Cholesterol 115 triglycerides 228 HDL 32 LDL 47    Lab work from October  Creatinine 1.29 potassium 4.1  Hemoglobin A1c 7.2  TSH 0.4                                           Patient Active Problem List    Diagnosis Date Noted    SOB (shortness of breath) 02/21/2023    Obesity 11/01/2021    CRI (chronic renal insufficiency) 02/03/2020    Anemia 02/03/2020    Nicotine dependence in remission 02/03/2020    Malignant neoplasm of sigmoid colon (CMS/[facility]) 11/06/2018    Iron deficiency anemia 08/02/2018    PAF (paroxysmal atrial fibrillation) (CMS/[facility]) 08/02/2018    History of coronary artery bypass graft 06/11/2018    Nonrheumatic aortic valve stenosis 06/11/2018    Acute ST elevation myocardial infarction (STEMI) (CMS/[facility]) 05/19/2018    Controlled type 2 diabetes mellitus with complication, without long-term current use of insulin (CMS/[facility]) 05/18/2018    Incarcerated  "hernia 05/18/2018    Hypothyroidism 05/18/2018    Hyperlipidemia 05/18/2018    Nephrolithiasis 05/18/2018       Allergy  Patient has no known allergies.    MED LIST     Current Outpatient Medications   Medication Sig Dispense Refill    acetaminophen (TYLENOL) 325 mg tablet 650 mg every 4 (four) hours as needed.        albuterol HFA (VENTOLIN HFA) 90 mcg/actuation inhaler inhale 2 puffs by mouth every 6 hours      aspirin 81 mg enteric coated tablet Take 81 mg by mouth daily.      BD ULTRA-FINE MINI PEN NEEDLE 31 gauge x 3/16\" needle use 4 daily      docusate sodium (COLACE) 100 mg capsule Take 100 mg by mouth once.      hydrocortisone 2.5 % cream as needed.        insulin aspart U-100 (NovoLOG) 100 unit/mL injection Inject under the skin 3 (three) times a day before meals. Pt takes 16 units TID, and an additional 400units as directed      JARDIANCE 25 mg tablet Take 25 mg by mouth once daily.      levothyroxine (SYNTHROID) 200 mcg tablet Take 200 mcg by mouth See admin instr.      metoprolol succinate XL (TOPROL-XL) 25 mg 24 hr tablet Take 1 tablet (25 mg total) by mouth daily. 90 tablet 3    rosuvastatin (CRESTOR) 40 mg tablet Take 1 tablet (40 mg total) by mouth daily. 90 tablet 3    tamsulosin (FLOMAX) 0.4 mg capsule Take 0.4 mg by mouth once daily.      TRESIBA FLEXTOUCH U-200 200 unit/mL (3 mL) pen       triamcinolone (KENALOG) 0.1 % cream APPLY two times a day as needed FOR ANKLE PRURITIS      cyanocobalamin (VITAMIN B-12) 1,000 mcg/mL injection INJECT 1ML INTRAMUSCULARLY ONCE EVERY 4-6 WEEKS BY A PRACTIONER      levothyroxine sodium (TIROSINT) 50 mcg capsule Take 50 mcg by mouth See admin instr. Along with 200mcg         No current facility-administered medications for this visit.        ROS    Labs dec 2019  Hemoglobin A1c 8.8  Cholesterol LDL 68  Creatinine 1.34 potassium 5 hemoglobin 17  Thyroid studies normal                                                    Objective   Vitals:    " "11/09/23 1418 11/09/23 1506 11/09/23 1525   BP:  120/70 120/70   BP Location:  Left upper arm    Patient Position:  Sitting    Pulse:  76 70   SpO2:  (!) 89% (!) 89%   Weight:  102 kg (224 lb)    Height: 1.6 m (5' 3\") 1.6 m (5' 3\")      Physical Exam  Constitutional:       General: He is not in acute distress.     Appearance: He is well-developed. He is obese. He is not ill-appearing, toxic-appearing or diaphoretic.      Interventions: He is not intubated.     Comments: Overweight   HENT:      Head: Normocephalic. Not microcephalic. No raccoon eyes, abrasion or contusion.      Nose: Nose normal.   Eyes:      General: No scleral icterus.        Left eye: No discharge.      Conjunctiva/sclera:      Right eye: Right conjunctiva is not injected.      Left eye: Left conjunctiva is not injected. No exudate or hemorrhage.     Pupils: Pupils are equal.   Neck:      Vascular: Normal carotid pulses. No carotid bruit or hepatojugular reflux.   Cardiovascular:      Rate and Rhythm: Normal rate and regular rhythm.      Chest Wall: PMI is not displaced.      Pulses: Normal pulses and intact distal pulses.      Heart sounds: Normal heart sounds. No murmur heard.     No friction rub. No gallop.      Comments: 1/6 systolic murmur  Pulmonary:      Effort: Pulmonary effort is normal. No tachypnea, bradypnea or respiratory distress. He is not intubated.      Breath sounds: Normal breath sounds. No stridor. No wheezing or rales.   Chest:      Chest wall: No tenderness.   Abdominal:      General: Bowel sounds are normal. There is no distension.      Palpations: Abdomen is soft.      Tenderness: There is no abdominal tenderness.   Musculoskeletal:         General: No deformity. Normal range of motion.      Cervical back: Normal range of motion and neck supple.      Comments: Central obesity kyphosis   Skin:     Coloration: Skin is not pale.      Findings: No abrasion, bruising, ecchymosis, erythema or rash.   Neurological:      Mental " Status: He is alert and oriented to person, place, and time.   Psychiatric:         Mood and Affect: Mood is not anxious or depressed. Affect is not labile, blunt, angry or inappropriate.         Speech: He is communicative. Speech is not slurred.         Behavior: Behavior is not agitated, aggressive, withdrawn or combative.         Cardiac Procedures  Cardiac Procedures  Cardiac Procedures        CATH  5/18/20 90% PROX LAD MID 70%, Om1 80%, INF BRANCH 95% RCA NON OBSTRUCTIVE DISEASE    STRESS    Pharm nuc oct 2021  The EKG does not meet evidence for pharmacologic induced ischemia.  2.  Abnormal nuclear perfusion scan.  Moderate to large inferior and inferior lateral defect which is fixed and consistent with scar in the distribution of the left circumflex coronary artery.  3.  Gated images: Moderate inferior lateral hypokinesis.  The left ventricular ejection fraction is 50%.  Compared to the pharmacologic nuclear stress test from an outside institution dated 7/15/2020: No significant change            CV SURGERY     CABG x 4   5/18 AFTER NONSTEM limA LAD v TO DIAG, v Om1, v Om2  PERIO AFIB         STRESS  Pharm stress testOCT 2021  .  The EKG does not meet evidence for pharmacologic induced ischemia.  2.  Abnormal nuclear perfusion scan.  Moderate to large inferior and inferior lateral defect which is fixed and consistent with scar in the distribution of the left circumflex coronary artery.  3.  Gated images: Moderate inferior lateral hypokinesis.  The left ventricular ejection fraction is 50%.  Compared to the pharmacologic nuclear stress test from an outside institution dated 7/15/2020: No significant change.         ECHO           Ech  Echo nov 2023  Left Ventricle: Normal ventricle size. Mild concentric left ventricular hypertrophy. No outflow tract obstruction present. Estimated EF 45-50%. Inferior lateral akinesis Grade I diastolic dysfunction.    Right Ventricle: Normal ventricle size. Mildly reduced  systolic function.    Left Atrium: Normal sized atrium.    Right Atrium: Normal sized atrium.    Aortic Valve: Tricuspid valve.  Calcification present with reduced excursion. Trace regurgitation. Mild stenosis. The peak aortic velocity was measured in the apical view. Peak velocity = 2.52 m/s. Mean gradient = 13.00 mmHg. Calculated area by cont eq = 1.39 cm2. Calculated dimensionless index = 0.44.    Mitral Valve: Normal leaflet structure. Mitral annular calcification. Mild regurgitation.    Tricuspid Valve: Normal structure. Jet is insufficient to calculate pulmonary artery pressure. Trace regurgitation.    Pulmonic Valve: Normal structure. Trace regurgitation.    Aorta: Aortic root normal. Sinuses of Valsalva normal-sized. Ascending aorta normal-sized.    IVC/SVC: Inferior vena cava is a small caliber vessel (<1.7cm). Inferior vena cava demonstrates normal respiratory collapse.    Pericardium: Normal structure.    No significant change from study of 2021                   ELECTROPHYSIOLOGY       paf perio op cabg 5/18    EKG          Nonspecific inferolateral T wave inversions stable from 2023 July 2022 inferolateral T wave inversions unchanged                                                                  Assessment/Plan:  History of coronary artery bypass graft  2018 after myocardial infarction bypass x4  LIMA to LAD, vein to diagonal vein to marginal 1 vein to marginal 2  Mild aortic stenosis  Echocardiogram today November 2023 stable mild aortic stenosis mean gradient 12 inferolateral akinesis  Ejection fraction 45 to 50%  BNP NORMAL at 30  Blood sugar shortness of breath believes related to underlying COPD uses oxygen at night    Controlled type 2 diabetes mellitus with complication, without long-term current use of insulin (CMS/[facility])  On insulin and SGLT2 inhibitor follows with endocrinology hemoglobin A1c 7.2    Hypothyroidism  On replacement dose being  adjusted    Obesity  Central obesity kyphosis I believe this contributes to her shortness of breath ventilatory restriction    CRI (chronic renal insufficiency)  Creatinine 1.29 potassium 4.1, stable    Nicotine dependence in remission  Quit 2021 he was a heavy cigarette smoker    Hyperlipidemia  On rosuvastatin LDL cholesterol 47           History of CABG myocardial infarction mild to moderate depressed ejection fraction ef of 45%-50% mild aortic stenosis stable on echocardiogram November 2023 2023  He follows with endocrinology for diabetes and hypothyroidism  A1c is improved  Cholesterol at goal  His chronic renal sufficiency    He is obese and I suspect has a component of hypoventilation and restriction  Due to his body habitus  He uses oxygen at night   secondary to his anatomy some underlying COPD from long history of cigarette smoking  BNP in the past has been 30 I do not think there is a component of significant heart failure  I made no changes cardiac medications  Repeat echocardiogram for his mild aortic stenosis 1 to 2 years                This letter was generated using speech recognition software.  Please excuse any typographical errors.  Fernie Rodriguez MD [facility]   11/9/2023  Cc French Garcia,

## 2023-11-09 ENCOUNTER — OFFICE VISIT (OUTPATIENT)
Dept: CARDIOLOGY | Facility: CLINIC | Age: 83
End: 2023-11-09
Payer: MEDICARE

## 2023-11-09 ENCOUNTER — HOSPITAL ENCOUNTER (OUTPATIENT)
Dept: CARDIOLOGY | Facility: CLINIC | Age: 83
Discharge: HOME | End: 2023-11-09
Attending: INTERNAL MEDICINE
Payer: MEDICARE

## 2023-11-09 VITALS
HEIGHT: 63 IN | BODY MASS INDEX: 39.69 KG/M2 | DIASTOLIC BLOOD PRESSURE: 65 MMHG | SYSTOLIC BLOOD PRESSURE: 112 MMHG | WEIGHT: 224 LBS

## 2023-11-09 VITALS
DIASTOLIC BLOOD PRESSURE: 70 MMHG | HEIGHT: 63 IN | BODY MASS INDEX: 39.69 KG/M2 | OXYGEN SATURATION: 89 % | WEIGHT: 224 LBS | SYSTOLIC BLOOD PRESSURE: 120 MMHG | HEART RATE: 70 BPM

## 2023-11-09 DIAGNOSIS — F17.211 CIGARETTE NICOTINE DEPENDENCE IN REMISSION: ICD-10-CM

## 2023-11-09 DIAGNOSIS — I35.0 NONRHEUMATIC AORTIC VALVE STENOSIS: Primary | ICD-10-CM

## 2023-11-09 DIAGNOSIS — E78.5 HYPERLIPIDEMIA, UNSPECIFIED HYPERLIPIDEMIA TYPE: ICD-10-CM

## 2023-11-09 DIAGNOSIS — C18.7 MALIGNANT NEOPLASM OF SIGMOID COLON (CMS/HCC): ICD-10-CM

## 2023-11-09 DIAGNOSIS — N18.9 CHRONIC RENAL IMPAIRMENT, UNSPECIFIED CKD STAGE: ICD-10-CM

## 2023-11-09 DIAGNOSIS — E11.8 CONTROLLED TYPE 2 DIABETES MELLITUS WITH COMPLICATION, WITHOUT LONG-TERM CURRENT USE OF INSULIN (CMS/HCC): ICD-10-CM

## 2023-11-09 DIAGNOSIS — Z95.1 HISTORY OF CORONARY ARTERY BYPASS GRAFT: ICD-10-CM

## 2023-11-09 DIAGNOSIS — E03.9 HYPOTHYROIDISM, UNSPECIFIED TYPE: ICD-10-CM

## 2023-11-09 DIAGNOSIS — I35.0 NONRHEUMATIC AORTIC VALVE STENOSIS: ICD-10-CM

## 2023-11-09 LAB
AORTIC ROOT ANNULUS: 3.4 CM
AORTIC VALVE MEAN VELOCITY: 1.68 M/S
AORTIC VALVE VELOCITY TIME INTEGRAL: 50.1 CM
ASCENDING AORTA: 3.4 CM
AV MEAN GRADIENT: 13 MMHG
AV PEAK GRADIENT: 25 MMHG
AV PEAK VELOCITY-S: 2.52 M/S
AV VALVE AREA INDEX: 0.65
AV VALVE AREA: 1.16 CM2
AV VELOCITY RATIO: 0.44
AVA (VTI): 1.39 CM2
BSA FOR ECHO PROCEDURE: 2.13 M2
DOP CALC LVOT STROKE VOLUME: 69.71 CM3
E WAVE DECELERATION TIME: 310 MS
E/A RATIO: 0.6
E/E' RATIO: 15.1
E/LAT E' RATIO: 7.3
EDV (BP): 47.7 CM3
EF (A4C): 47.8 %
EF A2C: 51.8 %
EJECTION FRACTION: 49.1 %
ESV (BP): 24.3 CM3
FRACTIONAL SHORTENING: 17.65 %
INTERVENTRICULAR SEPTUM: 1.22 CM
LA/AORTA RATIO: 1.62
LAAS-AP4: 16.9 CM2
LAD 2D: 5.5 CM
LALD A4C: 6.53 CM
LEFT ATRIUM VOLUME INDEX: 16.1 CM3/M2
LEFT ATRIUM VOLUME: 34.3 CM3
LEFT INTERNAL DIMENSION IN SYSTOLE: 2.52 CM (ref 3–4.54)
LEFT VENTRICLE DIASTOLIC VOLUME INDEX: 21.22 CM3/M2
LEFT VENTRICLE DIASTOLIC VOLUME: 45.2 CM3
LEFT VENTRICLE SYSTOLIC VOLUME INDEX: 11.08 CM3/M2
LEFT VENTRICLE SYSTOLIC VOLUME: 23.6 CM3
LEFT VENTRICULAR INTERNAL DIMENSION IN DIASTOLE: 3.06 CM (ref 5.11–7.09)
LEFT VENTRICULAR POSTERIOR WALL IN END DIASTOLE: 1.19 CM (ref 0.66–1.23)
LV DIASTOLIC VOLUME: 49.9 CM3
LV ESV (APICAL 2 CHAMBER): 24.2 CM3
LVAD-AP2: 19.8 CM2
LVAD-AP4: 19.4 CM2
LVAS-AP2: 13.1 CM2
LVAS-AP4: 12.7 CM2
LVEDVI(A2C): 23.43 CM3/M2
LVEDVI(BP): 22.39 CM3/M2
LVESVI(A2C): 11.36 CM3/M2
LVESVI(BP): 11.41 CM3/M2
LVLD-AP2: 6.66 CM
LVLD-AP4: 6.52 CM
LVLS-AP2: 5.54 CM
LVLS-AP4: 5.33 CM
LVOT 2D: 2 CM
LVOT A: 3.14 CM2
LVOT MG: 3 MMHG
LVOT MV: 0.77 M/S
LVOT PEAK VELOCITY: 1.19 M/S
LVOT PG: 6 MMHG
LVOT STROKE VOLUME INDEX: 32.73 ML/M2
LVOT VTI: 22.2 CM
MLH CV ECHO AVA INDEX VELOCITY RATIO: 0.5
MV E'TISSUE VEL-LAT: 0.11 M/S
MV E'TISSUE VEL-MED: 0.05 M/S
MV PEAK A VEL: 1.27 M/S
MV PEAK E VEL: 0.8 M/S
POSTERIOR WALL: 1.19 CM
PV PEAK GRADIENT: 4 MMHG
PV PV: 0.99 M/S
RVOT VMAX: 0.83 M/S
RVOT VTI: 14.9 CM
SEPTAL TISSUE DOPPLER FREE WALL LATE DIA VELOCITY (APICAL 4 CHAMBER VIEW): 0.8 M/S
Z-SCORE OF LEFT VENTRICULAR DIMENSION IN END DIASTOLE: -6.76
Z-SCORE OF LEFT VENTRICULAR DIMENSION IN END SYSTOLE: -3.01
Z-SCORE OF LEFT VENTRICULAR POSTERIOR WALL IN END DIASTOLE: 1.48

## 2023-11-09 PROCEDURE — 99214 OFFICE O/P EST MOD 30 MIN: CPT | Performed by: INTERNAL MEDICINE

## 2023-11-09 PROCEDURE — 93000 ELECTROCARDIOGRAM COMPLETE: CPT | Performed by: INTERNAL MEDICINE

## 2023-11-09 PROCEDURE — 93306 TTE W/DOPPLER COMPLETE: CPT | Performed by: INTERNAL MEDICINE

## 2023-11-09 RX ORDER — METOPROLOL SUCCINATE 25 MG/1
25 TABLET, EXTENDED RELEASE ORAL DAILY
Qty: 90 TABLET | Refills: 3 | Status: SHIPPED | OUTPATIENT
Start: 2023-11-09 | End: 2024-08-21 | Stop reason: SDUPTHER

## 2023-11-09 RX ORDER — ROSUVASTATIN CALCIUM 40 MG/1
40 TABLET, COATED ORAL DAILY
Qty: 90 TABLET | Refills: 3 | Status: SHIPPED | OUTPATIENT
Start: 2023-11-09 | End: 2024-05-07 | Stop reason: SDUPTHER

## 2023-11-09 RX ORDER — CYANOCOBALAMIN 1000 UG/ML
INJECTION, SOLUTION INTRAMUSCULAR; SUBCUTANEOUS
COMMUNITY
Start: 2023-10-27

## 2023-11-09 NOTE — ASSESSMENT & PLAN NOTE
Central obesity kyphosis I believe this contributes to her shortness of breath ventilatory restriction

## 2023-11-09 NOTE — ASSESSMENT & PLAN NOTE
2018 after myocardial infarction bypass x4  LIMA to LAD, vein to diagonal vein to marginal 1 vein to marginal 2  Mild aortic stenosis  Echocardiogram today November 2023 stable mild aortic stenosis mean gradient 12 inferolateral akinesis  Ejection fraction 45 to 50%  BNP NORMAL at 30  Blood sugar shortness of breath believes related to underlying COPD uses oxygen at night

## 2024-01-15 ENCOUNTER — APPOINTMENT (RX ONLY)
Dept: URBAN - METROPOLITAN AREA CLINIC 374 | Facility: CLINIC | Age: 84
Setting detail: DERMATOLOGY
End: 2024-01-15

## 2024-01-15 DIAGNOSIS — D18.0 HEMANGIOMA: ICD-10-CM | Status: UNCHANGED

## 2024-01-15 DIAGNOSIS — L81.4 OTHER MELANIN HYPERPIGMENTATION: ICD-10-CM | Status: UNCHANGED

## 2024-01-15 DIAGNOSIS — L82.1 OTHER SEBORRHEIC KERATOSIS: ICD-10-CM | Status: UNCHANGED

## 2024-01-15 DIAGNOSIS — D22 MELANOCYTIC NEVI: ICD-10-CM

## 2024-01-15 DIAGNOSIS — L57.0 ACTINIC KERATOSIS: ICD-10-CM | Status: INADEQUATELY CONTROLLED

## 2024-01-15 DIAGNOSIS — Z85.828 PERSONAL HISTORY OF OTHER MALIGNANT NEOPLASM OF SKIN: ICD-10-CM

## 2024-01-15 DIAGNOSIS — D485 NEOPLASM OF UNCERTAIN BEHAVIOR OF SKIN: ICD-10-CM

## 2024-01-15 PROBLEM — D48.5 NEOPLASM OF UNCERTAIN BEHAVIOR OF SKIN: Status: ACTIVE | Noted: 2024-01-15

## 2024-01-15 PROBLEM — D22.5 MELANOCYTIC NEVI OF TRUNK: Status: ACTIVE | Noted: 2024-01-15

## 2024-01-15 PROBLEM — D18.01 HEMANGIOMA OF SKIN AND SUBCUTANEOUS TISSUE: Status: ACTIVE | Noted: 2024-01-15

## 2024-01-15 PROCEDURE — ? FULL BODY SKIN EXAM

## 2024-01-15 PROCEDURE — ? PREMALIGNANT DESTRUCTION

## 2024-01-15 PROCEDURE — ? COUNSELING

## 2024-01-15 PROCEDURE — 17000 DESTRUCT PREMALG LESION: CPT

## 2024-01-15 PROCEDURE — ? BIOPSY BY SHAVE METHOD

## 2024-01-15 PROCEDURE — 69100 BIOPSY OF EXTERNAL EAR: CPT | Mod: 59

## 2024-01-15 PROCEDURE — ? SUNSCREEN RECOMMENDATIONS

## 2024-01-15 PROCEDURE — 99213 OFFICE O/P EST LOW 20 MIN: CPT | Mod: 25

## 2024-01-15 PROCEDURE — ? PHOTO-DOCUMENTATION

## 2024-01-15 PROCEDURE — 17003 DESTRUCT PREMALG LES 2-14: CPT

## 2024-01-15 PROCEDURE — ? ADDITIONAL NOTES

## 2024-01-15 ASSESSMENT — LOCATION SIMPLE DESCRIPTION DERM
LOCATION SIMPLE: LEFT UPPER ARM
LOCATION SIMPLE: RIGHT EAR
LOCATION SIMPLE: LEFT UPPER BACK
LOCATION SIMPLE: RIGHT FOREARM
LOCATION SIMPLE: SUPERIOR FOREHEAD
LOCATION SIMPLE: LEFT FOREARM
LOCATION SIMPLE: RIGHT SCALP
LOCATION SIMPLE: UPPER BACK
LOCATION SIMPLE: LEFT HAND
LOCATION SIMPLE: LEFT FOREHEAD
LOCATION SIMPLE: RIGHT UPPER BACK
LOCATION SIMPLE: RIGHT FOREHEAD

## 2024-01-15 ASSESSMENT — LOCATION ZONE DERM
LOCATION ZONE: HAND
LOCATION ZONE: EAR
LOCATION ZONE: ARM
LOCATION ZONE: SCALP
LOCATION ZONE: TRUNK
LOCATION ZONE: FACE

## 2024-01-15 ASSESSMENT — LOCATION DETAILED DESCRIPTION DERM
LOCATION DETAILED: RIGHT MID-UPPER BACK
LOCATION DETAILED: LEFT FOREHEAD
LOCATION DETAILED: RIGHT PROXIMAL DORSAL FOREARM
LOCATION DETAILED: RIGHT CENTRAL FRONTAL SCALP
LOCATION DETAILED: RIGHT DISTAL DORSAL FOREARM
LOCATION DETAILED: RIGHT SUPERIOR CRUS OF ANTIHELIX
LOCATION DETAILED: RIGHT FOREHEAD
LOCATION DETAILED: LEFT PROXIMAL DORSAL FOREARM
LOCATION DETAILED: LEFT RADIAL DORSAL HAND
LOCATION DETAILED: LEFT DISTAL POSTERIOR UPPER ARM
LOCATION DETAILED: LEFT SUPERIOR UPPER BACK
LOCATION DETAILED: SUPERIOR THORACIC SPINE
LOCATION DETAILED: SUPERIOR MID FOREHEAD

## 2024-01-15 NOTE — PROCEDURE: ADDITIONAL NOTES
Detail Level: Detailed
Additional Notes: Neoplasm of uncertain behavior of the skin on the right asha of helix, rule out Squamous cell carcinoma (SCC)\\n-Histopathology report obtained on 06/24/20 by Dr. Baker on the right asha of helix (accession #: [id number]) revealed: \"Squamous cell carcinoma; well-differentiated. Note: The neoplasm extends to the base of the specimen. \\n-Patient and patient's primary caretaker (son) affirmed he never had any surgical therapy for that skin cancer \\n-Re-biopsy area today to determine if there is a SCC needing treatment
Render Risk Assessment In Note?: no
Additional Notes: History of Squamous cell carcinoma (SCC) on the left distal posterior upper arm status post electrodessication and curettage (ED&C) performed on 02/03/21 by Dr. Baker

## 2024-01-15 NOTE — PROCEDURE: SUNSCREEN RECOMMENDATIONS
Products Recommended: Recommended using over-the-counter sunscreen SPF 30 or greater, with UVB and UVA protection.\\n\\nAccording to Consumer Reports:\\n\\nThree sunscreens were given the Consumer Reports \"Best Buy\" rating:\\n\\nUp & Up Sport SPF 30\\nNo-Ad with Aloe and Vitamin E SPF 45\\nEquate Baby SPF 50\\n\\nSix others were recommended:\\nBanana Boat Sport Performance SPF 30\\nCoppertone Sport Ultra Sweatproof SPF 30\\nCVS Fast Cover Sport SPF 30\\nWalgreens Sport SPF 50\\nOcean Potion Kids Instant Dry Mist SPF 50\\nBanana Boat Sport Performance 
General Sunscreen Counseling: The nature of sun-induced photo-aging and skin cancers is discussed. Sun avoidance, protective clothing, and the use of 30-SPF sunscreens are advised. Observe closely for skin damage/changes, and call if such occur.\\n\\nAdvised to use daily sunscreen SPF 30 with UVB and UVA protection daily. Apply at least 10 minutes prior to going outside, and reapply every 2 hours outside. Recommended sunblocks with zinc oxide or titanium dioxide (though these tend not to rub in as well).\\n
Detail Level: Generalized

## 2024-01-15 NOTE — PROCEDURE: PREMALIGNANT DESTRUCTION
Consent: The patient's consent was obtained including but not limited to risks of crusting, scabbing, blistering, scarring, darker or lighter pigmentary change, recurrence, incomplete removal and infection.
Render Note In Bullet Format When Appropriate: No
Post-Care Instructions: I reviewed with the patient in detail post-care instructions. Patient is to wear sunprotection, and avoid picking at any of the treated lesions. Pt may apply Vaseline to crusted or scabbing areas.
Method: liquid nitrogen
Detail Level: Detailed

## 2024-01-15 NOTE — PROCEDURE: BIOPSY BY SHAVE METHOD
Detail Level: Detailed
Depth Of Biopsy: dermis
Was A Bandage Applied: Yes
Size Of Lesion In Cm: 0
Biopsy Type: H and E
Biopsy Method: Dermablade
Anesthesia Type: 1% lidocaine with epinephrine
Anesthesia Volume In Cc: 2
Hemostasis: Electrocautery
Wound Care: Petrolatum
Dressing: bandage
Destruction After The Procedure: No
Type Of Destruction Used: Curettage
Curettage Text: The wound bed was treated with curettage after the biopsy was performed.
Cryotherapy Text: The wound bed was treated with cryotherapy after the biopsy was performed.
Electrodesiccation Text: The wound bed was treated with electrodesiccation after the biopsy was performed.
Electrodesiccation And Curettage Text: The wound bed was treated with electrodesiccation and curettage after the biopsy was performed.
Silver Nitrate Text: The wound bed was treated with silver nitrate after the biopsy was performed.
Lab: 6
Consent: Written consent was obtained and risks were reviewed including but not limited to scarring, infection, bleeding, scabbing, incomplete removal, nerve damage and allergy to anesthesia.
Post-Care Instructions: I reviewed with the patient in detail post-care instructions. Patient is to keep the biopsy site dry overnight, and then apply bacitracin twice daily until healed. Patient may apply hydrogen peroxide soaks to remove any crusting.
Notification Instructions: Patient will be notified of biopsy results. However, patient instructed to call the office if not contacted within 2 weeks.
Billing Type: Third-Party Bill
Information: Selecting Yes will display possible errors in your note based on the variables you have selected. This validation is only offered as a suggestion for you. PLEASE NOTE THAT THE VALIDATION TEXT WILL BE REMOVED WHEN YOU FINALIZE YOUR NOTE. IF YOU WANT TO FAX A PRELIMINARY NOTE YOU WILL NEED TO TOGGLE THIS TO 'NO' IF YOU DO NOT WANT IT IN YOUR FAXED NOTE.

## 2024-01-31 ENCOUNTER — APPOINTMENT (OUTPATIENT)
Dept: LAB | Facility: HOSPITAL | Age: 84
End: 2024-01-31
Attending: INTERNAL MEDICINE
Payer: MEDICARE

## 2024-01-31 ENCOUNTER — TRANSCRIBE ORDERS (OUTPATIENT)
Dept: SCHEDULING | Age: 84
End: 2024-01-31

## 2024-01-31 DIAGNOSIS — E11.65 TYPE 2 DIABETES MELLITUS WITH HYPERGLYCEMIA (CMS/HCC): Primary | ICD-10-CM

## 2024-01-31 DIAGNOSIS — E06.3 AUTOIMMUNE THYROIDITIS: ICD-10-CM

## 2024-01-31 DIAGNOSIS — E11.65 TYPE 2 DIABETES MELLITUS WITH HYPERGLYCEMIA (CMS/HCC): ICD-10-CM

## 2024-01-31 LAB
EST. AVERAGE GLUCOSE BLD GHB EST-MCNC: 174 MG/DL
HBA1C MFR BLD: 7.7 %
T4 FREE SERPL-MCNC: 1.38 NG/DL (ref 0.58–1.64)
TSH SERPL DL<=0.05 MIU/L-ACNC: 0.18 MIU/L (ref 0.34–5.6)

## 2024-01-31 PROCEDURE — 83036 HEMOGLOBIN GLYCOSYLATED A1C: CPT

## 2024-01-31 PROCEDURE — 36415 COLL VENOUS BLD VENIPUNCTURE: CPT

## 2024-01-31 PROCEDURE — 84439 ASSAY OF FREE THYROXINE: CPT

## 2024-01-31 PROCEDURE — 84443 ASSAY THYROID STIM HORMONE: CPT

## 2024-02-06 ENCOUNTER — APPOINTMENT (RX ONLY)
Dept: URBAN - METROPOLITAN AREA CLINIC 28 | Facility: CLINIC | Age: 84
Setting detail: DERMATOLOGY
End: 2024-02-06

## 2024-02-06 DIAGNOSIS — Z85.828 PERSONAL HISTORY OF OTHER MALIGNANT NEOPLASM OF SKIN: ICD-10-CM

## 2024-02-06 DIAGNOSIS — L57.0 ACTINIC KERATOSIS: ICD-10-CM

## 2024-02-06 PROCEDURE — ? PREMALIGNANT DESTRUCTION

## 2024-02-06 PROCEDURE — 17000 DESTRUCT PREMALG LESION: CPT

## 2024-02-06 PROCEDURE — ? COUNSELING

## 2024-02-06 PROCEDURE — ? PHOTO-DOCUMENTATION

## 2024-02-06 PROCEDURE — 17003 DESTRUCT PREMALG LES 2-14: CPT

## 2024-02-06 PROCEDURE — 99212 OFFICE O/P EST SF 10 MIN: CPT | Mod: 25

## 2024-02-06 ASSESSMENT — LOCATION ZONE DERM
LOCATION ZONE: EAR
LOCATION ZONE: FINGER
LOCATION ZONE: HAND

## 2024-02-06 ASSESSMENT — LOCATION DETAILED DESCRIPTION DERM
LOCATION DETAILED: LEFT MID RADIAL DORSAL RING FINGER
LOCATION DETAILED: LEFT DORSAL THUMB METACARPOPHALANGEAL JOINT
LOCATION DETAILED: RIGHT DORSAL INDEX METACARPOPHALANGEAL JOINT
LOCATION DETAILED: RIGHT SUPERIOR CRUS OF ANTIHELIX
LOCATION DETAILED: RIGHT CRUS OF HELIX
LOCATION DETAILED: LEFT DORSAL RING METACARPOPHALANGEAL JOINT

## 2024-02-06 ASSESSMENT — LOCATION SIMPLE DESCRIPTION DERM
LOCATION SIMPLE: RIGHT EAR
LOCATION SIMPLE: RIGHT HAND
LOCATION SIMPLE: LEFT RING FINGER
LOCATION SIMPLE: LEFT HAND

## 2024-03-12 ENCOUNTER — TELEPHONE (OUTPATIENT)
Dept: SCHEDULING | Facility: CLINIC | Age: 84
End: 2024-03-12
Payer: MEDICARE

## 2024-03-12 NOTE — TELEPHONE ENCOUNTER
Son, Maximus  called asking fort a 6 month appnt with Dr Rodriguez. Around 5/2024    Anything after 5/6 on a Mon orTues works good.     Please call Maximus at [phone] with appnt details. ty

## 2024-04-22 NOTE — PROGRESS NOTES
Cardiology Note    French Garcia DO William BAKARI Daniel is a 83 y.o. male 1940     Returns for cardiac follow-up and review his lab work he is here with his son  He has a History of myocardial infarction bypass surgery 2018   mild aortic stenosis  He has a mildly depressed ejection fraction last echo   November 2023 ejection fraction 45 to 50%   Mild to moderate aortic stenosis with a mean gradient of 12 inferolateral wall akinesis compatible with old myocardial infarction aortic valve area 1.4 cm²  He has COPD uses oxygen at night   Diabetes   hypothyroidism   obesity   chronic renal insufficiency   hyperlipidemia  Remote history of sigmoid cancer  He told me he had lab work with endocrinology and everything looked good including lipids    Lab work   Jan 2024  TSH 0.18  A1C 7.7    Oct 2023  Creatinine 1.29 potassium 4.1  Hemoglobin A1c 7.2  TSH 0.4    July and August 2023  BNP 30  CBC normal  Glucose 200 hemoglobin A1c 7.6  Creatinine 1.44 potassium 3.7 previous creatinine 1.3  Cholesterol 115 triglycerides 228 HDL 32 LDL 47                  Patient Active Problem List    Diagnosis Date Noted    SOB (shortness of breath) 02/21/2023    Obesity 11/01/2021    CRI (chronic renal insufficiency) 02/03/2020    Anemia 02/03/2020    Nicotine dependence in remission 02/03/2020    Malignant neoplasm of sigmoid colon (CMS/[facility]) 11/06/2018    Iron deficiency anemia 08/02/2018    PAF (paroxysmal atrial fibrillation) (CMS/[facility]) 08/02/2018    History of coronary artery bypass graft 06/11/2018    Nonrheumatic aortic valve stenosis 06/11/2018    Acute ST elevation myocardial infarction (STEMI) (CMS/[facility]) 05/19/2018    Controlled type 2 diabetes mellitus with complication, without long-term current use of insulin (CMS/[facility]) 05/18/2018    Incarcerated hernia 05/18/2018    Hypothyroidism 05/18/2018    Hyperlipidemia 05/18/2018    Nephrolithiasis 05/18/2018       Allergy  Patient has no known allergies.    MED LIST  "  Current Outpatient Medications   Medication Sig Dispense Refill    acetaminophen (TYLENOL) 325 mg tablet 650 mg every 4 (four) hours as needed.        albuterol HFA (VENTOLIN HFA) 90 mcg/actuation inhaler inhale 2 puffs by mouth every 6 hours      aspirin 81 mg enteric coated tablet Take 81 mg by mouth daily.      BD ULTRA-FINE MINI PEN NEEDLE 31 gauge x 3/16\" needle use 4 daily      cyanocobalamin (VITAMIN B-12) 1,000 mcg/mL injection INJECT 1ML INTRAMUSCULARLY ONCE EVERY 4-6 WEEKS BY A PRACTIONER      docusate sodium (COLACE) 100 mg capsule Take 100 mg by mouth once.      hydrocortisone 2.5 % cream as needed.        insulin aspart U-100 (NovoLOG) 100 unit/mL injection Inject under the skin 3 (three) times a day before meals. Pt takes 12 units TID, and an additional 400units as directed      JARDIANCE 25 mg tablet Take 25 mg by mouth once daily.      levothyroxine (SYNTHROID) 200 mcg tablet Take 200 mcg by mouth See admin instr.      metoprolol succinate XL (TOPROL-XL) 25 mg 24 hr tablet Take 1 tablet (25 mg total) by mouth daily. 90 tablet 3    rosuvastatin (CRESTOR) 40 mg tablet Take 1 tablet (40 mg total) by mouth daily. 90 tablet 3    tamsulosin (FLOMAX) 0.4 mg capsule Take 0.4 mg by mouth once daily.      TRESIBA FLEXTOUCH U-200 200 unit/mL (3 mL) pen       triamcinolone (KENALOG) 0.1 % cream APPLY two times a day as needed FOR ANKLE PRURITIS      levothyroxine sodium (TIROSINT) 50 mcg capsule Take 50 mcg by mouth See admin instr. Along with 200mcg         No current facility-administered medications for this visit.        ROS    Objective   Vitals:    05/07/24 1323 05/07/24 1339   BP: (!) 140/80 130/80   BP Location: Left upper arm    Patient Position: Sitting    Pulse: 71    SpO2: 92%    Weight: 103 kg (227 lb)    Height: 1.6 m (5' 3\")          Physical Exam  Constitutional:       General: He is not in acute distress.     Appearance: He is well-developed. He is obese. He is not ill-appearing, " toxic-appearing or diaphoretic.      Interventions: He is not intubated.     Comments: Overweight   HENT:      Head: Normocephalic. Not microcephalic. No raccoon eyes, abrasion or contusion.      Nose: Nose normal.   Eyes:      General: No scleral icterus.        Left eye: No discharge.      Conjunctiva/sclera:      Right eye: Right conjunctiva is not injected.      Left eye: Left conjunctiva is not injected. No exudate or hemorrhage.     Pupils: Pupils are equal.   Neck:      Vascular: Normal carotid pulses. No carotid bruit or hepatojugular reflux.   Cardiovascular:      Rate and Rhythm: Normal rate and regular rhythm.      Chest Wall: PMI is not displaced.      Pulses: Normal pulses and intact distal pulses.      Heart sounds: Normal heart sounds. No murmur heard.     No friction rub. No gallop.      Comments: 1/6 systolic murmur  Pulmonary:      Effort: Pulmonary effort is normal. No tachypnea, bradypnea or respiratory distress. He is not intubated.      Breath sounds: Normal breath sounds. No stridor. No wheezing or rales.   Chest:      Chest wall: No tenderness.   Abdominal:      General: Bowel sounds are normal. There is no distension.      Palpations: Abdomen is soft.      Tenderness: There is no abdominal tenderness.   Musculoskeletal:         General: No deformity. Normal range of motion.      Cervical back: Normal range of motion and neck supple.      Comments: Central obesity kyphosis   Skin:     Coloration: Skin is not pale.      Findings: No abrasion, bruising, ecchymosis, erythema or rash.   Neurological:      Mental Status: He is alert and oriented to person, place, and time.   Psychiatric:         Mood and Affect: Mood is not anxious or depressed. Affect is not labile, blunt, angry or inappropriate.         Speech: He is communicative. Speech is not slurred.         Behavior: Behavior is not agitated, aggressive, withdrawn or combative.       Cardiac Procedures        CATH  5/18/20 90% PROX LAD MID  70%, Om1 80%, INF BRANCH 95% RCA NON OBSTRUCTIVE DISEASE    STRESS  Pharm nuc oct 2021  The EKG does not meet evidence for pharmacologic induced ischemia.  2.  Abnormal nuclear perfusion scan.  Moderate to large inferior and inferior lateral defect which is fixed and consistent with scar in the distribution of the left circumflex coronary artery.  3.  Gated images: Moderate inferior lateral hypokinesis.  The left ventricular ejection fraction is 50%.  Compared to the pharmacologic nuclear stress test from an outside institution dated 7/15/2020: No significant change    CV SURGERY  CABG x 4   5/18 AFTER NONSTEM limA LAD v TO DIAG, v Om1, v Om2  PERIO AFIB     STRESS  Pharm stress testOCT 2021  The EKG does not meet evidence for pharmacologic induced ischemia.  2.  Abnormal nuclear perfusion scan.  Moderate to large inferior and inferior lateral defect which is fixed and consistent with scar in the distribution of the left circumflex coronary artery.  3.  Gated images: Moderate inferior lateral hypokinesis.  The left ventricular ejection fraction is 50%.  Compared to the pharmacologic nuclear stress test from an outside institution dated 7/15/2020: No significant change.    ECHO  Echo nov 2023  Left Ventricle: Normal ventricle size. Mild concentric left ventricular hypertrophy. No outflow tract obstruction present. Estimated EF 45-50%. Inferior lateral akinesis Grade I diastolic dysfunction.    Right Ventricle: Normal ventricle size. Mildly reduced systolic function.    Left Atrium: Normal sized atrium.    Right Atrium: Normal sized atrium.    Aortic Valve: Tricuspid valve.  Calcification present with reduced excursion. Trace regurgitation. Mild stenosis. The peak aortic velocity was measured in the apical view. Peak velocity = 2.52 m/s. Mean gradient = 13.00 mmHg. Calculated area by cont eq = 1.39 cm2. Calculated dimensionless index = 0.44.    Mitral Valve: Normal leaflet structure. Mitral annular calcification. Mild  regurgitation.    Tricuspid Valve: Normal structure. Jet is insufficient to calculate pulmonary artery pressure. Trace regurgitation.    Pulmonic Valve: Normal structure. Trace regurgitation.    Aorta: Aortic root normal. Sinuses of Valsalva normal-sized. Ascending aorta normal-sized.    IVC/SVC: Inferior vena cava is a small caliber vessel (<1.7cm). Inferior vena cava demonstrates normal respiratory collapse.    Pericardium: Normal structure.    No significant change from study of 2021      ELECTROPHYSIOLOGY  paf perio op cabg 5/18  EKG May 2024 nonspecific inferolateral T wave inversions no change  EKG  Nonspecific inferolateral T wave inversions stable from 2023 July 2022 inferolateral T wave inversions unchanged        Assessment/Plan:  History of coronary artery bypass graft  2018 after non-STEMI myocardial infarction coronary bypass x 4  LIMA to LAD, vein to diagonal, vein to marginal, vein to marginal 2  Mild aortic stenosis  Last echocardiogram November 2023 stable mean gradient 12 inferior lateral akinesis ejection fraction 45 to 50%  No clinical evidence of heart failure  Echocardiogram next visit    Controlled type 2 diabetes mellitus with complication, without long-term current use of insulin (CMS/[facility])  Follows with endocrinology on insulin and SGLT2 inhibitor metformin [place] recent lab work endocrinology and everything looked good including lipids I will try to get those results    Nonrheumatic aortic valve stenosis  Last echocardiogram November November 2023 aortic valve area 1.4 cm.  Mean gradient 12 echo next visit mild to moderate aortic stenosis wall motion abnormality ejection fraction 45%    CRI (chronic renal insufficiency)  Creatinine runs 1.3    Hyperlipidemia  The son told me recent cholesterol was good with endocrinology I will try to track down those results last LDL cholesterol at 47 I would not change rosuvastatin dose continue 40 mg again with diffuse vascular disease goal for  his LDL cholesterol under 55     History of CABG myocardial infarction mild to moderate depressed ejection fraction ef of 45%-50% mild aortic stenosis stable on echocardiogram November 2023 He follows with endocrinology for diabetes and hypothyroidism  A1c is improved  Cholesterol at goal  His chronic renal sufficiency    He is obese and I suspect has a component of hypoventilation and restriction  Due to his body habitus  He uses oxygen at night   secondary to his anatomy some underlying COPD from long history of cigarette smoking  BNP in the past has been 30   I made no changes in his cardiac medications I will try to locate his lab work he has admitted endocrinology he was told his cholesterols good follow-up in 6 months with visit and echocardiogram          Fernie Rodrgiuez MD [facility]   5/7/2024  Herminia PearlsFrnech castellon DO

## 2024-05-07 ENCOUNTER — OFFICE VISIT (OUTPATIENT)
Dept: CARDIOLOGY | Facility: CLINIC | Age: 84
End: 2024-05-07
Payer: MEDICARE

## 2024-05-07 VITALS
SYSTOLIC BLOOD PRESSURE: 130 MMHG | HEART RATE: 71 BPM | OXYGEN SATURATION: 92 % | HEIGHT: 63 IN | BODY MASS INDEX: 40.22 KG/M2 | WEIGHT: 227 LBS | DIASTOLIC BLOOD PRESSURE: 80 MMHG

## 2024-05-07 DIAGNOSIS — E78.5 HYPERLIPIDEMIA, UNSPECIFIED HYPERLIPIDEMIA TYPE: ICD-10-CM

## 2024-05-07 DIAGNOSIS — C18.7 MALIGNANT NEOPLASM OF SIGMOID COLON (CMS/HCC): ICD-10-CM

## 2024-05-07 DIAGNOSIS — E11.8 CONTROLLED TYPE 2 DIABETES MELLITUS WITH COMPLICATION, WITHOUT LONG-TERM CURRENT USE OF INSULIN (CMS/HCC): ICD-10-CM

## 2024-05-07 DIAGNOSIS — I35.0 NONRHEUMATIC AORTIC VALVE STENOSIS: Primary | ICD-10-CM

## 2024-05-07 DIAGNOSIS — N18.9 CHRONIC RENAL IMPAIRMENT, UNSPECIFIED CKD STAGE: ICD-10-CM

## 2024-05-07 DIAGNOSIS — Z95.1 HISTORY OF CORONARY ARTERY BYPASS GRAFT: ICD-10-CM

## 2024-05-07 LAB
ATRIAL RATE: 71
P AXIS: 31
PR INTERVAL: 250
QRS DURATION: 96
QT INTERVAL: 402
QTC CALCULATION(BAZETT): 436
R AXIS: 47
T WAVE AXIS: 157
VENTRICULAR RATE: 71

## 2024-05-07 PROCEDURE — 93000 ELECTROCARDIOGRAM COMPLETE: CPT | Performed by: INTERNAL MEDICINE

## 2024-05-07 PROCEDURE — 99214 OFFICE O/P EST MOD 30 MIN: CPT | Performed by: INTERNAL MEDICINE

## 2024-05-07 RX ORDER — ROSUVASTATIN CALCIUM 40 MG/1
40 TABLET, COATED ORAL DAILY
Qty: 90 TABLET | Refills: 3 | Status: SHIPPED | OUTPATIENT
Start: 2024-05-07 | End: 2024-08-21 | Stop reason: SDUPTHER

## 2024-05-07 NOTE — ASSESSMENT & PLAN NOTE
Follows with endocrinology on insulin and SGLT2 inhibitor metformin [place] recent lab work endocrinology and everything looked good including lipids I will try to get those results

## 2024-05-07 NOTE — ASSESSMENT & PLAN NOTE
Last echocardiogram November November 2023 aortic valve area 1.4 cm.  Mean gradient 12 echo next visit mild to moderate aortic stenosis wall motion abnormality ejection fraction 45%

## 2024-05-07 NOTE — ASSESSMENT & PLAN NOTE
2018 after non-STEMI myocardial infarction coronary bypass x 4  LIMA to LAD, vein to diagonal, vein to marginal, vein to marginal 2  Mild aortic stenosis  Last echocardiogram November 2023 stable mean gradient 12 inferior lateral akinesis ejection fraction 45 to 50%  No clinical evidence of heart failure  Echocardiogram next visit

## 2024-05-07 NOTE — ASSESSMENT & PLAN NOTE
The son told me recent cholesterol was good with endocrinology I will try to track down those results last LDL cholesterol at 47 I would not change rosuvastatin dose continue 40 mg again with diffuse vascular disease goal for his LDL cholesterol under 55

## 2024-08-21 ENCOUNTER — TELEPHONE (OUTPATIENT)
Dept: SCHEDULING | Facility: CLINIC | Age: 84
End: 2024-08-21
Payer: MEDICARE

## 2024-08-21 RX ORDER — ROSUVASTATIN CALCIUM 40 MG/1
40 TABLET, COATED ORAL DAILY
Qty: 90 TABLET | Refills: 3 | Status: SHIPPED | OUTPATIENT
Start: 2024-08-21 | End: 2024-12-16 | Stop reason: SDUPTHER

## 2024-08-21 RX ORDER — METOPROLOL SUCCINATE 25 MG/1
25 TABLET, EXTENDED RELEASE ORAL DAILY
Qty: 90 TABLET | Refills: 3 | Status: SHIPPED | OUTPATIENT
Start: 2024-08-21 | End: 2024-12-16 | Stop reason: SDUPTHER

## 2024-08-21 NOTE — TELEPHONE ENCOUNTER
"Medicine Refill Request [facility]    Name of Patient: Maximus Daniel    Caller's name/Relationship: Maximus Daniel      Callback number: [phone]      Medication Name, Dosage, Supply: metoprolol succinate XL (TOPROL-XL) 25 mg 24 hr tablet     rosuvastatin (CRESTOR) 40 mg table     Quantity left: NO refill scripts left // 90 DAY SUPPLY  3 REFILLS     Pharmacy: [facility]    Last Office Visit: 05.07.24  Last Consult Visit: Visit date not found  Last Telemedicine Visit: Visit date not found    Next Appointment: Visit date not found      Current Outpatient Medications:     acetaminophen (TYLENOL) 325 mg tablet, 650 mg every 4 (four) hours as needed.  , Disp: , Rfl:     albuterol HFA (VENTOLIN HFA) 90 mcg/actuation inhaler, inhale 2 puffs by mouth every 6 hours, Disp: , Rfl:     aspirin 81 mg enteric coated tablet, Take 81 mg by mouth daily., Disp: , Rfl:     BD ULTRA-FINE MINI PEN NEEDLE 31 gauge x 3/16\" needle, use 4 daily, Disp: , Rfl:     cyanocobalamin (VITAMIN B-12) 1,000 mcg/mL injection, INJECT 1ML INTRAMUSCULARLY ONCE EVERY 4-6 WEEKS BY A PRACTIONER, Disp: , Rfl:     docusate sodium (COLACE) 100 mg capsule, Take 100 mg by mouth once., Disp: , Rfl:     hydrocortisone 2.5 % cream, as needed.  , Disp: , Rfl:     insulin aspart U-100 (NovoLOG) 100 unit/mL injection, Inject under the skin 3 (three) times a day before meals. Pt takes 12 units TID, and an additional 400units as directed, Disp: , Rfl:     JARDIANCE 25 mg tablet, Take 25 mg by mouth once daily., Disp: , Rfl:     levothyroxine (SYNTHROID) 200 mcg tablet, Take 200 mcg by mouth See admin instr., Disp: , Rfl:     levothyroxine sodium (TIROSINT) 50 mcg capsule, Take 50 mcg by mouth See admin instr. Along with 200mcg  , Disp: , Rfl:     metoprolol succinate XL (TOPROL-XL) 25 mg 24 hr tablet, Take 1 tablet (25 mg total) by mouth daily., Disp: 90 tablet, Rfl: 3    rosuvastatin (CRESTOR) 40 mg tablet, Take 1 tablet (40 mg total) by mouth " daily., Disp: 90 tablet, Rfl: 3    tamsulosin (FLOMAX) 0.4 mg capsule, Take 0.4 mg by mouth once daily., Disp: , Rfl:     TRESIBA FLEXTOUCH U-200 200 unit/mL (3 mL) pen, , Disp: , Rfl:     triamcinolone (KENALOG) 0.1 % cream, APPLY two times a day as needed FOR ANKLE PRURITIS, Disp: , Rfl:       BP Readings from Last 3 Encounters:   05/07/24 130/80   11/09/23 112/65   11/09/23 120/70       Recent Lab results:  Lab Results   Component Value Date    CHOL 152 05/19/2018   ,   Lab Results   Component Value Date    HDL 29 (L) 05/19/2018   ,   Lab Results   Component Value Date    LDLCALC 103 (H) 05/19/2018   ,   Lab Results   Component Value Date    TRIG 101 05/19/2018        Lab Results   Component Value Date    GLUCOSE 94 05/26/2018   ,   Lab Results   Component Value Date    HGBA1C 7.7 (H) 01/31/2024         Lab Results   Component Value Date    CREATININE 1.3 05/26/2018       Lab Results   Component Value Date    TSH 0.18 (L) 01/31/2024

## 2024-08-25 NOTE — PROGRESS NOTES
"Patient reporting BP high at 153/142.  She did retake it, and was \"the same thing.\" Having some tingling in left arm but denies any loss of sensation or numbness.  Tingling started after she took blood pressure.  She does report on-going dull chest pain in center of chest.      Patient denies heart history, but she does have a history of stoke in July of 2023.    Disposition to call 911 and patient verbalizes understanding and agrees with plan.    Nancy Batista RN  [facility]        Reason for Disposition   Sounds like a life-threatening emergency to the triager    Additional Information   Negative: Difficult to awaken or acting confused (e.g., disoriented, slurred speech)   Negative: Severe difficulty breathing (e.g., struggling for each breath, speaks in single words)   Negative: [1] Weakness of the face, arm or leg on one side of the body AND [2] new onset   Negative: [1] Numbness (i.e., loss of sensation) of the face, arm or leg on one side of the body AND [2] new onset   Negative: [1] Chest pain lasts > 5 minutes AND [2] history of heart disease  (i.e., heart attack, bypass surgery, angina, angioplasty, CHF)   Negative: [1] Chest pain AND [2] took nitrogylcerin AND [3] pain was not relieved    Protocols used: High Blood Pressure-A-AH    " Assessment/Plan       1)  STEMI (ST elevation myocardial infarction) (CMS/HCC) ([facility])  - patient is now post op day one CABG x 4 with LIMA- LAD, SVG - Diag, SVG - Om1, SVG - Om2  - he is currently doing well, extubated, has chest tube, is requiring pressors and inotropes  - wean inotropes/pressors per primary team  - currently on amiodarone 1mg/min  - continue lasix - would use IV 40 daily bid to optimize volume and reduce pulmonary edema on cxr  - currently on asa, consider plavix    Subjective   Interval History: none.     Review of Systems   Constitution: Negative for chills and fever.   Eyes: Negative for pain.   Cardiovascular: Negative for chest pain, leg swelling, near-syncope and palpitations.   Respiratory: Negative for cough and shortness of breath.    Gastrointestinal: Negative for nausea and vomiting.   Neurological: Negative for dizziness, headaches and light-headedness.   Psychiatric/Behavioral: Negative for altered mental status.       Scheduled Meds:  aspirin      aspirin 81 mg oral Daily   atorvastatin 80 mg oral Nightly   chlorhexidine 15 mL Mouth/Throat BID   colchicine 0.6 mg oral Daily   docusate sodium 100 mg oral BID   famotidine 20 mg intravenous BID   Or      famotidine 20 mg oral BID   furosemide 40 mg oral BID (9a, 4p)   Followed by      [START ON 5/24/2018] furosemide 40 mg oral Daily   heparin (porcine)      heparin (porcine) 5,000 Units subcutaneous q8h KANDI   insulin aspart U-100 4 Units subcutaneous TID with meals   levothyroxine 200 mcg oral Daily (6:30a)   lidocaine PF      magnesium oxide 400 mg oral BID   magnesium sulfate 2 g intravenous q8h INT   Followed by      magnesium sulfate 2 g intravenous Daily   metoprolol succinate XL 25 mg oral Nightly   Or      metoprolol succinate XL 50 mg oral Nightly   potassium chloride 20 mEq oral BID   senna 1 tablet oral BID     Continuous Infusions:  amiodarone (CORDARONE) IV infusion 1 mg/min Last Rate: 1 mg/min (05/21/18 0800)    dexmedetomidine in 0.9 % NaCl 0.2-1.5 mcg/kg/hr Last Rate: 0.4 mcg/kg/hr (05/21/18 0800)   EPINEPHrine 1 mcg/min Last Rate: 1 mcg/min (05/21/18 0800)   insulin 0-15 Units/hr Last Rate: 8.16 Units/hr (05/21/18 0800)   milrinone 0.25 mcg/kg/min Last Rate: 0.125 mcg/kg/min (05/21/18 0800)   niCARdipine (CARDENE) infusion 2.5-15 mg/hr    norepinephrine 0.5-30 mcg/min Last Rate: 6 mcg/min (05/21/18 0800)   vasopressin (PITRESSIN) infusion 0.04 Units/min Last Rate: 0.04 Units/min (05/21/18 0800)     PRN Meds:.•  acetaminophen  •  albumin human  •  alum-mag hydroxide-simeth  •  [START ON 5/23/2018] bisacodyl  •  calcium chloride  •  oxyCODONE **OR** HYDROmorphone  •  magnesium sulfate  •  meperidine  •  ondansetron ODT **OR** ondansetron  •  potassium chloride  •  sodium chloride 0.9 %  •  sodium chloride 0.9 %  •  sodium chloride 0.9 %    Objective     Vital signs in last 24 hours:    Vitals:    05/21/18 0501 05/21/18 0600 05/21/18 0700 05/21/18 0800   BP:       Pulse: 72 75 70 66   Resp:       Temp:       TempSrc:       SpO2: (!) 91% 93% 94% 96%   Weight:  90.5 kg (199 lb 8.3 oz)     Height:             Intake/Output Summary (Last 24 hours) at 05/21/18 0819  Last data filed at 05/21/18 0800   Gross per 24 hour   Intake          4974.34 ml   Output             4745 ml   Net           229.34 ml       Weights (last 7 days)     Date/Time   Weight   Drug Calculation Weight (Dosing Weight)    05/21/18 0600  90.5 kg (199 lb 8.3 oz)  --    05/20/18 1350  --  90 kg (198 lb 6.6 oz)    05/20/18 1342  90 kg (198 lb 6.6 oz)  --    05/19/18 0845  89.8 kg (198 lb)  --    05/18/18 1717  89.8 kg (198 lb)  --                Physical Exam   Constitutional: He is oriented to person, place, and time. He appears well-developed and well-nourished.   HENT:   Head: Normocephalic and atraumatic.   Eyes: Conjunctivae and EOM are normal. Pupils are equal, round, and reactive to light.   Neck: Normal range of motion. Neck supple. No JVD present.    Cardiovascular: Normal rate and regular rhythm.  Exam reveals friction rub.    Pulmonary/Chest: Effort normal. No respiratory distress. He has no wheezes. He has rales.   Abdominal: Soft. Bowel sounds are normal. He exhibits no distension. There is no tenderness. There is no rebound.   Musculoskeletal: Normal range of motion. He exhibits no edema.   Neurological: He is alert and oriented to person, place, and time.   Skin: Skin is warm and dry.   Psychiatric: He has a normal mood and affect. His behavior is normal. Judgment and thought content normal.       Labs    CBC Results       05/21/18 05/20/18 05/20/18                    0304 2224 1759         WBC 6.23 6.26 5.91         RBC 3.59 (L) 3.55 (L) 3.39 (L)         HGB 9.6 (L) 9.7 (L) 9.0 (L)         HCT 29.7 (L) 29.9 (L) 28.8 (L)         MCV 82.7 (L) 84.2 85.0         MCH 26.7 (L) 27.3 (L) 26.5 (L)         MCHC 32.3 32.4 31.3 (L)          200 196                       CMP Results       05/21/18 05/20/18 05/20/18                    0304 2224 1759          - -         K 3.9 4.5 4.7          3.9           Cl 107 - -         CO2 23 - -         Glucose 153 (H) - -         BUN 28 (H) - -         Creatinine 1.4 (H) - -         Calcium 8.7 (L) - -         Anion Gap 9 - -         EGFR 48.9 (L) - -                       PT PTT Results       05/20/18 05/20/18 05/19/18                    1357 1209 2350         PT 19.1 (H) 20.6 (H) -         INR 1.6 1.7 -         PTT 30 30 48 (H)         Comment for INR at 1357 on 05/20/18:    Moderate Intensity Anticoagulation = 2.0 to 3.0, High Intensity = 2.5 to 3.5    Comment for INR at 1209 on 05/20/18:    Moderate Intensity Anticoagulation = 2.0 to 3.0, High Intensity = 2.5 to 3.5    Comment for PTT at 2350 on 05/19/18:    The Standard Therapeutic Range for Heparin is 68 to 101 seconds.          Troponin I Results       05/20/18 05/19/18 05/19/18                    0528 2350 1549         Troponin I 13.14 () 18.10 ()  19.38 ()         Comment for Troponin I at 0528 on 05/20/18:  Consistent with previous results  Filtered to eliminate fibrin      Comment for Troponin I at 2350 on 05/19/18:  Consistent with previous results      Comment for Troponin I at 1549 on 05/19/18:  Consistent with previous results            No results found for: TSH, Z9DSRPC, B6LEQXR, THYROIDAB        Cardiology Data    Telemetry  Sinus     Catheterization Results:  5/18/18  FINDINGS:  1. 80-90% proximal LAD and 70% mid LAD lesions.   2. 80% stenosis of the superior branch of OM 1 and 95% stenosis of the inferior branch of OM 1 (culprit lesion).  3. Non-obstructive disease in the RCA.      RECOMMENDATIONS:    1. Consult CT surgery to discuss revascularization options.   2. Monitor on heparin gtt in the CCU.   3. Continue with aggressive risk factor modification and medical therapy.   4. Check echocardiogram.     Echo Results:  TTE 5/19/18  · Low normal systolic function. Estimated EF = 50%. Grade I diastolic dysfunction.Hypokinesis of the lateral and inferolateral wall.  · Calcified aortic valve with mild aortic stenosis with mean gradient of 10mmHg.  · Normal-sized right ventricular cavity with preserved systolic function.  · Trace tricuspid valve regurgitation.  · Mild mitral valve regurgitation. Sclerotic mitral valve.  · Mildly dilated left atrium.      Radiology  CXR 5/21  CLINICAL HISTORY: s/p open heart surgery     COMMENT: 1 view of the chest     COMPARISON: Most Recent Prior Chest X Ray.     Lines and tubes: Cardiac monitoring leads overlie the patient.   A [place]-Phuc  catheter is present, tip is obscured by multiple cardiac leads.  Postoperative  changes in the chest.  Previously noted endotracheal tube has been removed.  Lungs: No focal consolidation  Pleura: Small bilateral pleural effusions.  No pneumothorax.  Cardiomediastinal silhouette: Stable     --  IMPRESSION:  Postoperative chest x-ray.        Esvin Plunkett MD   8:19 AM

## 2024-12-13 ENCOUNTER — APPOINTMENT (OUTPATIENT)
Dept: URBAN - METROPOLITAN AREA CLINIC 374 | Facility: CLINIC | Age: 84
Setting detail: DERMATOLOGY
End: 2024-12-13

## 2024-12-13 DIAGNOSIS — B35.8 OTHER DERMATOPHYTOSES: ICD-10-CM | Status: INADEQUATELY CONTROLLED

## 2024-12-13 PROCEDURE — ? PRESCRIPTION MEDICATION MANAGEMENT

## 2024-12-13 PROCEDURE — ? OTC TREATMENT REGIMEN

## 2024-12-13 PROCEDURE — 99213 OFFICE O/P EST LOW 20 MIN: CPT

## 2024-12-13 PROCEDURE — ? COUNSELING

## 2024-12-13 PROCEDURE — ? PRESCRIPTION

## 2024-12-13 RX ORDER — TERBINAFINE HYDROCHLORIDE 250 MG/1
TABLET ORAL QDAY
Qty: 30 | Refills: 1 | Status: ERX | COMMUNITY
Start: 2024-12-13

## 2024-12-13 RX ORDER — BUTENAFINE HYDROCHLORIDE 10 MG/G
CREAM TOPICAL
Qty: 60 | Refills: 3 | Status: ERX | COMMUNITY
Start: 2024-12-13

## 2024-12-13 RX ORDER — TRIAMCINOLONE ACETONIDE 1 MG/G
CREAM TOPICAL
Qty: 454 | Refills: 2 | Status: ERX | COMMUNITY
Start: 2024-12-13

## 2024-12-13 RX ADMIN — TRIAMCINOLONE ACETONIDE: 1 CREAM TOPICAL at 00:00

## 2024-12-13 RX ADMIN — BUTENAFINE HYDROCHLORIDE: 10 CREAM TOPICAL at 00:00

## 2024-12-13 RX ADMIN — TERBINAFINE HYDROCHLORIDE: 250 TABLET ORAL at 00:00

## 2024-12-13 ASSESSMENT — SEVERITY ASSESSMENT: SEVERITY: MODERATE TO SEVERE

## 2024-12-13 ASSESSMENT — LOCATION ZONE DERM
LOCATION ZONE: LEG
LOCATION ZONE: TRUNK

## 2024-12-13 ASSESSMENT — LOCATION SIMPLE DESCRIPTION DERM
LOCATION SIMPLE: ABDOMEN
LOCATION SIMPLE: LEFT THIGH
LOCATION SIMPLE: RIGHT THIGH

## 2024-12-13 ASSESSMENT — LOCATION DETAILED DESCRIPTION DERM
LOCATION DETAILED: RIGHT ANTERIOR PROXIMAL THIGH
LOCATION DETAILED: LEFT ANTERIOR PROXIMAL THIGH
LOCATION DETAILED: PERIUMBILICAL SKIN

## 2024-12-13 NOTE — PROCEDURE: PRESCRIPTION MEDICATION MANAGEMENT
Initiate Treatment: -Terbinafine HCl 250 mg tablet: take 1 tab po daily for 3-4 weeks\\n\\n-Butenafine 1 % topical cream: apply to affected skin 2-3 times/day for 6-8 weeks\\n\\n-Triamcinolone acetonide 0.1 % topical ointment: apply to affected skin twice a day for no more than 2-3 weeks \\n
Detail Level: Zone
Plan: Re-evaluate in 4-6 weeks
Render In Strict Bullet Format?: No

## 2024-12-13 NOTE — PROCEDURE: OTC TREATMENT REGIMEN
Detail Level: Zone
Patient Specific Otc Recommendations (Will Not Stick From Patient To Patient): desitin maximum strength

## 2024-12-16 ENCOUNTER — HOSPITAL ENCOUNTER (OUTPATIENT)
Dept: CARDIOLOGY | Facility: CLINIC | Age: 84
Discharge: HOME | End: 2024-12-16
Attending: INTERNAL MEDICINE
Payer: MEDICARE

## 2024-12-16 ENCOUNTER — OFFICE VISIT (OUTPATIENT)
Dept: CARDIOLOGY | Facility: CLINIC | Age: 84
End: 2024-12-16
Payer: MEDICARE

## 2024-12-16 VITALS
OXYGEN SATURATION: 96 % | HEIGHT: 63 IN | SYSTOLIC BLOOD PRESSURE: 135 MMHG | DIASTOLIC BLOOD PRESSURE: 70 MMHG | WEIGHT: 212 LBS | HEART RATE: 63 BPM | BODY MASS INDEX: 37.56 KG/M2

## 2024-12-16 VITALS
DIASTOLIC BLOOD PRESSURE: 70 MMHG | HEIGHT: 63 IN | SYSTOLIC BLOOD PRESSURE: 115 MMHG | WEIGHT: 212 LBS | BODY MASS INDEX: 37.56 KG/M2

## 2024-12-16 DIAGNOSIS — I48.91 POSTOPERATIVE ATRIAL FIBRILLATION (CMS/HCC): ICD-10-CM

## 2024-12-16 DIAGNOSIS — N18.9 CHRONIC RENAL IMPAIRMENT, UNSPECIFIED CKD STAGE: ICD-10-CM

## 2024-12-16 DIAGNOSIS — I35.0 NONRHEUMATIC AORTIC VALVE STENOSIS: ICD-10-CM

## 2024-12-16 DIAGNOSIS — I97.89 POSTOPERATIVE ATRIAL FIBRILLATION (CMS/HCC): ICD-10-CM

## 2024-12-16 DIAGNOSIS — I25.10 CORONARY ARTERY DISEASE INVOLVING NATIVE CORONARY ARTERY OF NATIVE HEART WITHOUT ANGINA PECTORIS: Primary | ICD-10-CM

## 2024-12-16 DIAGNOSIS — E78.5 HYPERLIPIDEMIA, UNSPECIFIED HYPERLIPIDEMIA TYPE: ICD-10-CM

## 2024-12-16 DIAGNOSIS — E11.8 CONTROLLED TYPE 2 DIABETES MELLITUS WITH COMPLICATION, WITHOUT LONG-TERM CURRENT USE OF INSULIN (CMS/HCC): ICD-10-CM

## 2024-12-16 DIAGNOSIS — Z95.1 HISTORY OF CORONARY ARTERY BYPASS GRAFT: ICD-10-CM

## 2024-12-16 LAB
AORTIC ROOT ANNULUS: 3.3 CM
AORTIC VALVE MEAN VELOCITY: 1.74 M/S
AORTIC VALVE VELOCITY TIME INTEGRAL: 59 CM
ASCENDING AORTA: 2.9 CM
ATRIAL RATE: 57
AV MEAN GRADIENT: 15 MMHG
AV PEAK GRADIENT: 32 MMHG
AV PEAK VELOCITY-S: 2.84 M/S
AV VALVE AREA INDEX: 0.64
AV VALVE AREA: 0.96 CM2
AV VELOCITY RATIO: 0.42
AVA (VTI): 1.32 CM2
BSA FOR ECHO PROCEDURE: 2.07 M2
CUSP SEPARATION: 1.2 CM
DOP CALC LVOT STROKE VOLUME: 77.87 CM3
E WAVE DECELERATION TIME: 267 MS
E/A RATIO: 1
E/E' RATIO: 19.5
E/LAT E' RATIO: 11.6
EDV (BP): 67 CM3
EF (A4C): 66.7 %
EF A2C: 58.1 %
EJECTION FRACTION: 61.6 %
ESV (BP): 25.7 CM3
FRACTIONAL SHORTENING: 25.78 %
INTERVENTRICULAR SEPTUM: 1.08 CM
LA ESV (BP): 43 CM3
LA ESV INDEX (A2C): 14.25 CM3/M2
LA ESV INDEX (BP): 20.77 CM3/M2
LA/AORTA RATIO: 0.82
LAAS-AP2: 14.7 CM2
LAAS-AP4: 21.8 CM2
LAD 2D: 2.7 CM
LALD A4C: 5.98 CM
LALD A4C: 6.53 CM
LAV-S: 29.5 CM3
LEFT ATRIUM VOLUME INDEX: 27.87 CM3/M2
LEFT ATRIUM VOLUME: 57.7 CM3
LEFT INTERNAL DIMENSION IN SYSTOLE: 3.31 CM (ref 2.86–4.32)
LEFT VENTRICLE DIASTOLIC VOLUME INDEX: 38.02 CM3/M2
LEFT VENTRICLE DIASTOLIC VOLUME: 78.7 CM3
LEFT VENTRICLE SYSTOLIC VOLUME INDEX: 12.66 CM3/M2
LEFT VENTRICLE SYSTOLIC VOLUME: 26.2 CM3
LEFT VENTRICULAR INTERNAL DIMENSION IN DIASTOLE: 4.46 CM (ref 4.85–6.74)
LEFT VENTRICULAR POSTERIOR WALL IN END DIASTOLE: 1.12 CM (ref 0.63–1.17)
LV DIASTOLIC VOLUME: 55.8 CM3
LV ESV (APICAL 2 CHAMBER): 23.4 CM3
LVAD-AP2: 21.5 CM2
LVAD-AP4: 24.9 CM2
LVAS-AP2: 12 CM2
LVAS-AP4: 12.9 CM2
LVEDVI(A2C): 26.96 CM3/M2
LVEDVI(BP): 32.37 CM3/M2
LVESVI(A2C): 11.3 CM3/M2
LVESVI(BP): 12.42 CM3/M2
LVLD-AP2: 7.04 CM
LVLD-AP4: 6.79 CM
LVLS-AP2: 5.15 CM
LVLS-AP4: 5.69 CM
LVOT 2D: 2 CM
LVOT A: 3.14 CM2
LVOT MG: 3 MMHG
LVOT MV: 0.82 M/S
LVOT PEAK VELOCITY: 1.11 M/S
LVOT PG: 5 MMHG
LVOT STROKE VOLUME INDEX: 37.62 ML/M2
LVOT VTI: 24.8 CM
MLH CV ECHO AVA INDEX VELOCITY RATIO: 0.5
MV E'TISSUE VEL-LAT: 0.08 M/S
MV E'TISSUE VEL-MED: 0.05 M/S
MV PEAK A VEL: 1.01 M/S
MV PEAK E VEL: 0.97 M/S
MV STENOSIS PRESSURE HALF TIME: 78 MS
MV VALVE AREA P 1/2 METHOD: 2.82 CM2
P AXIS: 44
POSTERIOR WALL: 1.12 CM
PR INTERVAL: 252
QRS DURATION: 90
QT INTERVAL: 444
QTC CALCULATION(BAZETT): 432
R AXIS: 64
RVOT VMAX: 0.69 M/S
SEPTAL TISSUE DOPPLER FREE WALL LATE DIA VELOCITY (APICAL 4 CHAMBER VIEW): 0.07 M/S
T WAVE AXIS: 233
VENTRICULAR RATE: 57
Z-SCORE OF LEFT VENTRICULAR DIMENSION IN END DIASTOLE: -2.48
Z-SCORE OF LEFT VENTRICULAR DIMENSION IN END SYSTOLE: -0.47
Z-SCORE OF LEFT VENTRICULAR POSTERIOR WALL IN END DIASTOLE: 1.38

## 2024-12-16 PROCEDURE — 93000 ELECTROCARDIOGRAM COMPLETE: CPT | Performed by: STUDENT IN AN ORGANIZED HEALTH CARE EDUCATION/TRAINING PROGRAM

## 2024-12-16 PROCEDURE — 99214 OFFICE O/P EST MOD 30 MIN: CPT | Performed by: STUDENT IN AN ORGANIZED HEALTH CARE EDUCATION/TRAINING PROGRAM

## 2024-12-16 PROCEDURE — 93306 TTE W/DOPPLER COMPLETE: CPT | Performed by: STUDENT IN AN ORGANIZED HEALTH CARE EDUCATION/TRAINING PROGRAM

## 2024-12-16 PROCEDURE — G2211 COMPLEX E/M VISIT ADD ON: HCPCS | Performed by: STUDENT IN AN ORGANIZED HEALTH CARE EDUCATION/TRAINING PROGRAM

## 2024-12-16 RX ORDER — ROSUVASTATIN CALCIUM 40 MG/1
40 TABLET, COATED ORAL DAILY
Qty: 90 TABLET | Refills: 3 | Status: SHIPPED | OUTPATIENT
Start: 2024-12-16

## 2024-12-16 RX ORDER — METOPROLOL SUCCINATE 25 MG/1
25 TABLET, EXTENDED RELEASE ORAL DAILY
Qty: 90 TABLET | Refills: 3 | Status: SHIPPED | OUTPATIENT
Start: 2024-12-16

## 2024-12-16 RX ORDER — DICLOFENAC SODIUM 10 MG/G
GEL TOPICAL AS NEEDED
COMMUNITY
Start: 2024-12-11

## 2024-12-16 RX ORDER — TERBINAFINE HYDROCHLORIDE 250 MG/1
250 TABLET ORAL DAILY
COMMUNITY
Start: 2024-12-13

## 2024-12-16 ASSESSMENT — ENCOUNTER SYMPTOMS
JOINT SWELLING: 0
FATIGUE: 0
AGITATION: 0
PALPITATIONS: 0
DIAPHORESIS: 0
EYE DISCHARGE: 0
SHORTNESS OF BREATH: 0
HEMATURIA: 0
EYE REDNESS: 0
DYSURIA: 0
VOMITING: 0
FACIAL SWELLING: 0
HALLUCINATIONS: 0
ABDOMINAL PAIN: 0
LIGHT-HEADEDNESS: 0
COLOR CHANGE: 0
MYALGIAS: 0
STRIDOR: 0

## 2024-12-16 NOTE — PROGRESS NOTES
Pola Sevilla MD, [facility]  Non-invasive Cardiology    [facility]  [facility] [place]  [address] [place] [address]     [facility]  [address]  [address]  [place] [address]    [phone]    [facility]       2024        Patient Name: Maximus Daniel  Account:          [id number]  :               1940        Dear French Garcia, DO:     I had the pleasure of seeing your patient, Maximus Daniel, on 2024. As you know, he is a 84 y.o. male with medical history as described below.     HPI  84 y.o. male with a history of CAD s/p CABG (LIMA-LAD, SVG-diagonal, SVG-OM1, SVG-OM2 - 2018), AS (mild), hyperlipidemia, CKD, and diabetes who presents for cardiovascular evaluation. At today's visit the patient reports feeling well.  They deny having any chest pains, shortness of breath, or other anginal equivalents. They also deny having any palpitations or fast irregular heartbeats nor any episodes of presyncope/syncope.  They remain compliant with all their medications and have no major complaints for today.        CARDIOVASCULAR STUDIES:     Lab Results   Component Value Date    CHOL 152 2018    TRIG 101 2018    HDL 29 (L) 2018    LDLCALC 103 (H) 2018    NONHDLCALC 123 2018       ECG: Independently reviewed: Sinus bradycardia with first-degree AV block, lateral ST/T wave abnormality     ECHOCARDIOGRAM:  Results for orders placed during the hospital encounter of 23    Transthoracic echo (TTE) complete    Interpretation Summary    Left Ventricle: Normal ventricle size. Mild concentric left ventricular hypertrophy. No outflow tract obstruction present. Estimated EF 45-50%. Inferior lateral akinesis Grade I diastolic dysfunction.    Right Ventricle: Normal ventricle size. Mildly reduced systolic function.    Left Atrium: Normal sized atrium.    Right Atrium: Normal sized atrium.     Aortic Valve: Tricuspid valve.  Calcification present with reduced excursion. Trace regurgitation. Mild stenosis. The peak aortic velocity was measured in the apical view. Peak velocity = 2.52 m/s. Mean gradient = 13.00 mmHg. Calculated area by cont eq = 1.39 cm2. Calculated dimensionless index = 0.44.    Mitral Valve: Normal leaflet structure. Mitral annular calcification. Mild regurgitation.    Tricuspid Valve: Normal structure. Jet is insufficient to calculate pulmonary artery pressure. Trace regurgitation.    Pulmonic Valve: Normal structure. Trace regurgitation.    Aorta: Aortic root normal. Sinuses of Valsalva normal-sized. Ascending aorta normal-sized.    IVC/SVC: Inferior vena cava is a small caliber vessel (<1.7cm). Inferior vena cava demonstrates normal respiratory collapse.    Pericardium: Normal structure.    No significant change from study of 2021      STRESS TESTS:     Results for orders placed during the hospital encounter of 10/18/21    CV Nuclear Cardiology MPI Pharm Stress    Interpretation Summary  1.  The EKG does not meet evidence for pharmacologic induced ischemia.  2.  Abnormal nuclear perfusion scan.  Moderate to large inferior and inferior lateral defect which is fixed and consistent with scar in the distribution of the left circumflex coronary artery.  3.  Gated images: Moderate inferior lateral hypokinesis.  The left ventricular ejection fraction is 50%.  Compared to the pharmacologic nuclear stress test from an outside institution dated 7/15/2020: No significant change.    CAROTID ULTRASOUND:  Results for orders placed during the hospital encounter of 05/18/18    Ultrasound carotid bilateral    Narrative  CLINICAL HISTORY: Coronary artery disease.    COMMENT: Ultrasound examination of the carotid arteries is performed utilizing  gray scale, color, and pulsed Doppler sonography. There are no prior  examinations available for comparison.    Right: There is moderate plaque noted within the  right carotid vessels. There is  no evidence for hemodynamically significant stenosis. The peak systolic velocity  within the right internal carotid artery is 126 cm/sec with end-diastolic  velocity of 22 cm/sec and ICA/CCA ratio of 1.1. There is antegrade flow within  the right vertebral artery.    Left: There is mild plaque within the left carotid vessels. There is no evidence  for hemodynamically significant stenosis. The peak systolic velocity within the  left internal carotid artery is 121 cm/sec with end-diastolic velocity of 28  cm/sec and ICA/CCA ratio of 1.2. There is antegrade flow within the left  vertebral artery.    CAROTID DUPLEX CRITERIA:  Measurement of carotid stenosis is based on velocity  parameters that correlate the residual internal carotid diameter with North  American Symptomatic Carotid Endarterectomy Trial (NASCET)-based stenosis  levels.    DEGREE                 ICA             Est Plaque%       ICA/CCA  ECA/EDV  Normal                     <20%               None                  <2.0  <40  <50, mild                 <125                <50                    <2.0  <40  50-69,moderate      125-230          >50                     2-4    >70,<occlusive       Severe >230    >50                    >4  >100  Near occlusion        Variable            Visible >90        Variable  Variable  Total occlusion        Undetectable    No lumen          N/A  N/A    --    Impression  1. No evidence for hemodynamically significant stenosis within the bilateral  carotid arteries.  2. Bilateral antegrade flow within the vertebral arteries.      CARDIAC CATH:  Results for orders placed during the hospital encounter of 05/18/18    Cardiac catheterization    Conclusion  FINDINGS:  1. 80-90% proximal LAD and 70% mid LAD lesions.  2. 80% stenosis of the superior branch of OM 1 and 95% stenosis of the inferior branch of OM 1 (culprit lesion).  3. Non-obstructive disease in the RCA.    RECOMMENDATIONS:  1.  Consult CT surgery to discuss revascularization options.  2. Monitor on heparin gtt in the CCU.  3. Continue with aggressive risk factor modification and medical therapy.  4. Check echocardiogram.        Past Medical History:   Diagnosis Date    Anemia     Coronary artery disease     Diabetes mellitus type I (CMS/HCC)     Hypertension     Hypothyroidism     Incarcerated hernia     Lipid disorder     Myocardial infarction (CMS/HCC)     Nephrolithiasis         Past Surgical History   Procedure Laterality Date    Adenoidectomy      CABG, POSS LIMA, RAMA, RAD ARTERY, EVH, MADELINE N/A 5/20/2018    [place] [facility] [place]    Coronary angiography N/A 5/18/2018    [place] Rg [place] [facility] [place]    Fracture surgery      Joint replacement      Skin biopsy      Skin cancer excision      Tonsillectomy      Total shoulder replacement Left 02/2021        Family History   Problem Relation Name Age of Onset    Heart disease Biological Mother      Suicide Attempts Biological Father      Depression Biological Father      Heart disease Biological Brother          Social History[1]     Current Medications[2]     Allergies:  Patient has no known allergies.       Review of Systems   Constitutional:  Negative for diaphoresis and fatigue.   HENT:  Negative for facial swelling and nosebleeds.    Eyes:  Negative for discharge and redness.   Respiratory:  Negative for shortness of breath and stridor.    Cardiovascular:  Negative for chest pain and palpitations.   Gastrointestinal:  Negative for abdominal pain and vomiting.   Genitourinary:  Negative for dysuria and hematuria.   Musculoskeletal:  Negative for joint swelling and myalgias.   Skin:  Negative for color change and rash.   Neurological:  Negative for syncope and light-headedness.   Psychiatric/Behavioral:  Negative for agitation and hallucinations.         Vitals:    12/16/24 1430 12/16/24 1456   BP: (!) 150/70 135/70   BP Location: Left  "upper arm    Patient Position: Sitting    Pulse: 63    SpO2: 96%    Weight: 96.2 kg (212 lb)    Height: 1.6 m (5' 3\")      Body mass index is 37.55 kg/m².     Physical Exam  Constitutional:       General: He is not in acute distress.     Appearance: Normal appearance. He is not ill-appearing.   HENT:      Head: Normocephalic and atraumatic.      Nose: Nose normal.   Eyes:      General:         Right eye: No discharge.         Left eye: No discharge.      Conjunctiva/sclera: Conjunctivae normal.   Cardiovascular:      Rate and Rhythm: Normal rate and regular rhythm.      Heart sounds: Murmur (2/6 ALCIRA at RUSB) heard.      No friction rub. No gallop.   Pulmonary:      Effort: No respiratory distress.      Breath sounds: No stridor. No wheezing, rhonchi or rales.   Abdominal:      General: There is no distension.      Tenderness: There is no guarding.   Musculoskeletal:      Right lower leg: No edema.      Left lower leg: No edema.   Skin:     General: Skin is warm and dry.   Neurological:      Mental Status: He is alert. Mental status is at baseline.   Psychiatric:         Mood and Affect: Mood normal.         Behavior: Behavior normal.             Diagnosis Plan   1. Coronary artery disease involving native coronary artery of native heart without angina pectoris        2. Nonrheumatic aortic valve stenosis  [facility] MUSE ECG 12 lead (clinic performed)      3. History of coronary artery bypass graft  [facility] MUSE ECG 12 lead (clinic performed)      4. Controlled type 2 diabetes mellitus with complication, without long-term current use of insulin (CMS/[facility])        5. Hyperlipidemia, unspecified hyperlipidemia type        6. Chronic renal impairment, unspecified CKD stage        7. Postoperative atrial fibrillation (CMS/[facility])        I attest that this visit supports the complexity inherent to evaluation and management associated with medical care services that serve as the continuing focal point for all needed health care " services and/or medical care services that are part of ongoing care related to this patient's single, serious condition or a complex condition.      ASSESSMENT AND PLAN:     84 y.o. male with a history of CAD s/p CABG (LIMA-LAD, SVG-diagonal, SVG-OM1, SVG-OM2 - 2018), AS (mild), hyperlipidemia, CKD, and diabetes who presents for cardiovascular evaluation.     #CAD s/p CABG (LIMA-LAD, SVG-diagonal, SVG-OM1, SVG-OM2 - 2018)  Stable, free of anginal symptomatology.  Continue aspirin 81 mg daily and rosuvastatin 40 mg daily.     #Hx of post-op afib  Listed history of post-op afib following CABG, no reported recurrence, patient has not been maintained on AC.     #AS (mild)  Stable on most recent echocardiogram     #HLD  Statin as above     #HTN  Continue metoprolol 25mg daily    #CKD  Management per PCP     #DM  Management per PCP     Return in about 6 months (around 6/16/2025).     I thank you for the opportunity to take part in the care of Maximus Daniel.  Please do not hesitate to contact me with any questions or concerns.     Sincerely,  Pola Sevilla MD  Cardiovascular Disease  12/16/2024      This note was generated using speech recognition software. Please excuse any typographical errors. Please contact me with any questions or concerns.         [1]   Social History  Socioeconomic History    Marital status:      Spouse name: None    Number of children: None    Years of education: None    Highest education level: None   Occupational History    Occupation: retired     Occupation: retired papermill    Tobacco Use    Smoking status: Former     Current packs/day: 0.10     Average packs/day: 0.1 packs/day for 65.0 years (6.5 ttl pk-yrs)     Types: Cigarettes, Other (see comments)    Smokeless tobacco: Never   Vaping Use    Vaping status: Never Used   Substance and Sexual Activity    Alcohol use: No    Drug use: No    Sexual activity: Never   Social History Narrative    Lives alone in an  "apartment/efficiency.  Does have elevator.  Daughter is local and is power of    [2]   Current Outpatient Medications   Medication Sig Dispense Refill    acetaminophen (TYLENOL) 325 mg tablet 650 mg every 4 (four) hours as needed.        albuterol HFA (VENTOLIN HFA) 90 mcg/actuation inhaler inhale 2 puffs by mouth every 6 hours      aspirin 81 mg enteric coated tablet Take 81 mg by mouth daily.      BD ULTRA-FINE MINI PEN NEEDLE 31 gauge x 3/16\" needle use 4 daily      cyanocobalamin (VITAMIN B-12) 1,000 mcg/mL injection INJECT 1ML INTRAMUSCULARLY ONCE EVERY 4-6 WEEKS BY A PRACTIONER      diclofenac sodium (VOLTAREN) 1 % topical gel Apply topically as needed.      docusate sodium (COLACE) 100 mg capsule Take 100 mg by mouth once.      hydrocortisone 2.5 % cream as needed.        insulin aspart U-100 (NovoLOG) 100 unit/mL injection Inject under the skin 3 (three) times a day before meals. Pt takes 10 units TID, and an additional 400units as directed      JARDIANCE 25 mg tablet Take 25 mg by mouth once daily.      levothyroxine (SYNTHROID) 200 mcg tablet Take 200 mcg by mouth See admin instr.      metoprolol succinate XL (TOPROL-XL) 25 mg 24 hr tablet Take 1 tablet (25 mg total) by mouth daily. 90 tablet 3    rosuvastatin (CRESTOR) 40 mg tablet Take 1 tablet (40 mg total) by mouth daily. 90 tablet 3    tamsulosin (FLOMAX) 0.4 mg capsule Take 0.4 mg by mouth once daily.      terbinafine (LamiSIL) 250 mg tablet Take 250 mg by mouth daily.      TRESIBA FLEXTOUCH U-200 200 unit/mL (3 mL) pen       triamcinolone (KENALOG) 0.1 % cream APPLY two times a day as needed FOR ANKLE PRURITIS      levothyroxine sodium (TIROSINT) 50 mcg capsule Take 50 mcg by mouth See admin instr. Along with 200mcg         No current facility-administered medications for this visit.     "

## 2025-01-22 ENCOUNTER — APPOINTMENT (OUTPATIENT)
Dept: URBAN - METROPOLITAN AREA CLINIC 374 | Facility: CLINIC | Age: 85
Setting detail: DERMATOLOGY
End: 2025-01-22

## 2025-01-22 DIAGNOSIS — B35.8 OTHER DERMATOPHYTOSES: ICD-10-CM | Status: RESOLVED

## 2025-01-22 DIAGNOSIS — B35.6 TINEA CRURIS: ICD-10-CM

## 2025-01-22 PROCEDURE — 99213 OFFICE O/P EST LOW 20 MIN: CPT

## 2025-01-22 PROCEDURE — ? PRESCRIPTION

## 2025-01-22 PROCEDURE — ? PRESCRIPTION MEDICATION MANAGEMENT

## 2025-01-22 PROCEDURE — ? COUNSELING

## 2025-01-22 RX ORDER — CLOTRIMAZOLE AND BETAMETHASONE DIPROPIONATE 10; .5 MG/G; MG/G
CREAM TOPICAL BID
Qty: 45 | Refills: 3 | Status: ERX

## 2025-01-22 ASSESSMENT — LOCATION SIMPLE DESCRIPTION DERM
LOCATION SIMPLE: LEFT THIGH
LOCATION SIMPLE: RIGHT THIGH
LOCATION SIMPLE: ABDOMEN

## 2025-01-22 ASSESSMENT — LOCATION DETAILED DESCRIPTION DERM
LOCATION DETAILED: PERIUMBILICAL SKIN
LOCATION DETAILED: LEFT ANTERIOR PROXIMAL THIGH
LOCATION DETAILED: RIGHT ANTERIOR PROXIMAL THIGH

## 2025-01-22 ASSESSMENT — LOCATION ZONE DERM
LOCATION ZONE: TRUNK
LOCATION ZONE: LEG

## 2025-01-22 NOTE — PROCEDURE: PRESCRIPTION MEDICATION MANAGEMENT
Detail Level: Zone
Render In Strict Bullet Format?: No
Discontinue Regimen: Terbinafine HCl 250 mg tablet: take 1 tab po daily for 3-4 weeks\\n\\n-Butenafine 1 % topical cream: apply to affected skin 2-3 times/day for 6-8 weeks\\n\\n-Triamcinolone acetonide 0.1 % topical ointment: apply to affected skin twice a day for no more than 2-3 weeks
Initiate Treatment: clotrimazole-betamethasone 1 %-0.05 % topical cream Apply a thin layer to AA of groin BID

## 2025-03-19 ENCOUNTER — APPOINTMENT (OUTPATIENT)
Dept: URBAN - METROPOLITAN AREA CLINIC 374 | Facility: CLINIC | Age: 85
Setting detail: DERMATOLOGY
End: 2025-03-19

## 2025-03-19 DIAGNOSIS — B35.6 TINEA CRURIS: ICD-10-CM

## 2025-03-19 DIAGNOSIS — B35.8 OTHER DERMATOPHYTOSES: ICD-10-CM | Status: RESOLVED

## 2025-03-19 DIAGNOSIS — L82.1 OTHER SEBORRHEIC KERATOSIS: ICD-10-CM

## 2025-03-19 DIAGNOSIS — L57.0 ACTINIC KERATOSIS: ICD-10-CM

## 2025-03-19 PROCEDURE — 99213 OFFICE O/P EST LOW 20 MIN: CPT | Mod: 25

## 2025-03-19 PROCEDURE — 17000 DESTRUCT PREMALG LESION: CPT

## 2025-03-19 PROCEDURE — ? PRESCRIPTION MEDICATION MANAGEMENT

## 2025-03-19 PROCEDURE — 17003 DESTRUCT PREMALG LES 2-14: CPT

## 2025-03-19 PROCEDURE — ? COUNSELING

## 2025-03-19 PROCEDURE — ? LIQUID NITROGEN

## 2025-03-19 PROCEDURE — ? PRESCRIPTION

## 2025-03-19 RX ORDER — MICONAZOLE NITRATE 20 MG/G
1 POWDER TOPICAL
Qty: 71 | Refills: 3 | Status: ERX | COMMUNITY
Start: 2025-03-19

## 2025-03-19 RX ORDER — CLOTRIMAZOLE 1 G/100G
1 CREAM TOPICAL QD
Qty: 45 | Refills: 3 | Status: ERX | COMMUNITY
Start: 2025-03-19

## 2025-03-19 RX ADMIN — MICONAZOLE NITRATE 1: 20 POWDER TOPICAL at 00:00

## 2025-03-19 RX ADMIN — CLOTRIMAZOLE 1: 1 CREAM TOPICAL at 00:00

## 2025-03-19 ASSESSMENT — LOCATION ZONE DERM
LOCATION ZONE: TRUNK
LOCATION ZONE: ARM
LOCATION ZONE: LEG

## 2025-03-19 ASSESSMENT — LOCATION DETAILED DESCRIPTION DERM
LOCATION DETAILED: LEFT PROXIMAL DORSAL FOREARM
LOCATION DETAILED: RIGHT ANTERIOR PROXIMAL THIGH
LOCATION DETAILED: RIGHT VENTRAL PROXIMAL FOREARM
LOCATION DETAILED: LEFT DISTAL RADIAL DORSAL FOREARM
LOCATION DETAILED: RIGHT ANTERIOR DISTAL THIGH
LOCATION DETAILED: RIGHT ANTERIOR DISTAL UPPER ARM
LOCATION DETAILED: PERIUMBILICAL SKIN
LOCATION DETAILED: RIGHT DISTAL DORSAL FOREARM
LOCATION DETAILED: LEFT ANTERIOR DISTAL THIGH
LOCATION DETAILED: LEFT ANTERIOR PROXIMAL THIGH

## 2025-03-19 ASSESSMENT — LOCATION SIMPLE DESCRIPTION DERM
LOCATION SIMPLE: RIGHT FOREARM
LOCATION SIMPLE: RIGHT UPPER ARM
LOCATION SIMPLE: LEFT FOREARM
LOCATION SIMPLE: ABDOMEN
LOCATION SIMPLE: RIGHT THIGH
LOCATION SIMPLE: LEFT THIGH

## 2025-03-19 NOTE — PROCEDURE: PRESCRIPTION MEDICATION MANAGEMENT
Detail Level: Zone
Render In Strict Bullet Format?: No
Discontinue Regimen: Terbinafine HCl 250 mg tablet: take 1 tab po daily for 3-4 weeks\\n\\n-Butenafine 1 % topical cream: apply to affected skin 2-3 times/day for 6-8 weeks\\n\\n-Triamcinolone acetonide 0.1 % topical ointment: apply to affected skin twice a day for no more than 2-3 weeks
Initiate Treatment: clotrimazole 1 % topical cream QD: Apply to AA groin QD for maintenance\\n\\nZeasorb AF 2 % topical powder : Apply to the affected area of the groin QDAY
Discontinue Regimen: clotrimazole-betamethasone 1 %-0.05 % topical cream Apply a thin layer to AA of groin BID

## 2025-03-19 NOTE — PROCEDURE: LIQUID NITROGEN
Consent: The patient's consent was obtained including but not limited to risks of crusting, scabbing, blistering, scarring, darker or lighter pigmentary change, recurrence, incomplete removal and infection.
Number Of Freeze-Thaw Cycles: 1 freeze-thaw cycle
Duration Of Freeze Thaw-Cycle (Seconds): 0
Post-Care Instructions: I reviewed with the patient in detail post-care instructions. Patient is to wear sunprotection, and avoid picking at any of the treated lesions. Pt may apply Vaseline to crusted or scabbing areas.
Detail Level: Simple
Render Post-Care Instructions In Note?: no
Show Aperture Variable?: Yes

## 2025-06-11 ENCOUNTER — APPOINTMENT (OUTPATIENT)
Dept: URBAN - METROPOLITAN AREA CLINIC 374 | Facility: CLINIC | Age: 85
Setting detail: DERMATOLOGY
End: 2025-06-11

## 2025-06-11 DIAGNOSIS — L57.0 ACTINIC KERATOSIS: ICD-10-CM

## 2025-06-11 PROCEDURE — ? COUNSELING

## 2025-06-11 PROCEDURE — ? LIQUID NITROGEN

## 2025-06-11 ASSESSMENT — LOCATION DETAILED DESCRIPTION DERM
LOCATION DETAILED: RIGHT CENTRAL ZYGOMA
LOCATION DETAILED: LEFT RADIAL DORSAL HAND
LOCATION DETAILED: RIGHT CENTRAL TEMPLE
LOCATION DETAILED: LEFT ULNAR DORSAL HAND

## 2025-06-11 ASSESSMENT — LOCATION ZONE DERM
LOCATION ZONE: FACE
LOCATION ZONE: HAND

## 2025-06-11 ASSESSMENT — LOCATION SIMPLE DESCRIPTION DERM
LOCATION SIMPLE: LEFT HAND
LOCATION SIMPLE: RIGHT TEMPLE
LOCATION SIMPLE: RIGHT ZYGOMA

## 2025-06-11 NOTE — PROCEDURE: LIQUID NITROGEN
Show Aperture Variable?: Yes
Duration Of Freeze Thaw-Cycle (Seconds): 0
Render Post-Care Instructions In Note?: no
Post-Care Instructions: I reviewed with the patient in detail post-care instructions. Patient is to wear sunprotection, and avoid picking at any of the treated lesions. Pt may apply Vaseline to crusted or scabbing areas.
Consent: The patient's consent was obtained including but not limited to risks of crusting, scabbing, blistering, scarring, darker or lighter pigmentary change, recurrence, incomplete removal and infection.
Number Of Freeze-Thaw Cycles: 1 freeze-thaw cycle
Detail Level: Simple

## 2025-07-22 ENCOUNTER — OFFICE VISIT (OUTPATIENT)
Dept: CARDIOLOGY | Facility: CLINIC | Age: 85
End: 2025-07-22
Payer: MEDICARE

## 2025-07-22 VITALS
WEIGHT: 222 LBS | SYSTOLIC BLOOD PRESSURE: 126 MMHG | HEART RATE: 63 BPM | BODY MASS INDEX: 39.34 KG/M2 | HEIGHT: 63 IN | DIASTOLIC BLOOD PRESSURE: 68 MMHG | OXYGEN SATURATION: 95 %

## 2025-07-22 DIAGNOSIS — I35.0 NONRHEUMATIC AORTIC VALVE STENOSIS: ICD-10-CM

## 2025-07-22 DIAGNOSIS — I48.91 POSTOPERATIVE ATRIAL FIBRILLATION (CMS/HCC): ICD-10-CM

## 2025-07-22 DIAGNOSIS — I97.89 POSTOPERATIVE ATRIAL FIBRILLATION (CMS/HCC): ICD-10-CM

## 2025-07-22 DIAGNOSIS — N18.9 CHRONIC RENAL IMPAIRMENT, UNSPECIFIED CKD STAGE: ICD-10-CM

## 2025-07-22 DIAGNOSIS — E78.5 HYPERLIPIDEMIA, UNSPECIFIED HYPERLIPIDEMIA TYPE: ICD-10-CM

## 2025-07-22 DIAGNOSIS — Z95.1 HISTORY OF CORONARY ARTERY BYPASS GRAFT: ICD-10-CM

## 2025-07-22 DIAGNOSIS — I25.10 CORONARY ARTERY DISEASE INVOLVING NATIVE CORONARY ARTERY OF NATIVE HEART WITHOUT ANGINA PECTORIS: Primary | ICD-10-CM

## 2025-07-22 LAB
ATRIAL RATE: 70
P AXIS: 57
PR INTERVAL: 264
QRS DURATION: 108
QT INTERVAL: 424
QTC CALCULATION(BAZETT): 457
R AXIS: 59
T WAVE AXIS: 166
VENTRICULAR RATE: 70

## 2025-07-22 PROCEDURE — 99214 OFFICE O/P EST MOD 30 MIN: CPT | Performed by: STUDENT IN AN ORGANIZED HEALTH CARE EDUCATION/TRAINING PROGRAM

## 2025-07-22 PROCEDURE — G2211 COMPLEX E/M VISIT ADD ON: HCPCS | Performed by: STUDENT IN AN ORGANIZED HEALTH CARE EDUCATION/TRAINING PROGRAM

## 2025-07-22 PROCEDURE — 93000 ELECTROCARDIOGRAM COMPLETE: CPT | Performed by: STUDENT IN AN ORGANIZED HEALTH CARE EDUCATION/TRAINING PROGRAM

## 2025-07-22 ASSESSMENT — ENCOUNTER SYMPTOMS
FACIAL SWELLING: 0
AGITATION: 0
COLOR CHANGE: 0
EYE DISCHARGE: 0
SHORTNESS OF BREATH: 0
MYALGIAS: 0
VOMITING: 0
HEMATURIA: 0
LIGHT-HEADEDNESS: 0
STRIDOR: 0
ABDOMINAL PAIN: 0
FATIGUE: 0
HALLUCINATIONS: 0
EYE REDNESS: 0
PALPITATIONS: 0
DIAPHORESIS: 0
JOINT SWELLING: 0
DYSURIA: 0

## 2025-07-22 NOTE — PROGRESS NOTES
Pola Sevilla MD, [facility]  Non-invasive Cardiology    [facility]  [facility] [place]  [address] [place] [address]     [facility]  [address]  [address]  [place] [address]    [phone]    [facility]       2025        Patient Name: Maximus Daniel  Account:          [id number]  :               1940        Dear French Garcia, DO:     I had the pleasure of seeing your patient, Maximus Daniel, on 2025. As you know, he is a 85 y.o. male with medical history as described below.     HPI  85 y.o. male with a history of CAD s/p CABG (LIMA-LAD, SVG-diagonal, SVG-OM1, SVG-OM2 - 2018), AS (mild), hyperlipidemia, CKD, and diabetes who presents for cardiovascular evaluation. At today's visit the patient reports feeling well.  he denies having any chest pains, shortness of breath, or other anginal equivalents. he also denies having any palpitations or fast irregular heartbeats nor any episodes of presyncope/syncope.  he remains compliant with all his medications and has no major complaints for today.       CARDIOVASCULAR STUDIES:     Lab Results   Component Value Date    CHOL 152 2018    TRIG 101 2018    HDL 29 (L) 2018    LDLCALC 103 (H) 2018    NONHDLCALC 123 2018       ECG: Independently reviewed:      ECHOCARDIOGRAM:  Results for orders placed during the hospital encounter of 23    Transthoracic echo (TTE) complete    Interpretation Summary    Left Ventricle: Normal ventricle size. Mild concentric left ventricular hypertrophy. No outflow tract obstruction present. Estimated EF 45-50%. Inferior lateral akinesis Grade I diastolic dysfunction.    Right Ventricle: Normal ventricle size. Mildly reduced systolic function.    Left Atrium: Normal sized atrium.    Right Atrium: Normal sized atrium.    Aortic Valve: Tricuspid valve.  Calcification present with reduced excursion.  Trace regurgitation. Mild stenosis. The peak aortic velocity was measured in the apical view. Peak velocity = 2.52 m/s. Mean gradient = 13.00 mmHg. Calculated area by cont eq = 1.39 cm2. Calculated dimensionless index = 0.44.    Mitral Valve: Normal leaflet structure. Mitral annular calcification. Mild regurgitation.    Tricuspid Valve: Normal structure. Jet is insufficient to calculate pulmonary artery pressure. Trace regurgitation.    Pulmonic Valve: Normal structure. Trace regurgitation.    Aorta: Aortic root normal. Sinuses of Valsalva normal-sized. Ascending aorta normal-sized.    IVC/SVC: Inferior vena cava is a small caliber vessel (<1.7cm). Inferior vena cava demonstrates normal respiratory collapse.    Pericardium: Normal structure.    No significant change from study of 2021      STRESS TESTS:     Results for orders placed during the hospital encounter of 10/18/21    CV Nuclear Cardiology MPI Pharm Stress    Interpretation Summary  1.  The EKG does not meet evidence for pharmacologic induced ischemia.  2.  Abnormal nuclear perfusion scan.  Moderate to large inferior and inferior lateral defect which is fixed and consistent with scar in the distribution of the left circumflex coronary artery.  3.  Gated images: Moderate inferior lateral hypokinesis.  The left ventricular ejection fraction is 50%.  Compared to the pharmacologic nuclear stress test from an outside institution dated 7/15/2020: No significant change.    CAROTID ULTRASOUND:  Results for orders placed during the hospital encounter of 05/18/18    Ultrasound carotid bilateral    Narrative  CLINICAL HISTORY: Coronary artery disease.    COMMENT: Ultrasound examination of the carotid arteries is performed utilizing  gray scale, color, and pulsed Doppler sonography. There are no prior  examinations available for comparison.    Right: There is moderate plaque noted within the right carotid vessels. There is  no evidence for hemodynamically significant  stenosis. The peak systolic velocity  within the right internal carotid artery is 126 cm/sec with end-diastolic  velocity of 22 cm/sec and ICA/CCA ratio of 1.1. There is antegrade flow within  the right vertebral artery.    Left: There is mild plaque within the left carotid vessels. There is no evidence  for hemodynamically significant stenosis. The peak systolic velocity within the  left internal carotid artery is 121 cm/sec with end-diastolic velocity of 28  cm/sec and ICA/CCA ratio of 1.2. There is antegrade flow within the left  vertebral artery.    CAROTID DUPLEX CRITERIA:  Measurement of carotid stenosis is based on velocity  parameters that correlate the residual internal carotid diameter with North  American Symptomatic Carotid Endarterectomy Trial (NASCET)-based stenosis  levels.    DEGREE                 ICA             Est Plaque%       ICA/CCA  ECA/EDV  Normal                     <20%               None                  <2.0  <40  <50, mild                 <125                <50                    <2.0  <40  50-69,moderate      125-230          >50                     2-4    >70,<occlusive       Severe >230    >50                    >4  >100  Near occlusion        Variable            Visible >90        Variable  Variable  Total occlusion        Undetectable    No lumen          N/A  N/A    --    Impression  1. No evidence for hemodynamically significant stenosis within the bilateral  carotid arteries.  2. Bilateral antegrade flow within the vertebral arteries.      CARDIAC CATH:  Results for orders placed during the hospital encounter of 05/18/18    Cardiac catheterization    Conclusion  FINDINGS:  1. 80-90% proximal LAD and 70% mid LAD lesions.  2. 80% stenosis of the superior branch of OM 1 and 95% stenosis of the inferior branch of OM 1 (culprit lesion).  3. Non-obstructive disease in the RCA.    RECOMMENDATIONS:  1. Consult CT surgery to discuss revascularization options.  2. Monitor on  heparin gtt in the CCU.  3. Continue with aggressive risk factor modification and medical therapy.  4. Check echocardiogram.        Past Medical History:   Diagnosis Date    Anemia     Coronary artery disease     Diabetes mellitus type I (CMS/HCC)     Hypertension     Hypothyroidism     Incarcerated hernia     Lipid disorder     Myocardial infarction (CMS/HCC)     Nephrolithiasis         Past Surgical History   Procedure Laterality Date    Adenoidectomy      CABG, POSS LIMA, RAMA, RAD ARTERY, EVH, MADELINE N/A 5/20/2018    Performed by Larisa Slater MD at [facility] [place]    Coronary angiography N/A 5/18/2018    Performed by Ignacio Rg MD at [facility] CARDIAC CATH/EP    Fracture surgery      Joint replacement      Skin biopsy      Skin cancer excision      Tonsillectomy      Total shoulder replacement Left 02/2021        Family History   Problem Relation Name Age of Onset    Heart disease Biological Mother      Suicide Attempts Biological Father      Depression Biological Father      Heart disease Biological Brother          Social History[1]     Current Medications[2]     Allergies:  Patient has no known allergies.       Review of Systems   Constitutional:  Negative for diaphoresis and fatigue.   HENT:  Negative for facial swelling and nosebleeds.    Eyes:  Negative for discharge and redness.   Respiratory:  Negative for shortness of breath and stridor.    Cardiovascular:  Negative for chest pain and palpitations.   Gastrointestinal:  Negative for abdominal pain and vomiting.   Genitourinary:  Negative for dysuria and hematuria.   Musculoskeletal:  Negative for joint swelling and myalgias.   Skin:  Negative for color change and rash.   Neurological:  Negative for syncope and light-headedness.   Psychiatric/Behavioral:  Negative for agitation and hallucinations.         Vitals:    07/22/25 1018   BP: 126/68   BP Location: Left upper arm   Patient Position: Sitting   Pulse: 63   SpO2: 95%   Weight: 101 kg (222 lb)   Height:  "1.6 m (5' 3\")     Body mass index is 39.33 kg/m².     Physical Exam  Constitutional:       General: He is not in acute distress.     Appearance: Normal appearance. He is not ill-appearing.   HENT:      Head: Normocephalic and atraumatic.      Nose: Nose normal.   Eyes:      General:         Right eye: No discharge.         Left eye: No discharge.      Conjunctiva/sclera: Conjunctivae normal.   Neck:      Vascular: No carotid bruit.   Cardiovascular:      Rate and Rhythm: Normal rate and regular rhythm.      Heart sounds: No murmur heard.     No friction rub. No gallop.   Pulmonary:      Effort: No respiratory distress.      Breath sounds: No stridor. No wheezing, rhonchi or rales.   Musculoskeletal:      Right lower leg: No edema.      Left lower leg: No edema.   Skin:     General: Skin is warm and dry.   Neurological:      Mental Status: He is alert.   Psychiatric:         Mood and Affect: Mood normal.         Behavior: Behavior normal.             Diagnosis Plan   1. Coronary artery disease involving native coronary artery of native heart without angina pectoris  [facility] MUSE ECG 12 lead (clinic performed)    Transthoracic echo (TTE) complete    Lipid panel    CBC and differential      2. Hyperlipidemia, unspecified hyperlipidemia type  [facility] MUSE ECG 12 lead (clinic performed)    Transthoracic echo (TTE) complete    Lipid panel    CBC and differential      3. Nonrheumatic aortic valve stenosis  [facility] MUSE ECG 12 lead (clinic performed)    Transthoracic echo (TTE) complete    Lipid panel    CBC and differential      4. Postoperative atrial fibrillation (CMS/HCC)        5. History of coronary artery bypass graft        6. Chronic renal impairment, unspecified CKD stage        I attest that this visit supports the complexity inherent to evaluation and management associated with medical care services that serve as the continuing focal point for all needed health care services and/or medical care services that " are part of ongoing care related to this patient's single, serious condition or a complex condition.      ASSESSMENT AND PLAN:     85 y.o. male with a history of CAD s/p CABG (LIMA-LAD, SVG-diagonal, SVG-OM1, SVG-OM2 - 2018), AS (mild), hyperlipidemia, CKD, and diabetes who presents for cardiovascular evaluation.     #CAD s/p CABG (LIMA-LAD, SVG-diagonal, SVG-OM1, SVG-OM2 - 2018)  Stable, free of anginal symptomatology.  Continue aspirin 81 mg daily and rosuvastatin 40 mg daily.  Check lipid panel at next visit for periodic monitoring.     #Hx of post-op afib  Listed history of post-op afib following CABG, no reported recurrence, patient has not been maintained on AC.     #AS (mild)  Stable on most recent echocardiogram  Check echocardiogram at next visit for periodic monitoring.     #HLD  Statin as above     #HTN  Continue metoprolol 25mg daily    #CKD  Management per PCP     #DM  Management per PCP       Return in about 6 months (around 1/22/2026).     I thank you for the opportunity to take part in the care of Maximus Daniel.  Please do not hesitate to contact me with any questions or concerns.     Sincerely,  Pola Sevilla MD  Cardiovascular Disease  7/22/2025      This note was generated using speech recognition software. Please excuse any typographical errors. Please contact me with any questions or concerns.         [1]   Social History  Socioeconomic History    Marital status:      Spouse name: None    Number of children: None    Years of education: None    Highest education level: None   Occupational History    Occupation: retired     Occupation: retired [employer]    Tobacco Use    Smoking status: Former     Current packs/day: 0.10     Average packs/day: 0.1 packs/day for 65.0 years (6.5 ttl pk-yrs)     Types: Cigarettes, Other (see comments)    Smokeless tobacco: Never   Vaping Use    Vaping status: Never Used   Substance and Sexual Activity    Alcohol use: No    Drug use: No    Sexual  "activity: Never   Social History Narrative    Lives alone in an apartment/efficiency.  Does have elevator.  Daughter is local and is power of    [2]   Current Outpatient Medications   Medication Sig Dispense Refill    acetaminophen (TYLENOL) 325 mg tablet 650 mg every 4 (four) hours as needed.        albuterol HFA (VENTOLIN HFA) 90 mcg/actuation inhaler inhale 2 puffs by mouth every 6 hours      aspirin 81 mg enteric coated tablet Take 81 mg by mouth daily.      BD ULTRA-FINE MINI PEN NEEDLE 31 gauge x 3/16\" needle use 4 daily      cyanocobalamin (VITAMIN B-12) 1,000 mcg/mL injection INJECT 1ML INTRAMUSCULARLY ONCE EVERY 4-6 WEEKS BY A PRACTIONER      docusate sodium (COLACE) 100 mg capsule Take 100 mg by mouth once.      insulin aspart U-100 (NovoLOG) 100 unit/mL injection Inject under the skin 3 (three) times a day before meals. Pt takes 10 units TID, and an additional 400units as directed      levothyroxine (SYNTHROID) 200 mcg tablet Take 200 mcg by mouth See admin instr.      metoprolol succinate XL (TOPROL-XL) 25 mg 24 hr tablet Take 1 tablet (25 mg total) by mouth daily. 90 tablet 3    rosuvastatin (CRESTOR) 40 mg tablet Take 1 tablet (40 mg total) by mouth daily. 90 tablet 3    tamsulosin (FLOMAX) 0.4 mg capsule Take 0.4 mg by mouth once daily.      TRESIBA FLEXTOUCH U-200 200 unit/mL (3 mL) pen       diclofenac sodium (VOLTAREN) 1 % topical gel Apply topically as needed. (Patient not taking: Reported on 7/22/2025)      hydrocortisone 2.5 % cream as needed.   (Patient not taking: Reported on 7/22/2025)      levothyroxine sodium (TIROSINT) 50 mcg capsule Take 50 mcg by mouth See admin instr. Along with 200mcg        terbinafine (LamiSIL) 250 mg tablet Take 250 mg by mouth daily. (Patient not taking: Reported on 7/22/2025)      triamcinolone (KENALOG) 0.1 % cream APPLY two times a day as needed FOR ANKLE PRURITIS (Patient not taking: Reported on 7/22/2025)       No current facility-administered " medications for this visit.

## (undated) DEVICE — GUIDEWIRE VERSACORE MOD J 145CM

## (undated) DEVICE — COVER LIGHTHANDLE (STERILE SINGLE PA

## (undated) DEVICE — GLOVE SZ 8 MICRO PROTEXIS LATEX

## (undated) DEVICE — GOWN SURGICAL REINFORCED X-LAR

## (undated) DEVICE — HEMOSTAT SURGICEL SNOW ABSORB 4 X 4

## (undated) DEVICE — CATH 6FR FL3.5

## (undated) DEVICE — SOLN IRRIG .9%SOD 1000ML

## (undated) DEVICE — SUTURE DEKLENE MAX BL MF 6-0 XT-1 2N 30IN X1 13MM NEEDLE

## (undated) DEVICE — Device

## (undated) DEVICE — CLIP LIGATING MED 24PK HORIZON

## (undated) DEVICE — SOLN PLASMA-LYTE 500M

## (undated) DEVICE — SUTURE PROLENE  4-0  8557H

## (undated) DEVICE — ADHESIVE SKIN DERMABOND ADVANCED 0.7ML

## (undated) DEVICE — SPONGE RAYTEC 8 X 4 16-PLY

## (undated) DEVICE — MISTER BLOWER

## (undated) DEVICE — CATH D 6F FR4 100CM

## (undated) DEVICE — NEEDLE DISP HYPO 22GX1-1/2IN

## (undated) DEVICE — SUTURE VICRYL 3-0 J416H SH UNDYED

## (undated) DEVICE — BLADE MICROSHARP 3MM 15DEG 7513

## (undated) DEVICE — TIP BOVIE BLADE COATED INSULATED 6IN

## (undated) DEVICE — CANNULA VESSEL 3MM TIP W/RADIOPAQUE BAND

## (undated) DEVICE — DRAIN CHEST PLEUREVAC LATEX FREE

## (undated) DEVICE — SUTURE ETHIBOND 3-0  X588H

## (undated) DEVICE — R-BAND RADIAL HEMOSTASIS 24CM

## (undated) DEVICE — KIT ACIST MANIFOLD TRANSDR

## (undated) DEVICE — CLIP FOGARTY SPRING 6MM

## (undated) DEVICE — ***USE 56952*** SUTURE VICRYL 1 J371H CTX 36IN VIOLET

## (undated) DEVICE — PUNCH AORTIC 4.0MM

## (undated) DEVICE — TRAY URINE METER SILVER TEMP SENSING 14 FR LATEX

## (undated) DEVICE — SUTURE SILK 0 K834H

## (undated) DEVICE — CONTROLLER ACIST PREMIUM HAND

## (undated) DEVICE — TUBING INSUFFLATION HI-FLOW MEDC

## (undated) DEVICE — SUTURE VICRYL 4-0 J426H PS-2 27IN

## (undated) DEVICE — MYOWIRE ORANGE TEMP PACING WIRE 3/8 CIRCLE NEEDLE

## (undated) DEVICE — BLADE BEAVER 6900

## (undated) DEVICE — PENCIL ESU HANDSWITCHING W/HOL

## (undated) DEVICE — ***USE 121405***PACK VALVE

## (undated) DEVICE — SUTURE ETHIBOND 2-0  X833H

## (undated) DEVICE — DRESSING MEPILEX BORDER AG 4 X 12

## (undated) DEVICE — PLEDGETS SOFT 3/8IN   L-705

## (undated) DEVICE — TIP BOVIE BLADE COATED 6IN

## (undated) DEVICE — COVER CAMERA LIGHT HANDLE

## (undated) DEVICE — KIT CATH LAB ANGIO

## (undated) DEVICE — BONE HEMOSTASIS MATERIAL OSTENE

## (undated) DEVICE — SYRINGE DISP LUER-LOK 20 CC

## (undated) DEVICE — GUIDEWIRE DIAGNOSTIC 035-260 EXCHANGE

## (undated) DEVICE — SUTURE PROLENE 4-0   8521H

## (undated) DEVICE — CLIP LIGATING LRG 6PK HORIZON ORANGE

## (undated) DEVICE — CABLE EXTENSION SCREW DOWN 6FT ATRIAL/VENTRICULAR

## (undated) DEVICE — NEEDLE DISP HYPO 25GX1-1/2IN

## (undated) DEVICE — ***USE 56955*** SUTURE VICRYL 2-0  J339H CT-1

## (undated) DEVICE — INSERT FOGARTY STEALTH SZ3 61MM

## (undated) DEVICE — BLADE STERNAL SAW

## (undated) DEVICE — SUTURE DEKLENE MAX 7-0 XT-6 2N 24IN X2

## (undated) DEVICE — CLIP LIGATING SM 24PK HORIZON

## (undated) DEVICE — GLIDESHEATH SLENDER SS (.021) 6FR 10CM